# Patient Record
Sex: FEMALE | Race: WHITE | NOT HISPANIC OR LATINO | Employment: UNEMPLOYED | URBAN - METROPOLITAN AREA
[De-identification: names, ages, dates, MRNs, and addresses within clinical notes are randomized per-mention and may not be internally consistent; named-entity substitution may affect disease eponyms.]

---

## 2019-06-12 ENCOUNTER — OFFICE VISIT (OUTPATIENT)
Dept: ORTHOPEDIC SURGERY | Facility: CLINIC | Age: 49
End: 2019-06-12

## 2019-06-12 VITALS — HEIGHT: 65 IN | BODY MASS INDEX: 23.76 KG/M2 | WEIGHT: 142.6 LBS | TEMPERATURE: 99 F

## 2019-06-12 DIAGNOSIS — M70.61 TROCHANTERIC BURSITIS OF RIGHT HIP: ICD-10-CM

## 2019-06-12 DIAGNOSIS — M25.551 ACUTE PAIN OF RIGHT HIP: Primary | ICD-10-CM

## 2019-06-12 PROCEDURE — 99243 OFF/OP CNSLTJ NEW/EST LOW 30: CPT | Performed by: ORTHOPAEDIC SURGERY

## 2019-06-12 PROCEDURE — 73502 X-RAY EXAM HIP UNI 2-3 VIEWS: CPT | Performed by: ORTHOPAEDIC SURGERY

## 2019-06-12 PROCEDURE — 20610 DRAIN/INJ JOINT/BURSA W/O US: CPT | Performed by: ORTHOPAEDIC SURGERY

## 2019-06-12 RX ORDER — PANTOPRAZOLE SODIUM 40 MG/1
40 TABLET, DELAYED RELEASE ORAL DAILY
COMMUNITY
Start: 2018-11-20 | End: 2020-01-22

## 2019-06-12 RX ORDER — TIZANIDINE 4 MG/1
4 TABLET ORAL NIGHTLY PRN
COMMUNITY
End: 2022-11-26

## 2019-06-12 RX ORDER — ASPIRIN 81 MG/1
1 TABLET ORAL DAILY
COMMUNITY

## 2019-06-12 RX ORDER — ATORVASTATIN CALCIUM 80 MG/1
80 TABLET, FILM COATED ORAL DAILY
COMMUNITY
End: 2022-11-26

## 2019-06-12 RX ORDER — AMLODIPINE BESYLATE 5 MG/1
5 TABLET ORAL DAILY
COMMUNITY
End: 2022-11-26

## 2019-06-12 RX ORDER — CARVEDILOL 3.12 MG/1
3.12 TABLET ORAL
COMMUNITY
End: 2022-11-26

## 2019-06-12 RX ORDER — METHYLPREDNISOLONE ACETATE 80 MG/ML
80 INJECTION, SUSPENSION INTRA-ARTICULAR; INTRALESIONAL; INTRAMUSCULAR; SOFT TISSUE
Status: COMPLETED | OUTPATIENT
Start: 2019-06-12 | End: 2019-06-12

## 2019-06-12 RX ADMIN — METHYLPREDNISOLONE ACETATE 80 MG: 80 INJECTION, SUSPENSION INTRA-ARTICULAR; INTRALESIONAL; INTRAMUSCULAR; SOFT TISSUE at 15:37

## 2019-06-12 NOTE — PROGRESS NOTES
Patient Name: Zoya Crook   YOB: 1970  Referring Primary Care Physician: Jojo Coulter APRN  BMI: Body mass index is 23.73 kg/m².    Chief Complaint:    Chief Complaint   Patient presents with   • Right Hip - Establish Care, Pain        HPI:     Zoya Crook is a 48 y.o. female who presents today for evaluation of   Chief Complaint   Patient presents with   • Right Hip - Establish Care, Pain   .  Patient is 48-year-old lady who came over from [facility] when she was 17.  She has had some right hip pain going on for better than a year.  No trauma.  It hurts just superior to the right greater trochanter.  Anti-inflammatories helped a little.  She is currently not working.  She says she tried physical therapy in the past but it did not help and her problem is intermittent.    This problem is new to this examiner.     Subjective   Medications:   Home Medications:  Current Outpatient Medications on File Prior to Visit   Medication Sig   • amLODIPine (NORVASC) 5 MG tablet Take 5 mg by mouth Daily.   • ASPIRIN 81 PO Take 1 tablet by mouth Daily.   • atorvastatin (LIPITOR) 80 MG tablet Take 80 mg by mouth Daily.   • carvedilol (COREG) 3.125 MG tablet Take 3.125 mg by mouth.   • gabapentin (NEURONTIN) 100 MG capsule Take 100 mg by mouth 3 (three) times a day.   • pantoprazole (PROTONIX) 40 MG EC tablet Take 40 mg by mouth Daily.   • sertraline (ZOLOFT) 50 MG tablet Take 50 mg by mouth daily.   • ticagrelor (BRILINTA) 90 MG tablet tablet Take 90 mg by mouth.   • tiZANidine (ZANAFLEX) 4 MG tablet Take 4 mg by mouth At Night As Needed for Muscle Spasms.     No current facility-administered medications on file prior to visit.      Current Medications:  Scheduled Meds:  Continuous Infusions:  No current facility-administered medications for this visit.   PRN Meds:.    I have reviewed the patient's medical history in detail and updated the computerized patient record.  Review and summarization of old records  "includes:    Past Medical History:   Diagnosis Date   • Depression       No past surgical history on file.     Social History     Occupational History   • Not on file   Tobacco Use   • Smoking status: Heavy Tobacco Smoker     Packs/day: 1.00   Substance and Sexual Activity   • Alcohol use: No   • Drug use: No   • Sexual activity: Not on file    Social History     Social History Narrative   • Not on file      History reviewed. No pertinent family history.    ROS: 14 point review of systems was performed and all other systems were reviewed and are negative except for documented findings in HPI and today's encounter.     Allergies:   Allergies   Allergen Reactions   • Naproxen Other (See Comments)     GI UPSET/ HX ULCER     Constitutional:  Denies fever, shaking or chills   Eyes:  Denies change in visual acuity   HENT:  Denies nasal congestion or sore throat   Respiratory:  Denies cough or shortness of breath   Cardiovascular:  Denies chest pain or severe LE edema   GI:  Denies abdominal pain, nausea, vomiting, bloody stools or diarrhea   Musculoskeletal:  Numbness, tingling, pain, or loss of motor function only as noted above in history of present illness.  : Denies painful urination or hematuria  Integument:  Denies rash, lesion or ulceration   Neurologic:  Denies headache or focal weakness  Endocrine:  Denies lymphadenopathy  Psych:  Denies confusion or change in mental status   Hem:  Denies active bleeding    OBJECTIVE:  Physical Exam:   Temp 99 °F (37.2 °C) (Temporal)   Ht 165.1 cm (65\")   Wt 64.7 kg (142 lb 9.6 oz)   BMI 23.73 kg/m²     General Appearance:    Alert, cooperative, in no acute distress                  Eyes: conjunctiva clear  ENT: external ears and nose atraumatic  CV: no peripheral edema  Resp: normal respiratory effort  Skin: no rashes or wounds; normal turgor  Psych: mood and affect appropriate  Lymph: no nodes appreciated  Neuro: gross sensation intact  Vascular:  Palpable peripheral pulse " in noted extremity  Musculoskeletal Extremities: Exam shows tenderness at the tip of the greater trochanter Stinchfield is negative she has good rotation and no pain with axial loading bilaterally she does not really limp abduction causes some tenderness with combined palpation it does not radiate except for locally    Radiology:   AP of the hips lateral right hip taken the office today without comparison views available show moderate arthritic change    Assessment:     ICD-10-CM ICD-9-CM   1. Acute pain of right hip M25.551 719.45   2. Trochanteric bursitis of right hip M70.61 726.5        Large Joint Arthrocentesis: R greater trochanteric bursa  Date/Time: 6/12/2019 3:37 PM  Consent given by: patient  Site marked: site marked  Timeout: Immediately prior to procedure a time out was called to verify the correct patient, procedure, equipment, support staff and site/side marked as required   Supporting Documentation  Indications: pain and joint swelling   Procedure Details  Location: hip - R greater trochanteric bursa  Preparation: Patient was prepped and draped in the usual sterile fashion  Needle size: 22 G (21)  Approach: anterolateral  Medications administered: 4 mL lidocaine (cardiac); 80 mg methylPREDNISolone acetate 80 MG/ML  Patient tolerance: patient tolerated the procedure well with no immediate complications             Plan: Biomechanics of pertinent body area discussed.  Risks, benefits, alternatives, comparisons, and complications of accepted medicines, injections, recommendations, surgical procedures, and therapies explained and education provided in laymen's terms. Natural history and expected course of this patient's diagnosis discussed along with evaluation of therapies. Questions answered. When appropriate I also discussed proper use of cane, walker, trekking poles.   Cortisone Injection for DIAGNOSTIC and THERAPUTIC purposes.injected her with almost immediate numbness and will see if this helps  also gave her some stretches to do.  If she fails to get significantly better insight about a month because of her pain pattern I want her check an MRI and told her 3 time she could call to schedule that.  If it helps and wears off she desires another injection that can be repeated about 3 months.  Thank      6/12/2019    Much of this encounter note is an electronic transcription/translation of spoken language to printed text. The electronic translation of spoken language may permit erroneous, or at times, nonsensical words or phrases to be inadvertently transcribed; Although I have reviewed the note for such errors, some may still exist

## 2020-01-22 ENCOUNTER — OFFICE VISIT (OUTPATIENT)
Dept: GASTROENTEROLOGY | Facility: CLINIC | Age: 50
End: 2020-01-22

## 2020-01-22 VITALS
WEIGHT: 146.6 LBS | BODY MASS INDEX: 24.43 KG/M2 | HEIGHT: 65 IN | TEMPERATURE: 98.1 F | SYSTOLIC BLOOD PRESSURE: 120 MMHG | DIASTOLIC BLOOD PRESSURE: 70 MMHG

## 2020-01-22 DIAGNOSIS — R10.13 EPIGASTRIC PAIN: Primary | ICD-10-CM

## 2020-01-22 DIAGNOSIS — Z12.11 SCREENING FOR COLON CANCER: ICD-10-CM

## 2020-01-22 DIAGNOSIS — R13.19 OTHER DYSPHAGIA: ICD-10-CM

## 2020-01-22 DIAGNOSIS — Z11.59 SCREENING FOR VIRAL DISEASE: ICD-10-CM

## 2020-01-22 PROCEDURE — 99204 OFFICE O/P NEW MOD 45 MIN: CPT | Performed by: NURSE PRACTITIONER

## 2020-01-22 RX ORDER — CYCLOBENZAPRINE HCL 10 MG
TABLET ORAL AS NEEDED
COMMUNITY
Start: 2020-01-20 | End: 2022-11-26

## 2020-01-22 RX ORDER — FAMOTIDINE 40 MG/1
40 TABLET, FILM COATED ORAL 2 TIMES DAILY
Qty: 90 TABLET | Refills: 3 | Status: SHIPPED | OUTPATIENT
Start: 2020-01-22 | End: 2022-11-26

## 2020-01-22 NOTE — PROGRESS NOTES
Chief Complaint   Patient presents with   • Difficulty Swallowing   • Nausea     HPI    Zoya Crook is a  49 y.o. female here to establish care as a new patient with myself and Dr. Cruz.  This patient seen today for complaints of dysphagia and nausea.  She has a history of depression and MI.  Cardiac angioplasty with stent placement in 2018.     Reviewed CT abdomen and pelvis with IV contrast performed at [facility] for abdominal pain in March 2019 with no acute findings.    Patient is seen today accompanied by her daughter with a primary complaint of dysphagia and left upper quadrant abdominal pain.  There is mild associated nausea but no vomiting.  Her appetite is good.  Her weight is stable.  She has had these symptoms for approximately 5 or more years.  Worse over the last several months.  Patient has never seen a gastroenterologist for her issues.  She smokes 1 pack a day.  She denies alcohol use or NSAID use.  Dysphagia occurs about once a week.  Patient will feel food lodged mid chest and drink water for relief.  Denies regurgitation.  She reports being on PPI therapy as recently as 4 months ago but had to stop taking this medication due to the fact that her insurance would not pay for PPI therapy.    She still has her gallbladder.  She has a history of kidney stones.    She follows with cardiology and is currently on Plavix.    No diarrhea, constipation, rectal bleeding.  She has never had a screening colonoscopy.  She denies family history of colon cancer, colon polyps, ulcerative colitis, or Crohn's disease.    She goes on to report that her recent lab work may have shown anemia.  These labs are drawn in December.  I do not have a copy of this lab work.    Her daughter is concerned that she could have hepatitis C virus.  Her daughter has active hepatitis C and is trying to get treated with specialist at [facility].  The patient would like to be screened for hepatitis.    Past Medical History:    Diagnosis Date   • Depression    • Heart attack (CMS/HCC)        Past Surgical History:   Procedure Laterality Date   • CORONARY STENT PLACEMENT         Scheduled Meds:  Outpatient Encounter Medications as of 1/22/2020   Medication Sig Dispense Refill   • amLODIPine (NORVASC) 5 MG tablet Take 5 mg by mouth Daily.     • ASPIRIN 81 PO Take 1 tablet by mouth Daily.     • atorvastatin (LIPITOR) 80 MG tablet Take 80 mg by mouth Daily.     • carvedilol (COREG) 3.125 MG tablet Take 3.125 mg by mouth.     • cyclobenzaprine (FLEXERIL) 10 MG tablet As Needed.     • sertraline (ZOLOFT) 50 MG tablet Take 50 mg by mouth daily.     • [DISCONTINUED] pantoprazole (PROTONIX) 40 MG EC tablet Take 40 mg by mouth Daily.     • famotidine (PEPCID) 40 MG tablet Take 1 tablet by mouth 2 (Two) Times a Day. 90 tablet 3   • gabapentin (NEURONTIN) 100 MG capsule Take 100 mg by mouth 3 (three) times a day.     • ticagrelor (BRILINTA) 90 MG tablet tablet Take 90 mg by mouth.     • tiZANidine (ZANAFLEX) 4 MG tablet Take 4 mg by mouth At Night As Needed for Muscle Spasms.       No facility-administered encounter medications on file as of 1/22/2020.        Continuous Infusions:  No current facility-administered medications for this visit.     PRN Meds:.    Allergies   Allergen Reactions   • Naproxen Other (See Comments)     GI UPSET/ HX ULCER       Social History     Socioeconomic History   • Marital status:      Spouse name: Not on file   • Number of children: Not on file   • Years of education: Not on file   • Highest education level: Not on file   Tobacco Use   • Smoking status: Current Every Day Smoker     Packs/day: 1.00   • Smokeless tobacco: Never Used   Substance and Sexual Activity   • Alcohol use: No   • Drug use: No       History reviewed. No pertinent family history.    Review of Systems   Constitutional: Negative for activity change, appetite change, fatigue, fever and unexpected weight change.   HENT: Positive for trouble  swallowing. Negative for sore throat and voice change.    Eyes: Negative for discharge and itching.   Respiratory: Negative for apnea, cough, choking, chest tightness, shortness of breath and wheezing.    Cardiovascular: Negative for chest pain, palpitations and leg swelling.   Gastrointestinal: Positive for abdominal pain and nausea. Negative for abdominal distention, anal bleeding, blood in stool, constipation, diarrhea, rectal pain and vomiting.   Endocrine: Negative for cold intolerance and heat intolerance.   Genitourinary: Negative for difficulty urinating, dyspareunia and dysuria.   Musculoskeletal: Negative for arthralgias and neck pain.   Skin: Negative for color change and pallor.   Allergic/Immunologic: Negative for environmental allergies and food allergies.   Neurological: Negative for dizziness and headaches.   Hematological: Negative for adenopathy.   Psychiatric/Behavioral: Negative for agitation and confusion.       Vitals:    01/22/20 1435   BP: 120/70   Temp: 98.1 °F (36.7 °C)       Physical Exam   Constitutional: She is oriented to person, place, and time. She appears well-developed and well-nourished.   HENT:   Head: Normocephalic.   Eyes: Pupils are equal, round, and reactive to light.   Neck: Normal range of motion.   Cardiovascular: Normal rate, regular rhythm and normal heart sounds.   Pulmonary/Chest: Effort normal and breath sounds normal. No respiratory distress. She has no wheezes.   Abdominal: Soft. Bowel sounds are normal. She exhibits no distension and no mass. There is tenderness. There is no guarding. No hernia.   Musculoskeletal: Normal range of motion.   Neurological: She is alert and oriented to person, place, and time.   Skin: Skin is warm and dry. Capillary refill takes less than 2 seconds.   Psychiatric: She has a normal mood and affect. Her behavior is normal.     No radiology results for the last 7 days    Zoya was seen today for difficulty swallowing and nausea.    Diagnoses  and all orders for this visit:    Epigastric pain  -     CBC & Differential  -     Comprehensive Metabolic Panel  -     Iron and TIBC  -     Vitamin B12 and Folate  -     Celiac Comprehensive Panel  -     Lipase  -     Case Request; Standing  -     Case Request    Other dysphagia  -     CBC & Differential  -     Comprehensive Metabolic Panel  -     Iron and TIBC  -     Vitamin B12 and Folate  -     Celiac Comprehensive Panel  -     Case Request; Standing  -     Case Request    Screening for colon cancer  -     Case Request; Standing  -     Case Request    Screening for viral disease  -     Hepatitis Panel, Acute    Other orders  -     famotidine (PEPCID) 40 MG tablet; Take 1 tablet by mouth 2 (Two) Times a Day.    Assessment/plan    Pleasant 49-year-old female accompanied by her daughter seen today to establish care with myself and Dr. Cruz with a primary complaint of epigastric to left upper quadrant abdominal pain, nausea, and dysphagia.  Symptoms are longstanding as mentioned above.  She had improvement in symptoms with PPI therapy but insurance has denied coverage.    Moving forward recommend EGD with Dr. Cruz.  The likelihood of her having H. pylori is quite high.  We can also rule out esophagitis, stricture/ring/web, gastritis, celiac disease, peptic ulcer disease, Michelle's esophagus, or mass/malignancy.  In the meanwhile maximize H2 blocker as above.  I instructed the patient to avoid NSAIDs.  I also counseled her on smoking sensation.    Based on her age she is due for colon cancer screening as such we will arrange colonoscopy to be performed in combination with upper endoscopy with Dr. Cruz.    I discussed with the patient that I would prefer that anticoagulation be held for prior to the procedure, as there is otherwise increased risk of bleeding. However, I have indicated that I would defer the final decision to the patient’s prescribing provider (cardiology/internist) as to whether or not it is  safe to hold anticoagulation from a cardiac standpoint.    Labs today with CBC, CMP, check iron stores rule out vitamin deficiencies.  Check celiac panel.  Patient is also concerned that she could have hepatitis C virus.  Obtain hepatitis panel today.  If found to be positive treat accordingly.    Follow-up and further recommendations pending the aforementioned work-up.

## 2020-01-23 DIAGNOSIS — R10.13 EPIGASTRIC PAIN: Primary | ICD-10-CM

## 2020-01-23 DIAGNOSIS — R74.8 ELEVATED ALKALINE PHOSPHATASE LEVEL: ICD-10-CM

## 2020-01-23 LAB
ALBUMIN SERPL-MCNC: 4.3 G/DL (ref 3.5–5.2)
ALBUMIN/GLOB SERPL: 1.7 G/DL
ALP SERPL-CCNC: 177 U/L (ref 39–117)
ALT SERPL-CCNC: 34 U/L (ref 1–33)
AST SERPL-CCNC: 28 U/L (ref 1–32)
BASOPHILS # BLD AUTO: ABNORMAL 10*3/UL
BILIRUB SERPL-MCNC: 0.2 MG/DL (ref 0.2–1.2)
BUN SERPL-MCNC: 12 MG/DL (ref 6–20)
BUN/CREAT SERPL: 21.8 (ref 7–25)
CALCIUM SERPL-MCNC: 9.5 MG/DL (ref 8.6–10.5)
CHLORIDE SERPL-SCNC: 100 MMOL/L (ref 98–107)
CO2 SERPL-SCNC: 25.6 MMOL/L (ref 22–29)
CREAT SERPL-MCNC: 0.55 MG/DL (ref 0.57–1)
DIFFERENTIAL COMMENT: ABNORMAL
ENDOMYSIUM IGA SER QL: NEGATIVE
EOSINOPHIL # BLD AUTO: ABNORMAL 10*3/UL
EOSINOPHIL # BLD MANUAL: 0.29 10*3/MM3 (ref 0–0.4)
EOSINOPHIL NFR BLD AUTO: ABNORMAL %
EOSINOPHIL NFR BLD MANUAL: 3 % (ref 0.3–6.2)
ERYTHROCYTE [DISTWIDTH] IN BLOOD BY AUTOMATED COUNT: 12.6 % (ref 12.3–15.4)
FOLATE SERPL-MCNC: 15.9 NG/ML (ref 4.78–24.2)
GLIADIN PEPTIDE IGA SER-ACNC: 9 UNITS (ref 0–19)
GLIADIN PEPTIDE IGG SER-ACNC: 2 UNITS (ref 0–19)
GLOBULIN SER CALC-MCNC: 2.5 GM/DL
GLUCOSE SERPL-MCNC: 87 MG/DL (ref 65–99)
HAV IGM SERPL QL IA: NEGATIVE
HBV CORE IGM SERPL QL IA: NEGATIVE
HBV SURFACE AG SERPL QL IA: NEGATIVE
HCT VFR BLD AUTO: 45.3 % (ref 34–46.6)
HCV AB S/CO SERPL IA: <0.1 S/CO RATIO (ref 0–0.9)
HGB BLD-MCNC: 15 G/DL (ref 12–15.9)
IGA SERPL-MCNC: 288 MG/DL (ref 87–352)
IRON SATN MFR SERPL: 14 % (ref 20–50)
IRON SERPL-MCNC: 46 MCG/DL (ref 37–145)
LIPASE SERPL-CCNC: 49 U/L (ref 13–60)
LYMPHOCYTES # BLD AUTO: ABNORMAL 10*3/UL
LYMPHOCYTES # BLD MANUAL: 3.3 10*3/MM3 (ref 0.7–3.1)
LYMPHOCYTES NFR BLD AUTO: ABNORMAL %
LYMPHOCYTES NFR BLD MANUAL: 34 % (ref 19.6–45.3)
MCH RBC QN AUTO: 27.8 PG (ref 26.6–33)
MCHC RBC AUTO-ENTMCNC: 33.1 G/DL (ref 31.5–35.7)
MCV RBC AUTO: 83.9 FL (ref 79–97)
MONOCYTES # BLD MANUAL: 0.39 10*3/MM3 (ref 0.1–0.9)
MONOCYTES NFR BLD AUTO: ABNORMAL %
MONOCYTES NFR BLD MANUAL: 4 % (ref 5–12)
NEUTROPHILS # BLD MANUAL: 5.73 10*3/MM3 (ref 1.7–7)
NEUTROPHILS NFR BLD AUTO: ABNORMAL %
NEUTROPHILS NFR BLD MANUAL: 59 % (ref 42.7–76)
PLATELET # BLD AUTO: 124 10*3/MM3 (ref 140–450)
PLATELET BLD QL SMEAR: ABNORMAL
POTASSIUM SERPL-SCNC: 4.3 MMOL/L (ref 3.5–5.2)
PROT SERPL-MCNC: 6.8 G/DL (ref 6–8.5)
RBC # BLD AUTO: 5.4 10*6/MM3 (ref 3.77–5.28)
RBC MORPH BLD: ABNORMAL
SODIUM SERPL-SCNC: 140 MMOL/L (ref 136–145)
TIBC SERPL-MCNC: 337 MCG/DL
TTG IGA SER-ACNC: <2 U/ML (ref 0–3)
TTG IGG SER-ACNC: <2 U/ML (ref 0–5)
UIBC SERPL-MCNC: 291 MCG/DL (ref 112–346)
VIT B12 SERPL-MCNC: 988 PG/ML (ref 211–946)
WBC # BLD AUTO: 9.72 10*3/MM3 (ref 3.4–10.8)

## 2020-01-23 NOTE — PROGRESS NOTES
Please notify the patient that lab work shows no evidence of anemia. She does have slight elevation in her liver function.  Recommend she get a liver ultrasound make sure she does not have any gallstones.  Hepatitis panel is negative.

## 2020-01-29 ENCOUNTER — APPOINTMENT (OUTPATIENT)
Dept: ULTRASOUND IMAGING | Facility: HOSPITAL | Age: 50
End: 2020-01-29

## 2020-01-29 ENCOUNTER — HOSPITAL ENCOUNTER (OUTPATIENT)
Dept: ULTRASOUND IMAGING | Facility: HOSPITAL | Age: 50
Discharge: HOME | End: 2020-01-29
Admitting: NURSE PRACTITIONER

## 2020-01-29 ENCOUNTER — TELEPHONE (OUTPATIENT)
Dept: GASTROENTEROLOGY | Facility: CLINIC | Age: 50
End: 2020-01-29

## 2020-01-29 DIAGNOSIS — R10.13 EPIGASTRIC PAIN: ICD-10-CM

## 2020-01-29 DIAGNOSIS — R74.8 ELEVATED ALKALINE PHOSPHATASE LEVEL: ICD-10-CM

## 2020-01-29 PROCEDURE — 76705 ECHO EXAM OF ABDOMEN: CPT

## 2020-01-29 NOTE — TELEPHONE ENCOUNTER
Called Dr Hughes's office and advised calling for clearance for pt to hold Plavix.  advised they prefer that request be faxed [phone] with attention to Jessica.     Request faxed.

## 2020-01-29 NOTE — TELEPHONE ENCOUNTER
----- Message from ZAYNAB Cramer sent at 1/22/2020  3:12 PM EST -----  Patient will need cardiac clearance to hold Plavix for EGD and colonoscopy with Dr. Cruz.      Patients cardiologist Dr. Lerma (?) [phone]

## 2020-01-31 NOTE — PROGRESS NOTES
Please notify patient that ultrasound shows nonobstructing kidney stones.  No evidence of gallstones.  No gallbladder inflammation.  She does have a cyst on the right upper kidney.  She can follow-up with her primary care provider regarding renal findings.

## 2020-02-14 ENCOUNTER — TELEPHONE (OUTPATIENT)
Dept: GASTROENTEROLOGY | Facility: CLINIC | Age: 50
End: 2020-02-14

## 2020-02-14 NOTE — TELEPHONE ENCOUNTER
----- Message from ZAYNAB Cramer sent at 1/31/2020  1:02 PM EST -----  Please notify patient that ultrasound shows nonobstructing kidney stones.  No evidence of gallstones.  No gallbladder inflammation.  She does have a cyst on the right upper kidney.  She can follow-up with her primary care provider regarding renal fin  dings.

## 2020-02-14 NOTE — TELEPHONE ENCOUNTER
Called and spoke with pt and pt's daughter on speaker phone. Advised of the notes from Naomi. They verb understanding and asked if the plan is still to move forward with an EGD and C/S? Advised yes, it looks like we have reached out to pt's cardiologist for clearance to hold Plavix, but have not heard back yet. Verified pt sees Dr Hughes. Pt's daughter states pt has an appt with Dr Mercado next week on the 19th so she will mention needing clearance at the appt.

## 2020-02-14 NOTE — TELEPHONE ENCOUNTER
----- Message from ZAYNAB Cramer sent at 1/23/2020 10:03 AM EST -----  Please notify the patient that lab work shows no evidence of anemia. She does have slight elevation in her liver function.  Recommend she get a liver ultrasound make sure she does not have any gallstones.  Hepatitis panel is negative.

## 2020-02-19 ENCOUNTER — TELEPHONE (OUTPATIENT)
Dept: GASTROENTEROLOGY | Facility: CLINIC | Age: 50
End: 2020-02-19

## 2020-02-19 NOTE — TELEPHONE ENCOUNTER
----- Message from ZAYNAB Cramer sent at 2/19/2020 12:49 PM EST -----  Regarding: Cardiac clearance  Attached is the patient's cardiac clearance for upcoming procedures with Dr. Cruz.  ----- Message -----  From: Nilda Tim  Sent: 2/17/2020  12:50 PM EST  To: ZAYNAB Cramer

## 2020-02-21 NOTE — TELEPHONE ENCOUNTER
Per Dr. Cruz: Patient has been cleared for the scopic procedures please schedule, and let the patient know what date

## 2020-03-24 PROBLEM — R10.13 EPIGASTRIC PAIN: Status: ACTIVE | Noted: 2020-03-24

## 2020-03-24 PROBLEM — Z12.11 SCREENING FOR COLON CANCER: Status: ACTIVE | Noted: 2020-03-24

## 2020-03-24 PROBLEM — R13.19 OTHER DYSPHAGIA: Status: ACTIVE | Noted: 2020-03-24

## 2020-05-27 ENCOUNTER — TRANSCRIBE ORDERS (OUTPATIENT)
Dept: SLEEP MEDICINE | Facility: HOSPITAL | Age: 50
End: 2020-05-27

## 2020-05-27 DIAGNOSIS — Z01.818 OTHER SPECIFIED PRE-OPERATIVE EXAMINATION: Primary | ICD-10-CM

## 2020-05-29 ENCOUNTER — TRANSCRIBE ORDERS (OUTPATIENT)
Dept: SLEEP MEDICINE | Facility: HOSPITAL | Age: 50
End: 2020-05-29

## 2020-05-29 DIAGNOSIS — Z01.818 OTHER SPECIFIED PRE-OPERATIVE EXAMINATION: Primary | ICD-10-CM

## 2020-07-16 ENCOUNTER — TRANSCRIBE ORDERS (OUTPATIENT)
Dept: SLEEP MEDICINE | Facility: HOSPITAL | Age: 50
End: 2020-07-16

## 2020-07-16 DIAGNOSIS — Z01.818 OTHER SPECIFIED PRE-OPERATIVE EXAMINATION: Primary | ICD-10-CM

## 2020-07-18 ENCOUNTER — LAB (OUTPATIENT)
Dept: LAB | Facility: HOSPITAL | Age: 50
End: 2020-07-18

## 2020-07-18 DIAGNOSIS — Z01.818 OTHER SPECIFIED PRE-OPERATIVE EXAMINATION: ICD-10-CM

## 2020-07-18 PROCEDURE — C9803 HOPD COVID-19 SPEC COLLECT: HCPCS

## 2020-07-18 PROCEDURE — U0004 COV-19 TEST NON-CDC HGH THRU: HCPCS

## 2020-07-20 LAB
REF LAB TEST METHOD: NORMAL
SARS-COV-2 RNA RESP QL NAA+PROBE: NOT DETECTED

## 2022-03-25 ENCOUNTER — HOSPITAL ENCOUNTER (EMERGENCY)
Facility: HOSPITAL | Age: 52
Discharge: HOME | End: 2022-03-25
Attending: EMERGENCY MEDICINE | Admitting: EMERGENCY MEDICINE

## 2022-03-25 VITALS
RESPIRATION RATE: 18 BRPM | HEART RATE: 99 BPM | TEMPERATURE: 98.2 F | BODY MASS INDEX: 24.54 KG/M2 | WEIGHT: 147.27 LBS | DIASTOLIC BLOOD PRESSURE: 78 MMHG | OXYGEN SATURATION: 100 % | HEIGHT: 65 IN | SYSTOLIC BLOOD PRESSURE: 152 MMHG

## 2022-03-25 DIAGNOSIS — J06.9 VIRAL URI WITH COUGH: Primary | ICD-10-CM

## 2022-03-25 PROCEDURE — 99282 EMERGENCY DEPT VISIT SF MDM: CPT

## 2022-03-25 RX ORDER — BROMPHENIRAMINE MALEATE, PSEUDOEPHEDRINE HYDROCHLORIDE, AND DEXTROMETHORPHAN HYDROBROMIDE 2; 30; 10 MG/5ML; MG/5ML; MG/5ML
10 SYRUP ORAL 4 TIMES DAILY PRN
Qty: 240 ML | Refills: 0 | Status: SHIPPED | OUTPATIENT
Start: 2022-03-25 | End: 2022-11-26

## 2022-03-25 RX ORDER — FLUTICASONE PROPIONATE 50 MCG
2 SPRAY, SUSPENSION (ML) NASAL DAILY
Qty: 16 G | Refills: 0 | Status: SHIPPED | OUTPATIENT
Start: 2022-03-25 | End: 2022-11-26

## 2022-03-25 RX ORDER — CETIRIZINE HYDROCHLORIDE 5 MG/1
5 TABLET, CHEWABLE ORAL DAILY
Qty: 30 TABLET | Refills: 0 | Status: SHIPPED | OUTPATIENT
Start: 2022-03-25 | End: 2022-11-26

## 2022-03-25 NOTE — ED PROVIDER NOTES
Subjective   Pt to ED with complaint of cough, runny nose and sore throat for about 3 days. No fever, chills, N/V/D. Feels congested, has taken tylenol with no relief.      History provided by:  Parent   used: No        Review of Systems   Constitutional: Negative.    HENT: Positive for congestion, postnasal drip and rhinorrhea.    Eyes: Negative.    Respiratory: Positive for cough.    Cardiovascular: Negative.    Gastrointestinal: Negative.    Endocrine: Negative.    Genitourinary: Negative.    Musculoskeletal: Negative.    Skin: Negative.    Allergic/Immunologic: Negative.    Neurological: Negative.    Hematological: Negative.    Psychiatric/Behavioral: Negative.        Past Medical History:   Diagnosis Date   • Depression    • Heart attack (HCC)    • Hyperlipidemia    • Hypertension    • Kidney stone        Allergies   Allergen Reactions   • Acetaminophen Unknown - High Severity   • Naproxen Other (See Comments)     GI UPSET/ HX ULCER   • Ciprofloxacin Rash       Past Surgical History:   Procedure Laterality Date   • CORONARY STENT PLACEMENT         History reviewed. No pertinent family history.    Social History     Socioeconomic History   • Marital status:    Tobacco Use   • Smoking status: Current Every Day Smoker     Packs/day: 1.00   • Smokeless tobacco: Never Used   Substance and Sexual Activity   • Alcohol use: No   • Drug use: No           Objective   Physical Exam  Vitals reviewed.   Constitutional:       Appearance: Normal appearance. She is normal weight.   HENT:      Head: Normocephalic and atraumatic.      Right Ear: Tympanic membrane normal.      Left Ear: Tympanic membrane normal.      Nose: Nose normal.      Mouth/Throat:      Mouth: Mucous membranes are moist.      Pharynx: Posterior oropharyngeal erythema present.   Eyes:      Pupils: Pupils are equal, round, and reactive to light.   Cardiovascular:      Rate and Rhythm: Normal rate and regular rhythm.      Pulses:  Normal pulses.   Pulmonary:      Effort: Pulmonary effort is normal.      Breath sounds: Normal breath sounds and air entry.   Abdominal:      General: Abdomen is flat. Bowel sounds are normal.      Palpations: Abdomen is soft.   Musculoskeletal:         General: Normal range of motion.      Cervical back: Normal range of motion.   Skin:     General: Skin is warm and dry.      Capillary Refill: Capillary refill takes less than 2 seconds.   Neurological:      General: No focal deficit present.      Mental Status: She is alert and oriented to person, place, and time. Mental status is at baseline.   Psychiatric:         Mood and Affect: Mood normal.         Procedures           ED Course                                                 MDM  Number of Diagnoses or Management Options  Viral URI with cough: minor  Risk of Complications, Morbidity, and/or Mortality  Presenting problems: low  Management options: low    Patient Progress  Patient progress: stable      Final diagnoses:   Viral URI with cough       ED Disposition  ED Disposition     ED Disposition   Discharge    Condition   Stable    Comment   --             Provider, No Known  [facility] [id number]      if no better         Medication List      New Prescriptions    brompheniramine-pseudoephedrine-DM 30-2-10 MG/5ML syrup  Take 10 mL by mouth 4 (Four) Times a Day As Needed for Congestion or Cough.     cetirizine 5 MG chewable tablet  Commonly known as: ZyrTEC  Chew 1 tablet Daily for 30 days.     fluticasone 50 MCG/ACT nasal spray  Commonly known as: FLONASE  2 sprays into the nostril(s) as directed by provider Daily.           Where to Get Your Medications      [id number] - [place], [place] - [address] AT [facility] - [phone]  - [phone] FX  [address] [place] [address]    Phone: [phone]   · brompheniramine-pseudoephedrine-DM 30-2-10  MG/5ML syrup  · cetirizine 5 MG chewable tablet  · fluticasone 50 MCG/ACT nasal spray          Mariam Tapia, APRN  03/25/22 1510

## 2022-05-19 ENCOUNTER — APPOINTMENT (OUTPATIENT)
Dept: GENERAL RADIOLOGY | Facility: HOSPITAL | Age: 52
End: 2022-05-19

## 2022-05-19 LAB
FLUAV AG NPH QL: NEGATIVE
FLUBV AG NPH QL IA: NEGATIVE
S PYO AG THROAT QL: NEGATIVE

## 2022-05-19 PROCEDURE — 87081 CULTURE SCREEN ONLY: CPT | Performed by: EMERGENCY MEDICINE

## 2022-05-19 PROCEDURE — 87804 INFLUENZA ASSAY W/OPTIC: CPT

## 2022-05-19 PROCEDURE — 87880 STREP A ASSAY W/OPTIC: CPT

## 2022-05-19 PROCEDURE — U0004 COV-19 TEST NON-CDC HGH THRU: HCPCS

## 2022-05-19 PROCEDURE — 71045 X-RAY EXAM CHEST 1 VIEW: CPT

## 2022-05-19 PROCEDURE — 99283 EMERGENCY DEPT VISIT LOW MDM: CPT

## 2022-05-19 PROCEDURE — C9803 HOPD COVID-19 SPEC COLLECT: HCPCS | Performed by: EMERGENCY MEDICINE

## 2022-05-20 ENCOUNTER — HOSPITAL ENCOUNTER (EMERGENCY)
Facility: HOSPITAL | Age: 52
Discharge: HOME | End: 2022-05-20
Attending: EMERGENCY MEDICINE | Admitting: EMERGENCY MEDICINE

## 2022-05-20 VITALS
HEIGHT: 65 IN | OXYGEN SATURATION: 98 % | SYSTOLIC BLOOD PRESSURE: 132 MMHG | RESPIRATION RATE: 17 BRPM | TEMPERATURE: 99 F | BODY MASS INDEX: 23.58 KG/M2 | WEIGHT: 141.54 LBS | HEART RATE: 80 BPM | DIASTOLIC BLOOD PRESSURE: 62 MMHG

## 2022-05-20 DIAGNOSIS — J02.9 PHARYNGITIS, UNSPECIFIED ETIOLOGY: Primary | ICD-10-CM

## 2022-05-20 DIAGNOSIS — R11.2 NAUSEA AND VOMITING, UNSPECIFIED VOMITING TYPE: ICD-10-CM

## 2022-05-20 LAB — SARS-COV-2 RNA PNL SPEC NAA+PROBE: NOT DETECTED

## 2022-05-20 PROCEDURE — 63710000001 ONDANSETRON ODT 4 MG TABLET DISPERSIBLE: Performed by: NURSE PRACTITIONER

## 2022-05-20 PROCEDURE — 63710000001 PREDNISONE PER 5 MG: Performed by: NURSE PRACTITIONER

## 2022-05-20 RX ORDER — PREDNISONE 50 MG/1
50 TABLET ORAL DAILY
Qty: 4 TABLET | Refills: 0 | Status: SHIPPED | OUTPATIENT
Start: 2022-05-20 | End: 2022-11-26

## 2022-05-20 RX ORDER — OMEPRAZOLE 40 MG/1
40 CAPSULE, DELAYED RELEASE ORAL DAILY
COMMUNITY

## 2022-05-20 RX ORDER — ONDANSETRON 4 MG/1
4 TABLET, ORALLY DISINTEGRATING ORAL 4 TIMES DAILY PRN
Qty: 15 TABLET | Refills: 0 | Status: SHIPPED | OUTPATIENT
Start: 2022-05-20 | End: 2022-11-26

## 2022-05-20 RX ORDER — PREDNISONE 50 MG/1
50 TABLET ORAL ONCE
Status: COMPLETED | OUTPATIENT
Start: 2022-05-20 | End: 2022-05-20

## 2022-05-20 RX ORDER — ONDANSETRON 4 MG/1
4 TABLET, ORALLY DISINTEGRATING ORAL ONCE
Status: COMPLETED | OUTPATIENT
Start: 2022-05-20 | End: 2022-05-20

## 2022-05-20 RX ORDER — CLOPIDOGREL BISULFATE 75 MG/1
75 TABLET ORAL DAILY
COMMUNITY

## 2022-05-20 RX ADMIN — PREDNISONE 50 MG: 50 TABLET ORAL at 04:00

## 2022-05-20 RX ADMIN — ONDANSETRON 4 MG: 4 TABLET, ORALLY DISINTEGRATING ORAL at 04:00

## 2022-05-20 NOTE — ED PROVIDER NOTES
Subjective   The patient presents to the emergency department complains of a sore throat that she states she woke up with yesterday.  She states that she has not had any other upper respiratory symptoms such as cough or congestion.  She denies any fevers.  She states she did have 2 episodes of nausea and vomiting yesterday but no diarrhea.  She denies any abdominal pain or tenderness.  She reports no chest pain or shortness of breath.  She states she is able to swallow without difficulties.  On exam her breath sounds are clear.  Her airway is patent.  There is no exudate noted.  Her abdomen is soft and nontender with palpation.          Review of Systems   Constitutional: Negative for chills and fever.   HENT: Positive for sore throat. Negative for congestion, ear pain, facial swelling, trouble swallowing and voice change.    Eyes: Negative for pain.   Respiratory: Negative for cough, chest tightness and shortness of breath.    Cardiovascular: Negative for chest pain.   Gastrointestinal: Positive for nausea and vomiting. Negative for abdominal pain and diarrhea.   Genitourinary: Negative for dysuria, flank pain, hematuria and urgency.   Musculoskeletal: Negative for back pain, joint swelling, neck pain and neck stiffness.   Skin: Negative for pallor and rash.   Neurological: Negative for seizures and headaches.   All other systems reviewed and are negative.      Past Medical History:   Diagnosis Date   • Depression    • Heart attack (HCC)    • Hyperlipidemia    • Hypertension    • Kidney stone        Allergies   Allergen Reactions   • Acetaminophen Unknown - High Severity   • Naproxen Other (See Comments)     GI UPSET/ HX ULCER   • Ciprofloxacin Rash       Past Surgical History:   Procedure Laterality Date   • CORONARY STENT PLACEMENT         History reviewed. No pertinent family history.    Social History     Socioeconomic History   • Marital status:    Tobacco Use   • Smoking status: Current Every Day  Smoker     Packs/day: 1.00   • Smokeless tobacco: Never Used   Substance and Sexual Activity   • Alcohol use: No   • Drug use: No           Objective   Physical Exam  Vitals and nursing note reviewed.   Constitutional:       General: She is not in acute distress.     Appearance: Normal appearance. She is not toxic-appearing.   HENT:      Head: Normocephalic and atraumatic.      Mouth/Throat:      Mouth: Mucous membranes are moist.      Pharynx: Oropharynx is clear. Posterior oropharyngeal erythema present. No oropharyngeal exudate.   Eyes:      General: No scleral icterus.  Cardiovascular:      Rate and Rhythm: Normal rate and regular rhythm.      Pulses: Normal pulses.   Pulmonary:      Effort: Pulmonary effort is normal. No respiratory distress.      Breath sounds: Normal breath sounds.   Abdominal:      General: Abdomen is flat.      Palpations: Abdomen is soft.      Tenderness: There is no abdominal tenderness. There is no guarding or rebound.   Musculoskeletal:         General: Normal range of motion.      Cervical back: Normal range of motion and neck supple. No rigidity or tenderness.   Lymphadenopathy:      Cervical: No cervical adenopathy.   Skin:     General: Skin is warm and dry.      Capillary Refill: Capillary refill takes less than 2 seconds.      Findings: No rash.   Neurological:      General: No focal deficit present.      Mental Status: She is alert and oriented to person, place, and time. Mental status is at baseline.   Psychiatric:         Mood and Affect: Mood normal.         Behavior: Behavior normal.         Procedures           ED Course                                                 MDM  Number of Diagnoses or Management Options  Nausea and vomiting, unspecified vomiting type: minor  Pharyngitis, unspecified etiology: minor     Amount and/or Complexity of Data Reviewed  Clinical lab tests: reviewed  Tests in the radiology section of CPT®: reviewed    Risk of Complications, Morbidity, and/or  Mortality  Presenting problems: minimal  Diagnostic procedures: minimal  Management options: minimal    Patient Progress  Patient progress: stable      Final diagnoses:   Pharyngitis, unspecified etiology   Nausea and vomiting, unspecified vomiting type       ED Disposition  ED Disposition     ED Disposition   Discharge    Condition   Stable    Comment   --             Magaly Stanley, APRN  [address] [facility] [address] [phone]    Call today  FOR FOLLOW UP ON MONDAY OR TUESDAY         Medication List      New Prescriptions    ondansetron ODT 4 MG disintegrating tablet  Commonly known as: ZOFRAN-ODT  Place 1 tablet on the tongue 4 (Four) Times a Day As Needed for Nausea or Vomiting.     predniSONE 50 MG tablet  Commonly known as: DELTASONE  Take 1 tablet by mouth Daily.           Where to Get Your Medications      These medications were sent to [id number] - [place] - [address] AT [facility] - [phone]  - [phone]   [address] [place] [address]    Hours: 24-hours Phone: [phone]   · ondansetron ODT 4 MG disintegrating tablet  · predniSONE 50 MG tablet          Margaux Masterson APRN  05/20/22 0863

## 2022-05-22 LAB — BACTERIA SPEC AEROBE CULT: NORMAL

## 2022-09-06 ENCOUNTER — HOSPITAL ENCOUNTER (EMERGENCY)
Facility: HOSPITAL | Age: 52
Discharge: HOME | End: 2022-09-06
Attending: EMERGENCY MEDICINE | Admitting: EMERGENCY MEDICINE

## 2022-09-06 ENCOUNTER — APPOINTMENT (OUTPATIENT)
Dept: GENERAL RADIOLOGY | Facility: HOSPITAL | Age: 52
End: 2022-09-06

## 2022-09-06 VITALS
BODY MASS INDEX: 22.88 KG/M2 | HEIGHT: 65 IN | WEIGHT: 137.35 LBS | OXYGEN SATURATION: 99 % | SYSTOLIC BLOOD PRESSURE: 146 MMHG | DIASTOLIC BLOOD PRESSURE: 78 MMHG | RESPIRATION RATE: 17 BRPM | HEART RATE: 78 BPM | TEMPERATURE: 97.8 F

## 2022-09-06 DIAGNOSIS — R07.9 CHEST PAIN IN ADULT: Primary | ICD-10-CM

## 2022-09-06 LAB
ALBUMIN SERPL-MCNC: 4.2 G/DL (ref 3.5–5.2)
ALBUMIN/GLOB SERPL: 1.2 G/DL
ALP SERPL-CCNC: 120 U/L (ref 39–117)
ALT SERPL W P-5'-P-CCNC: 18 U/L (ref 1–33)
ANION GAP SERPL CALCULATED.3IONS-SCNC: 11.6 MMOL/L (ref 5–15)
AST SERPL-CCNC: 19 U/L (ref 1–32)
BASOPHILS # BLD AUTO: 0.08 10*3/MM3 (ref 0–0.2)
BASOPHILS NFR BLD AUTO: 0.8 % (ref 0–1.5)
BILIRUB SERPL-MCNC: 0.3 MG/DL (ref 0–1.2)
BUN SERPL-MCNC: 12 MG/DL (ref 6–20)
BUN/CREAT SERPL: 22.6 (ref 7–25)
CALCIUM SPEC-SCNC: 9.1 MG/DL (ref 8.6–10.5)
CHLORIDE SERPL-SCNC: 103 MMOL/L (ref 98–107)
CO2 SERPL-SCNC: 24.4 MMOL/L (ref 22–29)
CREAT SERPL-MCNC: 0.53 MG/DL (ref 0.57–1)
DEPRECATED RDW RBC AUTO: 42 FL (ref 37–54)
EGFRCR SERPLBLD CKD-EPI 2021: 110.1 ML/MIN/1.73
EOSINOPHIL # BLD AUTO: 0.19 10*3/MM3 (ref 0–0.4)
EOSINOPHIL NFR BLD AUTO: 1.8 % (ref 0.3–6.2)
ERYTHROCYTE [DISTWIDTH] IN BLOOD BY AUTOMATED COUNT: 13.8 % (ref 12.3–15.4)
GLOBULIN UR ELPH-MCNC: 3.5 GM/DL
GLUCOSE SERPL-MCNC: 119 MG/DL (ref 65–99)
HCT VFR BLD AUTO: 41.8 % (ref 34–46.6)
HGB BLD-MCNC: 14 G/DL (ref 12–15.9)
HOLD SPECIMEN: 11
HOLD SPECIMEN: 11
HOLD SPECIMEN: NORMAL
HOLD SPECIMEN: NORMAL
IMM GRANULOCYTES # BLD AUTO: 0.02 10*3/MM3 (ref 0–0.05)
IMM GRANULOCYTES NFR BLD AUTO: 0.2 % (ref 0–0.5)
LIPASE SERPL-CCNC: 36 U/L (ref 13–60)
LYMPHOCYTES # BLD AUTO: 2.59 10*3/MM3 (ref 0.7–3.1)
LYMPHOCYTES NFR BLD AUTO: 24.9 % (ref 19.6–45.3)
MAGNESIUM SERPL-MCNC: 1.8 MG/DL (ref 1.6–2.6)
MCH RBC QN AUTO: 28 PG (ref 26.6–33)
MCHC RBC AUTO-ENTMCNC: 33.5 G/DL (ref 31.5–35.7)
MCV RBC AUTO: 83.6 FL (ref 79–97)
MONOCYTES # BLD AUTO: 0.65 10*3/MM3 (ref 0.1–0.9)
MONOCYTES NFR BLD AUTO: 6.3 % (ref 5–12)
NEUTROPHILS NFR BLD AUTO: 6.87 10*3/MM3 (ref 1.7–7)
NEUTROPHILS NFR BLD AUTO: 66 % (ref 42.7–76)
NRBC BLD AUTO-RTO: 0 /100 WBC (ref 0–0.2)
NT-PROBNP SERPL-MCNC: 107.4 PG/ML (ref 0–900)
PLATELET # BLD AUTO: 141 10*3/MM3 (ref 140–450)
PMV BLD AUTO: 13.2 FL (ref 6–12)
POTASSIUM SERPL-SCNC: 3.6 MMOL/L (ref 3.5–5.2)
PROT SERPL-MCNC: 7.7 G/DL (ref 6–8.5)
QT INTERVAL: 366 MS
QT INTERVAL: 407 MS
RBC # BLD AUTO: 5 10*6/MM3 (ref 3.77–5.28)
SODIUM SERPL-SCNC: 139 MMOL/L (ref 136–145)
TROPONIN I SERPL-MCNC: 0 NG/ML (ref 0–0.08)
TROPONIN I SERPL-MCNC: 0 NG/ML (ref 0–0.08)
WBC NRBC COR # BLD: 10.4 10*3/MM3 (ref 3.4–10.8)
WHOLE BLOOD HOLD COAG: 11
WHOLE BLOOD HOLD SPECIMEN: 11

## 2022-09-06 PROCEDURE — 84484 ASSAY OF TROPONIN QUANT: CPT

## 2022-09-06 PROCEDURE — 93005 ELECTROCARDIOGRAM TRACING: CPT | Performed by: EMERGENCY MEDICINE

## 2022-09-06 PROCEDURE — 85025 COMPLETE CBC W/AUTO DIFF WBC: CPT | Performed by: EMERGENCY MEDICINE

## 2022-09-06 PROCEDURE — 36415 COLL VENOUS BLD VENIPUNCTURE: CPT

## 2022-09-06 PROCEDURE — 83735 ASSAY OF MAGNESIUM: CPT | Performed by: EMERGENCY MEDICINE

## 2022-09-06 PROCEDURE — 93005 ELECTROCARDIOGRAM TRACING: CPT

## 2022-09-06 PROCEDURE — 80053 COMPREHEN METABOLIC PANEL: CPT | Performed by: EMERGENCY MEDICINE

## 2022-09-06 PROCEDURE — 83690 ASSAY OF LIPASE: CPT | Performed by: EMERGENCY MEDICINE

## 2022-09-06 PROCEDURE — 99284 EMERGENCY DEPT VISIT MOD MDM: CPT

## 2022-09-06 PROCEDURE — 71045 X-RAY EXAM CHEST 1 VIEW: CPT

## 2022-09-06 PROCEDURE — 83880 ASSAY OF NATRIURETIC PEPTIDE: CPT | Performed by: EMERGENCY MEDICINE

## 2022-09-06 RX ORDER — SODIUM CHLORIDE 0.9 % (FLUSH) 0.9 %
10 SYRINGE (ML) INJECTION AS NEEDED
Status: DISCONTINUED | OUTPATIENT
Start: 2022-09-06 | End: 2022-09-06 | Stop reason: HOSPADM

## 2022-09-06 RX ORDER — ASPIRIN 81 MG/1
324 TABLET, CHEWABLE ORAL ONCE
Status: COMPLETED | OUTPATIENT
Start: 2022-09-06 | End: 2022-09-06

## 2022-09-06 RX ADMIN — ASPIRIN 81 MG CHEWABLE TABLET 324 MG: 81 TABLET CHEWABLE at 15:05

## 2022-09-06 NOTE — ED PROVIDER NOTES
Time: 1:22 PM EDT  Arrived by: EMS  Chief Complaint: chest pain and anxiety  History provided by: patient  History is limited by: N/A    History of Present Illness:  Patient is a 52 y.o. year old female who presents to the emergency department with chest pain and anxiety.    Patient arrives to ED by EMS. Patient has a medical history of myocardial infarction with stent placement in (2018) and anxiety. Patient is a current, daily smoker.     Patient reports increased symptoms of anxiety and chest pain after law-enforcement came for her son today (09/06/2022). Patient also reported crying during and after the incident. Patient reports on initial evaluation, pain has subsided at about 11:30am. Patient denies fever, chills, SOB, diaphoresis.       History provided by:  Patient   used: No        Patient Care Team  Primary Care Provider: Magaly Stanley APRN    Past Medical History:     Allergies   Allergen Reactions   • Acetaminophen Unknown - High Severity   • Naproxen Other (See Comments)     GI UPSET/ HX ULCER   • Ciprofloxacin Rash     Past Medical History:   Diagnosis Date   • Depression    • Heart attack (HCC)    • Hyperlipidemia    • Hypertension    • Kidney stone      Past Surgical History:   Procedure Laterality Date   • CORONARY STENT PLACEMENT       History reviewed. No pertinent family history.    Home Medications:  Prior to Admission medications    Medication Sig Start Date End Date Taking? Authorizing Provider   amLODIPine (NORVASC) 5 MG tablet Take 5 mg by mouth Daily.    ProviderLuis MD   ASPIRIN 81 PO Take 1 tablet by mouth Daily.    Provider, MD Luis   atorvastatin (LIPITOR) 80 MG tablet Take 80 mg by mouth Daily.    Provider, MD Luis   brompheniramine-pseudoephedrine-DM 30-2-10 MG/5ML syrup Take 10 mL by mouth 4 (Four) Times a Day As Needed for Congestion or Cough. 3/25/22   Mariam Tapia APRN   carvedilol (COREG) 3.125 MG tablet Take 3.125 mg by  mouth.    Luis Mahoney MD   cetirizine (ZyrTEC) 5 MG chewable tablet Chew 1 tablet Daily for 30 days. 3/25/22 4/24/22  Mariam Tapia APRN   clopidogrel (PLAVIX) 75 MG tablet Take 75 mg by mouth Daily.    Luis Mahoney MD   cyclobenzaprine (FLEXERIL) 10 MG tablet As Needed. 1/20/20   Luis Mahoney MD   famotidine (PEPCID) 40 MG tablet Take 1 tablet by mouth 2 (Two) Times a Day. 1/22/20   Naomi Henry APRN   fluticasone (FLONASE) 50 MCG/ACT nasal spray 2 sprays into the nostril(s) as directed by provider Daily. 3/25/22   Mariam Tapia APRN   gabapentin (NEURONTIN) 100 MG capsule Take 100 mg by mouth 3 (three) times a day.    Luis Mahoney MD   omeprazole (priLOSEC) 40 MG capsule Take 40 mg by mouth Daily.    Luis Mahoney MD   ondansetron ODT (ZOFRAN-ODT) 4 MG disintegrating tablet Place 1 tablet on the tongue 4 (Four) Times a Day As Needed for Nausea or Vomiting. 5/20/22   Margaux Masterson APRN   predniSONE (DELTASONE) 50 MG tablet Take 1 tablet by mouth Daily. 5/20/22   Margaux Masterson APRN   sertraline (ZOLOFT) 50 MG tablet Take 50 mg by mouth daily.    Luis Mahoney MD   ticagrelor (BRILINTA) 90 MG tablet tablet Take 90 mg by mouth.    Luis Mahoney MD   tiZANidine (ZANAFLEX) 4 MG tablet Take 4 mg by mouth At Night As Needed for Muscle Spasms.    Luis Mahoney MD        Social History:   Social History     Tobacco Use   • Smoking status: Current Every Day Smoker     Packs/day: 1.00   • Smokeless tobacco: Never Used   Substance Use Topics   • Alcohol use: No   • Drug use: No     Recent travel: not applicable    Review of Systems:  Review of Systems   Constitutional: Negative for chills and fever.   HENT: Negative for congestion, ear pain and sore throat.    Eyes: Negative for pain.   Respiratory: Negative for cough, chest tightness and shortness of breath.    Cardiovascular: Positive for chest pain.   Gastrointestinal: Negative for  "abdominal pain, diarrhea, nausea and vomiting.   Genitourinary: Negative for flank pain and hematuria.   Musculoskeletal: Negative for joint swelling.   Skin: Negative for pallor.   Neurological: Negative for seizures and headaches.   Psychiatric/Behavioral: The patient is nervous/anxious.    All other systems reviewed and are negative.       Physical Exam:  /82 (Patient Position: Lying)   Pulse 73   Temp 98.1 °F (36.7 °C) (Oral)   Resp 20   Ht 165.1 cm (65\")   Wt 62.3 kg (137 lb 5.6 oz)   SpO2 99%   BMI 22.86 kg/m²     Physical Exam  Vitals and nursing note reviewed.   Constitutional:       General: She is not in acute distress.     Appearance: Normal appearance. She is not toxic-appearing.   HENT:      Head: Normocephalic and atraumatic.      Mouth/Throat:      Mouth: Mucous membranes are moist.   Eyes:      General: No scleral icterus.  Cardiovascular:      Rate and Rhythm: Normal rate and regular rhythm.      Pulses: Normal pulses.      Heart sounds: Normal heart sounds.   Pulmonary:      Effort: Pulmonary effort is normal. No respiratory distress.      Breath sounds: Normal breath sounds.   Abdominal:      General: Abdomen is flat.      Palpations: Abdomen is soft.      Tenderness: There is no abdominal tenderness.   Musculoskeletal:         General: Normal range of motion.      Cervical back: Normal range of motion and neck supple.   Skin:     General: Skin is warm and dry.   Neurological:      Mental Status: She is alert and oriented to person, place, and time. Mental status is at baseline.                Medications in the Emergency Department:  Medications   sodium chloride 0.9 % flush 10 mL (has no administration in time range)   aspirin chewable tablet 324 mg (324 mg Oral Given by Other 9/6/22 1505)        Labs  Lab Results (last 24 hours)     Procedure Component Value Units Date/Time    CBC & Differential [[id number]]  (Abnormal) Collected: 09/06/22 1323    Specimen: Blood Updated: 09/06/22 " 1418    Narrative:      The following orders were created for panel order CBC & Differential.  Procedure                               Abnormality         Status                     ---------                               -----------         ------                     CBC Auto Differential[[id number]]        Abnormal            Final result               Scan Slide[[id number]]                                                                    Please view results for these tests on the individual orders.    Comprehensive Metabolic Panel [[id number]]  (Abnormal) Collected: 09/06/22 1323    Specimen: Blood Updated: 09/06/22 1404     Glucose 119 mg/dL      BUN 12 mg/dL      Creatinine 0.53 mg/dL      Sodium 139 mmol/L      Potassium 3.6 mmol/L      Chloride 103 mmol/L      CO2 24.4 mmol/L      Calcium 9.1 mg/dL      Total Protein 7.7 g/dL      Albumin 4.20 g/dL      ALT (SGPT) 18 U/L      AST (SGOT) 19 U/L      Alkaline Phosphatase 120 U/L      Total Bilirubin 0.3 mg/dL      Globulin 3.5 gm/dL      A/G Ratio 1.2 g/dL      BUN/Creatinine Ratio 22.6     Anion Gap 11.6 mmol/L      eGFR 110.1 mL/min/1.73      Comment: National Kidney Foundation and American Society of Nephrology (ASN) Task Force recommended calculation based on the Chronic Kidney Disease Epidemiology Collaboration (CKD-EPI) equation refit without adjustment for race.       Narrative:      GFR Normal >60  Chronic Kidney Disease <60  Kidney Failure <15      Lipase [[id number]]  (Normal) Collected: 09/06/22 1323    Specimen: Blood Updated: 09/06/22 1404     Lipase 36 U/L     BNP [[id number]]  (Normal) Collected: 09/06/22 1323    Specimen: Blood Updated: 09/06/22 1401     proBNP 107.4 pg/mL     Narrative:      Among patients with dyspnea, NT-proBNP is highly sensitive for the detection of acute congestive heart failure. In addition NT-proBNP of <300 pg/ml effectively rules out acute congestive heart failure with 99% negative predictive value.    Results may be  falsely decreased if patient taking Biotin.      Magnesium [[id number]]  (Normal) Collected: 09/06/22 1323    Specimen: Blood Updated: 09/06/22 1404     Magnesium 1.8 mg/dL     CBC Auto Differential [[id number]]  (Abnormal) Collected: 09/06/22 1323    Specimen: Blood Updated: 09/06/22 1418     WBC 10.40 10*3/mm3      RBC 5.00 10*6/mm3      Hemoglobin 14.0 g/dL      Hematocrit 41.8 %      MCV 83.6 fL      MCH 28.0 pg      MCHC 33.5 g/dL      RDW 13.8 %      RDW-SD 42.0 fl      MPV 13.2 fL      Platelets 141 10*3/mm3      Neutrophil % 66.0 %      Lymphocyte % 24.9 %      Monocyte % 6.3 %      Eosinophil % 1.8 %      Basophil % 0.8 %      Immature Grans % 0.2 %      Neutrophils, Absolute 6.87 10*3/mm3      Lymphocytes, Absolute 2.59 10*3/mm3      Monocytes, Absolute 0.65 10*3/mm3      Eosinophils, Absolute 0.19 10*3/mm3      Basophils, Absolute 0.08 10*3/mm3      Immature Grans, Absolute 0.02 10*3/mm3      nRBC 0.0 /100 WBC     POC Troponin I [[id number]]  (Normal) Collected: 09/06/22 1323    Specimen: Blood Updated: 09/06/22 1336     Troponin I 0.00 ng/mL      Comment: Serial Number: [id number]:  [id number]       POC Troponin I [[id number]]  (Normal) Collected: 09/06/22 1530    Specimen: Blood Updated: 09/06/22 1543     Troponin I 0.00 ng/mL      Comment: Serial Number: [id number]:  [id number]              Imaging:  XR Chest 1 View    Result Date: 9/6/2022  PROCEDURE: XR CHEST 1 VW  COMPARISON: [facility], CR, XR CHEST 1 VW, 5/19/2022, 21:30.  INDICATIONS: Chest Pain Triage Protocol  FINDINGS: There is subtle increased density in the lower lobes bilaterally suggesting developing infiltrate. No significant pleural effusions are seen. The cardiac silhouette is within normal limits for size. The mediastinal soft tissues are stable. No acute osseous abnormalities are observed.       Evidence for developing bibasilar pneumonia versus pulmonary edema. Recommend correlation for signs or symptoms of acute infection  and followup to resolution.           CLARY VEGA MD       Electronically Signed and Approved By: CLARY VEGA MD on 9/06/2022 at 13:40               Procedures:  Procedures    Progress  ED Course as of 09/06/22 1614   Tue Sep 06, 2022   1331 EKG: Rate 92, left atrial enlargement, normal QRS, nonspecific ST segment changes, normal QT interval, no previous for comparison [RW]      ED Course User Index  [RW] Lavell Healy MD           HEART Score: 2                  Medical Decision Making:  MDM  Number of Diagnoses or Management Options  Chest pain in adult  Diagnosis management comments: Patient presents complaining of chest pain.  Old records reviewed in epic.  Differential diagnostic considerations include but not limited to ACS versus PE.  PE is low making PE unlikely.  Heart score is low making ACS unlikely.  Cardiac markers are also normal making ACS unlikely.  Chest radiograph does not demonstrate infiltrates as a source of the patient's pain.  She did have emotional stressors earlier in the day and agree this may been the source of her pain is this is caused pain in the past reportedly.  She is pain-free on final assessment and appropriate for outpatient follow-up.  She is to return for worsening pain or other concerns.       Amount and/or Complexity of Data Reviewed  Clinical lab tests: reviewed  Tests in the medicine section of CPT®: reviewed  Decide to obtain previous medical records or to obtain history from someone other than the patient: yes    Risk of Complications, Morbidity, and/or Mortality  Presenting problems: high  Management options: low    Patient Progress  Patient progress: stable       Final diagnoses:   Chest pain in adult        Disposition:  ED Disposition     ED Disposition   Discharge    Condition   Stable    Comment   --             This medical record created using voice recognition software and a virtual scribe.    Documentation assistance provided by Ruth Colindres acting as  scribe for Dr. Lavell Healy MD. Information recorded by the scribe was done at my direction and has been verified and validated by me.       Ruth Colindres  09/06/22 1336       Lavell Healy MD  09/06/22 4043

## 2022-11-26 ENCOUNTER — APPOINTMENT (OUTPATIENT)
Dept: GENERAL RADIOLOGY | Facility: HOSPITAL | Age: 52
End: 2022-11-26

## 2022-11-26 ENCOUNTER — APPOINTMENT (OUTPATIENT)
Dept: CARDIOLOGY | Facility: HOSPITAL | Age: 52
End: 2022-11-26

## 2022-11-26 ENCOUNTER — HOSPITAL ENCOUNTER (OUTPATIENT)
Facility: HOSPITAL | Age: 52
Setting detail: OBSERVATION
Discharge: HOME | End: 2022-11-27
Attending: EMERGENCY MEDICINE | Admitting: INTERNAL MEDICINE

## 2022-11-26 ENCOUNTER — APPOINTMENT (OUTPATIENT)
Dept: CT IMAGING | Facility: HOSPITAL | Age: 52
End: 2022-11-26

## 2022-11-26 ENCOUNTER — APPOINTMENT (OUTPATIENT)
Dept: MRI IMAGING | Facility: HOSPITAL | Age: 52
End: 2022-11-26

## 2022-11-26 DIAGNOSIS — Z78.9 DECREASED ACTIVITIES OF DAILY LIVING (ADL): ICD-10-CM

## 2022-11-26 DIAGNOSIS — R26.2 DIFFICULTY WALKING: ICD-10-CM

## 2022-11-26 DIAGNOSIS — G45.9 TIA (TRANSIENT ISCHEMIC ATTACK): Primary | ICD-10-CM

## 2022-11-26 LAB
ALBUMIN SERPL-MCNC: 4.3 G/DL (ref 3.5–5.2)
ALBUMIN SERPL-MCNC: 4.7 G/DL (ref 3.5–5.2)
ALBUMIN/GLOB SERPL: 1.2 G/DL
ALBUMIN/GLOB SERPL: 1.3 G/DL
ALP SERPL-CCNC: 114 U/L (ref 39–117)
ALP SERPL-CCNC: 127 U/L (ref 39–117)
ALT SERPL W P-5'-P-CCNC: 24 U/L (ref 1–33)
ALT SERPL W P-5'-P-CCNC: 29 U/L (ref 1–33)
ANION GAP SERPL CALCULATED.3IONS-SCNC: 9.5 MMOL/L (ref 5–15)
ANION GAP SERPL CALCULATED.3IONS-SCNC: 9.7 MMOL/L (ref 5–15)
APTT PPP: 27 SECONDS (ref 24.2–34.2)
AST SERPL-CCNC: 24 U/L (ref 1–32)
AST SERPL-CCNC: 25 U/L (ref 1–32)
BACTERIA UR QL AUTO: ABNORMAL /HPF
BASOPHILS # BLD AUTO: 0.05 10*3/MM3 (ref 0–0.2)
BASOPHILS NFR BLD AUTO: 0.5 % (ref 0–1.5)
BH CV ECHO MEAS - AO ROOT DIAM: 3.2 CM
BH CV ECHO MEAS - EDV(MOD-SP2): 50 ML
BH CV ECHO MEAS - EDV(MOD-SP4): 50 ML
BH CV ECHO MEAS - EF(MOD-BP): 58.2 %
BH CV ECHO MEAS - ESV(MOD-SP2): 21 ML
BH CV ECHO MEAS - ESV(MOD-SP4): 21 ML
BH CV ECHO MEAS - IVSD: 1.1 CM
BH CV ECHO MEAS - LA DIMENSION: 3 CM
BH CV ECHO MEAS - LAT PEAK E' VEL: 9.9 CM/SEC
BH CV ECHO MEAS - LVIDD: 4.3 CM
BH CV ECHO MEAS - LVIDS: 2.5 CM
BH CV ECHO MEAS - LVOT DIAM: 2 CM
BH CV ECHO MEAS - LVPWD: 1 CM
BH CV ECHO MEAS - MED PEAK E' VEL: 6.64 CM/SEC
BH CV ECHO MEAS - MV A MAX VEL: 80 CM/SEC
BH CV ECHO MEAS - MV DEC TIME: 174 MSEC
BH CV ECHO MEAS - MV E MAX VEL: 78 CM/SEC
BH CV ECHO MEAS - MV E/A: 1
BH CV ECHO MEAS - RVDD: 2.8 CM
BH CV ECHO MEAS - TAPSE (>1.6): 1.91 CM
BH CV ECHO MEASUREMENTS AVERAGE E/E' RATIO: 9.43
BILIRUB SERPL-MCNC: 0.5 MG/DL (ref 0–1.2)
BILIRUB SERPL-MCNC: 0.5 MG/DL (ref 0–1.2)
BILIRUB UR QL STRIP: NEGATIVE
BUN SERPL-MCNC: 11 MG/DL (ref 6–20)
BUN SERPL-MCNC: 8 MG/DL (ref 6–20)
BUN/CREAT SERPL: 13.6 (ref 7–25)
BUN/CREAT SERPL: 18 (ref 7–25)
CALCIUM SPEC-SCNC: 9.5 MG/DL (ref 8.6–10.5)
CALCIUM SPEC-SCNC: 9.8 MG/DL (ref 8.6–10.5)
CHLORIDE SERPL-SCNC: 102 MMOL/L (ref 98–107)
CHLORIDE SERPL-SCNC: 103 MMOL/L (ref 98–107)
CLARITY UR: CLEAR
CO2 SERPL-SCNC: 26.3 MMOL/L (ref 22–29)
CO2 SERPL-SCNC: 29.5 MMOL/L (ref 22–29)
COLOR UR: YELLOW
CREAT BLDA-MCNC: 0.6 MG/DL
CREAT SERPL-MCNC: 0.59 MG/DL (ref 0.57–1)
CREAT SERPL-MCNC: 0.61 MG/DL (ref 0.57–1)
DEPRECATED RDW RBC AUTO: 45.6 FL (ref 37–54)
DEPRECATED RDW RBC AUTO: 45.9 FL (ref 37–54)
EGFRCR SERPLBLD CKD-EPI 2021: 106.4 ML/MIN/1.73
EGFRCR SERPLBLD CKD-EPI 2021: 106.8 ML/MIN/1.73
EGFRCR SERPLBLD CKD-EPI 2021: 107.3 ML/MIN/1.73
EOSINOPHIL # BLD AUTO: 0.27 10*3/MM3 (ref 0–0.4)
EOSINOPHIL NFR BLD AUTO: 2.8 % (ref 0.3–6.2)
ERYTHROCYTE [DISTWIDTH] IN BLOOD BY AUTOMATED COUNT: 14.3 % (ref 12.3–15.4)
ERYTHROCYTE [DISTWIDTH] IN BLOOD BY AUTOMATED COUNT: 14.6 % (ref 12.3–15.4)
GLOBULIN UR ELPH-MCNC: 3.5 GM/DL
GLOBULIN UR ELPH-MCNC: 3.6 GM/DL
GLUCOSE BLDC GLUCOMTR-MCNC: 93 MG/DL (ref 70–99)
GLUCOSE SERPL-MCNC: 109 MG/DL (ref 65–99)
GLUCOSE SERPL-MCNC: 109 MG/DL (ref 65–99)
GLUCOSE UR STRIP-MCNC: NEGATIVE MG/DL
HCT VFR BLD AUTO: 45.2 % (ref 34–46.6)
HCT VFR BLD AUTO: 46.1 % (ref 34–46.6)
HGB BLD-MCNC: 14.8 G/DL (ref 12–15.9)
HGB BLD-MCNC: 15.2 G/DL (ref 12–15.9)
HGB UR QL STRIP.AUTO: NEGATIVE
HOLD SPECIMEN: NORMAL
HOLD SPECIMEN: NORMAL
HYALINE CASTS UR QL AUTO: ABNORMAL /LPF
IMM GRANULOCYTES # BLD AUTO: 0.03 10*3/MM3 (ref 0–0.05)
IMM GRANULOCYTES NFR BLD AUTO: 0.3 % (ref 0–0.5)
INR PPP: 0.99 (ref 0.86–1.15)
IVRT: 74 MSEC
KETONES UR QL STRIP: NEGATIVE
LEFT ATRIUM VOLUME INDEX: 15.6 ML/M2
LEUKOCYTE ESTERASE UR QL STRIP.AUTO: ABNORMAL
LYMPHOCYTES # BLD AUTO: 2.57 10*3/MM3 (ref 0.7–3.1)
LYMPHOCYTES NFR BLD AUTO: 27.1 % (ref 19.6–45.3)
MAXIMAL PREDICTED HEART RATE: 166 BPM
MCH RBC QN AUTO: 28.4 PG (ref 26.6–33)
MCH RBC QN AUTO: 28.5 PG (ref 26.6–33)
MCHC RBC AUTO-ENTMCNC: 32.7 G/DL (ref 31.5–35.7)
MCHC RBC AUTO-ENTMCNC: 33 G/DL (ref 31.5–35.7)
MCV RBC AUTO: 86.2 FL (ref 79–97)
MCV RBC AUTO: 86.9 FL (ref 79–97)
MONOCYTES # BLD AUTO: 0.76 10*3/MM3 (ref 0.1–0.9)
MONOCYTES NFR BLD AUTO: 8 % (ref 5–12)
NEUTROPHILS NFR BLD AUTO: 5.82 10*3/MM3 (ref 1.7–7)
NEUTROPHILS NFR BLD AUTO: 61.3 % (ref 42.7–76)
NITRITE UR QL STRIP: NEGATIVE
NRBC BLD AUTO-RTO: 0 /100 WBC (ref 0–0.2)
PH UR STRIP.AUTO: 7 [PH] (ref 5–8)
PLATELET # BLD AUTO: 167 10*3/MM3 (ref 140–450)
PLATELET # BLD AUTO: 187 10*3/MM3 (ref 140–450)
PMV BLD AUTO: 12.3 FL (ref 6–12)
PMV BLD AUTO: 12.3 FL (ref 6–12)
POTASSIUM SERPL-SCNC: 4 MMOL/L (ref 3.5–5.2)
POTASSIUM SERPL-SCNC: 4 MMOL/L (ref 3.5–5.2)
PROT SERPL-MCNC: 7.9 G/DL (ref 6–8.5)
PROT SERPL-MCNC: 8.2 G/DL (ref 6–8.5)
PROT UR QL STRIP: NEGATIVE
PROTHROMBIN TIME: 13.2 SECONDS (ref 11.8–14.9)
RBC # BLD AUTO: 5.2 10*6/MM3 (ref 3.77–5.28)
RBC # BLD AUTO: 5.35 10*6/MM3 (ref 3.77–5.28)
RBC # UR STRIP: ABNORMAL /HPF
REF LAB TEST METHOD: ABNORMAL
SODIUM SERPL-SCNC: 139 MMOL/L (ref 136–145)
SODIUM SERPL-SCNC: 141 MMOL/L (ref 136–145)
SP GR UR STRIP: >1.03 (ref 1–1.03)
SQUAMOUS #/AREA URNS HPF: ABNORMAL /HPF
STRESS TARGET HR: 141 BPM
TROPONIN T SERPL-MCNC: <0.01 NG/ML (ref 0–0.03)
UROBILINOGEN UR QL STRIP: ABNORMAL
WBC # UR STRIP: ABNORMAL /HPF
WBC NRBC COR # BLD: 11.04 10*3/MM3 (ref 3.4–10.8)
WBC NRBC COR # BLD: 9.5 10*3/MM3 (ref 3.4–10.8)
WHOLE BLOOD HOLD COAG: NORMAL
WHOLE BLOOD HOLD SPECIMEN: NORMAL

## 2022-11-26 PROCEDURE — 0042T HC CT CEREBRAL PERFUSION W/WO CONTRAST: CPT

## 2022-11-26 PROCEDURE — 70496 CT ANGIOGRAPHY HEAD: CPT

## 2022-11-26 PROCEDURE — 85730 THROMBOPLASTIN TIME PARTIAL: CPT | Performed by: EMERGENCY MEDICINE

## 2022-11-26 PROCEDURE — 93306 TTE W/DOPPLER COMPLETE: CPT | Performed by: INTERNAL MEDICINE

## 2022-11-26 PROCEDURE — 84484 ASSAY OF TROPONIN QUANT: CPT | Performed by: EMERGENCY MEDICINE

## 2022-11-26 PROCEDURE — 70450 CT HEAD/BRAIN W/O DYE: CPT

## 2022-11-26 PROCEDURE — G0378 HOSPITAL OBSERVATION PER HR: HCPCS

## 2022-11-26 PROCEDURE — 81001 URINALYSIS AUTO W/SCOPE: CPT | Performed by: EMERGENCY MEDICINE

## 2022-11-26 PROCEDURE — 99285 EMERGENCY DEPT VISIT HI MDM: CPT

## 2022-11-26 PROCEDURE — 96372 THER/PROPH/DIAG INJ SC/IM: CPT

## 2022-11-26 PROCEDURE — 85027 COMPLETE CBC AUTOMATED: CPT | Performed by: INTERNAL MEDICINE

## 2022-11-26 PROCEDURE — 85610 PROTHROMBIN TIME: CPT | Performed by: EMERGENCY MEDICINE

## 2022-11-26 PROCEDURE — 82962 GLUCOSE BLOOD TEST: CPT

## 2022-11-26 PROCEDURE — 36415 COLL VENOUS BLD VENIPUNCTURE: CPT | Performed by: INTERNAL MEDICINE

## 2022-11-26 PROCEDURE — 93010 ELECTROCARDIOGRAM REPORT: CPT | Performed by: INTERNAL MEDICINE

## 2022-11-26 PROCEDURE — 85025 COMPLETE CBC W/AUTO DIFF WBC: CPT | Performed by: EMERGENCY MEDICINE

## 2022-11-26 PROCEDURE — 70551 MRI BRAIN STEM W/O DYE: CPT

## 2022-11-26 PROCEDURE — 80053 COMPREHEN METABOLIC PANEL: CPT | Performed by: EMERGENCY MEDICINE

## 2022-11-26 PROCEDURE — 80053 COMPREHEN METABOLIC PANEL: CPT | Performed by: INTERNAL MEDICINE

## 2022-11-26 PROCEDURE — 99406 BEHAV CHNG SMOKING 3-10 MIN: CPT | Performed by: INTERNAL MEDICINE

## 2022-11-26 PROCEDURE — 25010000002 ENOXAPARIN PER 10 MG: Performed by: INTERNAL MEDICINE

## 2022-11-26 PROCEDURE — 93005 ELECTROCARDIOGRAM TRACING: CPT | Performed by: EMERGENCY MEDICINE

## 2022-11-26 PROCEDURE — 99220 PR INITIAL OBSERVATION CARE/DAY 70 MINUTES: CPT | Performed by: INTERNAL MEDICINE

## 2022-11-26 PROCEDURE — 72170 X-RAY EXAM OF PELVIS: CPT

## 2022-11-26 PROCEDURE — 93306 TTE W/DOPPLER COMPLETE: CPT

## 2022-11-26 PROCEDURE — 82565 ASSAY OF CREATININE: CPT

## 2022-11-26 PROCEDURE — 71045 X-RAY EXAM CHEST 1 VIEW: CPT

## 2022-11-26 PROCEDURE — 0 IOPAMIDOL PER 1 ML: Performed by: EMERGENCY MEDICINE

## 2022-11-26 PROCEDURE — 70498 CT ANGIOGRAPHY NECK: CPT

## 2022-11-26 RX ORDER — SODIUM CHLORIDE 0.9 % (FLUSH) 0.9 %
10 SYRINGE (ML) INJECTION AS NEEDED
Status: DISCONTINUED | OUTPATIENT
Start: 2022-11-26 | End: 2022-11-27 | Stop reason: HOSPADM

## 2022-11-26 RX ORDER — ONDANSETRON 2 MG/ML
4 INJECTION INTRAMUSCULAR; INTRAVENOUS EVERY 6 HOURS PRN
Status: DISCONTINUED | OUTPATIENT
Start: 2022-11-26 | End: 2022-11-27 | Stop reason: HOSPADM

## 2022-11-26 RX ORDER — SODIUM CHLORIDE 9 MG/ML
40 INJECTION, SOLUTION INTRAVENOUS AS NEEDED
Status: DISCONTINUED | OUTPATIENT
Start: 2022-11-26 | End: 2022-11-27 | Stop reason: HOSPADM

## 2022-11-26 RX ORDER — ASPIRIN 81 MG/1
81 TABLET, CHEWABLE ORAL DAILY
Status: DISCONTINUED | OUTPATIENT
Start: 2022-11-26 | End: 2022-11-27 | Stop reason: HOSPADM

## 2022-11-26 RX ORDER — CLOPIDOGREL BISULFATE 75 MG/1
75 TABLET ORAL DAILY
Status: DISCONTINUED | OUTPATIENT
Start: 2022-11-26 | End: 2022-11-27 | Stop reason: HOSPADM

## 2022-11-26 RX ORDER — ASPIRIN 300 MG/1
300 SUPPOSITORY RECTAL DAILY
Status: DISCONTINUED | OUTPATIENT
Start: 2022-11-26 | End: 2022-11-27 | Stop reason: HOSPADM

## 2022-11-26 RX ORDER — TRAMADOL HYDROCHLORIDE 50 MG/1
50 TABLET ORAL EVERY 6 HOURS PRN
Status: DISCONTINUED | OUTPATIENT
Start: 2022-11-26 | End: 2022-11-27 | Stop reason: HOSPADM

## 2022-11-26 RX ORDER — AMLODIPINE BESYLATE 10 MG/1
10 TABLET ORAL DAILY
COMMUNITY
Start: 2022-03-29 | End: 2023-03-29

## 2022-11-26 RX ORDER — HYDROXYZINE PAMOATE 25 MG/1
25 CAPSULE ORAL DAILY
COMMUNITY

## 2022-11-26 RX ORDER — ATORVASTATIN CALCIUM 40 MG/1
80 TABLET, FILM COATED ORAL NIGHTLY
Status: DISCONTINUED | OUTPATIENT
Start: 2022-11-26 | End: 2022-11-27 | Stop reason: HOSPADM

## 2022-11-26 RX ORDER — ENOXAPARIN SODIUM 100 MG/ML
40 INJECTION SUBCUTANEOUS DAILY
Status: DISCONTINUED | OUTPATIENT
Start: 2022-11-26 | End: 2022-11-27 | Stop reason: HOSPADM

## 2022-11-26 RX ORDER — NICOTINE 21 MG/24HR
1 PATCH, TRANSDERMAL 24 HOURS TRANSDERMAL
Status: DISCONTINUED | OUTPATIENT
Start: 2022-11-26 | End: 2022-11-27 | Stop reason: HOSPADM

## 2022-11-26 RX ORDER — SODIUM CHLORIDE 0.9 % (FLUSH) 0.9 %
10 SYRINGE (ML) INJECTION EVERY 12 HOURS SCHEDULED
Status: DISCONTINUED | OUTPATIENT
Start: 2022-11-26 | End: 2022-11-27 | Stop reason: HOSPADM

## 2022-11-26 RX ORDER — PROPRANOLOL HYDROCHLORIDE 80 MG/1
80 CAPSULE, EXTENDED RELEASE ORAL DAILY
COMMUNITY

## 2022-11-26 RX ADMIN — Medication 10 ML: at 21:28

## 2022-11-26 RX ADMIN — IOPAMIDOL 150 ML: 755 INJECTION, SOLUTION INTRAVENOUS at 07:38

## 2022-11-26 RX ADMIN — TRAMADOL HYDROCHLORIDE 50 MG: 50 TABLET, COATED ORAL at 13:33

## 2022-11-26 RX ADMIN — ENOXAPARIN SODIUM 40 MG: 100 INJECTION SUBCUTANEOUS at 13:24

## 2022-11-26 RX ADMIN — ATORVASTATIN CALCIUM 80 MG: 40 TABLET, FILM COATED ORAL at 21:27

## 2022-11-26 RX ADMIN — CLOPIDOGREL BISULFATE 75 MG: 75 TABLET ORAL at 13:24

## 2022-11-26 RX ADMIN — Medication 10 ML: at 13:25

## 2022-11-26 RX ADMIN — NICOTINE 1 PATCH: 21 PATCH, EXTENDED RELEASE TRANSDERMAL at 13:24

## 2022-11-27 ENCOUNTER — READMISSION MANAGEMENT (OUTPATIENT)
Dept: CALL CENTER | Facility: HOSPITAL | Age: 52
End: 2022-11-27

## 2022-11-27 VITALS
WEIGHT: 144.4 LBS | OXYGEN SATURATION: 97 % | DIASTOLIC BLOOD PRESSURE: 74 MMHG | RESPIRATION RATE: 18 BRPM | BODY MASS INDEX: 23.21 KG/M2 | TEMPERATURE: 97.9 F | SYSTOLIC BLOOD PRESSURE: 154 MMHG | HEART RATE: 76 BPM | HEIGHT: 66 IN

## 2022-11-27 LAB
ALBUMIN SERPL-MCNC: 4.2 G/DL (ref 3.5–5.2)
ALBUMIN/GLOB SERPL: 1.3 G/DL
ALP SERPL-CCNC: 109 U/L (ref 39–117)
ALT SERPL W P-5'-P-CCNC: 24 U/L (ref 1–33)
ANION GAP SERPL CALCULATED.3IONS-SCNC: 9.8 MMOL/L (ref 5–15)
AST SERPL-CCNC: 20 U/L (ref 1–32)
BILIRUB SERPL-MCNC: 0.5 MG/DL (ref 0–1.2)
BUN SERPL-MCNC: 11 MG/DL (ref 6–20)
BUN/CREAT SERPL: 16.2 (ref 7–25)
CALCIUM SPEC-SCNC: 9.9 MG/DL (ref 8.6–10.5)
CHLORIDE SERPL-SCNC: 102 MMOL/L (ref 98–107)
CHOLEST SERPL-MCNC: 186 MG/DL (ref 0–200)
CO2 SERPL-SCNC: 27.2 MMOL/L (ref 22–29)
CREAT SERPL-MCNC: 0.68 MG/DL (ref 0.57–1)
DEPRECATED RDW RBC AUTO: 45 FL (ref 37–54)
EGFRCR SERPLBLD CKD-EPI 2021: 103.6 ML/MIN/1.73
ERYTHROCYTE [DISTWIDTH] IN BLOOD BY AUTOMATED COUNT: 14.1 % (ref 12.3–15.4)
GLOBULIN UR ELPH-MCNC: 3.3 GM/DL
GLUCOSE SERPL-MCNC: 128 MG/DL (ref 65–99)
HBA1C MFR BLD: 5.6 % (ref 4.8–5.6)
HCT VFR BLD AUTO: 43.6 % (ref 34–46.6)
HDLC SERPL-MCNC: 50 MG/DL (ref 40–60)
HGB BLD-MCNC: 14.5 G/DL (ref 12–15.9)
LDLC SERPL CALC-MCNC: 119 MG/DL (ref 0–100)
LDLC/HDLC SERPL: 2.36 {RATIO}
MCH RBC QN AUTO: 28.5 PG (ref 26.6–33)
MCHC RBC AUTO-ENTMCNC: 33.3 G/DL (ref 31.5–35.7)
MCV RBC AUTO: 85.8 FL (ref 79–97)
PLATELET # BLD AUTO: 152 10*3/MM3 (ref 140–450)
PMV BLD AUTO: 12.3 FL (ref 6–12)
POTASSIUM SERPL-SCNC: 4.5 MMOL/L (ref 3.5–5.2)
PROT SERPL-MCNC: 7.5 G/DL (ref 6–8.5)
QT INTERVAL: 412 MS
RBC # BLD AUTO: 5.08 10*6/MM3 (ref 3.77–5.28)
SODIUM SERPL-SCNC: 139 MMOL/L (ref 136–145)
TRIGL SERPL-MCNC: 91 MG/DL (ref 0–150)
VLDLC SERPL-MCNC: 17 MG/DL (ref 5–40)
WBC NRBC COR # BLD: 7.61 10*3/MM3 (ref 3.4–10.8)

## 2022-11-27 PROCEDURE — 99217 PR OBSERVATION CARE DISCHARGE MANAGEMENT: CPT | Performed by: INTERNAL MEDICINE

## 2022-11-27 PROCEDURE — 96372 THER/PROPH/DIAG INJ SC/IM: CPT

## 2022-11-27 PROCEDURE — 80053 COMPREHEN METABOLIC PANEL: CPT | Performed by: INTERNAL MEDICINE

## 2022-11-27 PROCEDURE — 85027 COMPLETE CBC AUTOMATED: CPT | Performed by: INTERNAL MEDICINE

## 2022-11-27 PROCEDURE — 97165 OT EVAL LOW COMPLEX 30 MIN: CPT

## 2022-11-27 PROCEDURE — G0378 HOSPITAL OBSERVATION PER HR: HCPCS

## 2022-11-27 PROCEDURE — 25010000002 ENOXAPARIN PER 10 MG: Performed by: INTERNAL MEDICINE

## 2022-11-27 PROCEDURE — 97161 PT EVAL LOW COMPLEX 20 MIN: CPT

## 2022-11-27 PROCEDURE — 80061 LIPID PANEL: CPT | Performed by: INTERNAL MEDICINE

## 2022-11-27 PROCEDURE — 83036 HEMOGLOBIN GLYCOSYLATED A1C: CPT | Performed by: INTERNAL MEDICINE

## 2022-11-27 RX ORDER — ATORVASTATIN CALCIUM 80 MG/1
80 TABLET, FILM COATED ORAL NIGHTLY
Qty: 30 TABLET | Refills: 0 | Status: SHIPPED | OUTPATIENT
Start: 2022-11-27

## 2022-11-27 RX ORDER — NICOTINE 21 MG/24HR
1 PATCH, TRANSDERMAL 24 HOURS TRANSDERMAL
Qty: 14 EACH | Refills: 0 | Status: SHIPPED | OUTPATIENT
Start: 2022-11-28 | End: 2023-01-16

## 2022-11-27 RX ADMIN — ENOXAPARIN SODIUM 40 MG: 100 INJECTION SUBCUTANEOUS at 08:12

## 2022-11-27 RX ADMIN — NICOTINE 1 PATCH: 21 PATCH, EXTENDED RELEASE TRANSDERMAL at 08:13

## 2022-11-27 RX ADMIN — ASPIRIN 81 MG CHEWABLE TABLET 81 MG: 81 TABLET CHEWABLE at 08:12

## 2022-11-27 RX ADMIN — Medication 10 ML: at 08:13

## 2022-11-27 RX ADMIN — CLOPIDOGREL BISULFATE 75 MG: 75 TABLET ORAL at 08:12

## 2022-11-28 ENCOUNTER — TELEPHONE (OUTPATIENT)
Dept: NEUROLOGY | Facility: CLINIC | Age: 52
End: 2022-11-28

## 2022-11-28 NOTE — TELEPHONE ENCOUNTER
Caller: CAROL TAYLOR    Relationship to patient: SISTER Chaka call back number: [phone]     New or established patient?  [x] New  [] Established    Date of discharge: 11/27/22    Facility discharged from:     Diagnosis/Symptoms: TIA    Length of stay (If applicable): 1.5 DAYS    Specialty Only: Did you see a [facility] health provider?    [] Yes  [x] No  If so, who? N/A    CAROL TAYLOR TELEPHONED REQUESTING HOSP F/U APPT FOR HER SISTER FABIANO KRISHNA FOR TIA W/ DR LEHMAN IN 3 WKS PER DISCHARGE SUMMARY. PATIENT WAS SEEN & TREATED @ . NO PREV NEURO SEEN PER PATIENT.     OK TO SCHEDULE? IF SO WITH WHICH PROVIDER    PLEASE ADVISE     THANK YOU

## 2022-11-28 NOTE — OUTREACH NOTE
Prep Survey    Flowsheet Row Responses   [facility] facility patient discharged from? [facility]   Is LACE score < 7 ? Yes   Emergency Room discharge w/ pulse ox? No   Eligibility Readm Mgmt   Discharge diagnosis TIA   Does the patient have one of the following disease processes/diagnoses(primary or secondary)? Stroke   Does the patient have Home health ordered? No   Is there a DME ordered? Yes   What DME was ordered? [facility]   Prep survey completed? Yes          STAN MARTIN - Registered Nurse

## 2022-12-02 ENCOUNTER — READMISSION MANAGEMENT (OUTPATIENT)
Dept: CALL CENTER | Facility: HOSPITAL | Age: 52
End: 2022-12-02

## 2022-12-02 NOTE — OUTREACH NOTE
Stroke Week 1 Survey    Flowsheet Row Responses   [facility] patient discharged from? [facility]   Does the patient have one of the following disease processes/diagnoses(primary or secondary)? Stroke   Week 1 attempt successful? Yes   Call start time 1620   Call end time 1625   Discharge diagnosis TIA   Is patient permission given to speak with other caregiver? Yes   Meds reviewed with patient/caregiver? Yes   Is the patient having any side effects they believe may be caused by any medication additions or changes? No   Does the patient have all medications ordered at discharge? Yes   Is the patient taking all medications as directed (includes completed medication regime)? Yes   Does the patient have a primary care provider?  Yes   Does the patient have an appointment with their PCP within 7 days of discharge? Yes   Has the patient kept scheduled appointments due by today? Yes   Has home health visited the patient within 72 hours of discharge? N/A   What DME was ordered? [facility]   Psychosocial issues? No   Does the patient require any assistance with activities of daily living such as eating, bathing, dressing, walking, etc.? No   Does the patient have any residual symptoms from stroke/TIA? No   Residual symptoms comments Weakness   Does the patient understand the diet ordered at discharge? Yes   Did the patient receive a copy of their discharge instructions? Yes   Nursing interventions Reviewed instructions with patient   What is the patient's perception of their health status since discharge? Improving   Nursing interventions Nurse provided patient education   Is the patient/caregiver able to teach back the risk factors for a stroke? High blood pressure-goal below 120/80, Smoking   Is the patient/caregiver able to teach back signs and symptoms related to disease process for when to call PCP? Yes   Is the patient/caregiver able to teach back signs and symptoms related to disease process for when to call 911? Yes   If  the patient is a current smoker, are they able to teach back resources for cessation? [id number]   Is the patient/caregiver able to teach back the hierarchy of who to call/visit for symptoms/problems? PCP, Specialist, Home health nurse, Urgent Care, ED, 911 Yes   Additional teach back comments She says she is improving a little, says her hip is still troublesome.   Is the patient able to teach back FAST for Stroke? B alance: Watch for sudden loss of balance, E yes: Check for vision loss, F ace: Look for an uneven smile, A rm: Check if one arm is weak, S peech: Listen for slurred speech, T karen: Call 9-1-1 right away   Week 1 call completed? Yes   Is the patient interested in additional calls from an ambulatory ?  NOTE:  applies to high risk patients requiring additional follow-up. No   Wrap up additional comments Improving.          FAITH MARTIN - Registered Nurse

## 2022-12-19 ENCOUNTER — OFFICE VISIT (OUTPATIENT)
Dept: ORTHOPEDIC SURGERY | Facility: CLINIC | Age: 52
End: 2022-12-19

## 2022-12-19 VITALS — WEIGHT: 153 LBS | HEIGHT: 66 IN | HEART RATE: 72 BPM | BODY MASS INDEX: 24.59 KG/M2 | OXYGEN SATURATION: 98 %

## 2022-12-19 DIAGNOSIS — M16.11 PRIMARY OSTEOARTHRITIS OF RIGHT HIP: Primary | ICD-10-CM

## 2022-12-19 DIAGNOSIS — S73.191A TEAR OF RIGHT ACETABULAR LABRUM, INITIAL ENCOUNTER: ICD-10-CM

## 2022-12-19 PROCEDURE — 99203 OFFICE O/P NEW LOW 30 MIN: CPT | Performed by: STUDENT IN AN ORGANIZED HEALTH CARE EDUCATION/TRAINING PROGRAM

## 2022-12-19 RX ORDER — METHYLPREDNISOLONE 4 MG/1
1 TABLET ORAL DAILY
Qty: 21 TABLET | Refills: 0 | Status: SHIPPED | OUTPATIENT
Start: 2022-12-19 | End: 2023-01-16

## 2022-12-19 NOTE — PROGRESS NOTES
"Chief Complaint  Pain and Initial Evaluation of the Right Hip    Subjective          Zoya Crook presents to [facility] for   History of Present Illness    The patient presents here today for evaluation of the right hip. She reports hip pain for a couple years. She has had previous bursitis injections prior to COVID. She has groin pain, lateral hip pain and low back pain. She recently had a TIA. She has a catching feeling in the hip with sharp pains. She denies injury.   Allergies   Allergen Reactions   • Acetaminophen Unknown - High Severity   • Naproxen Other (See Comments)     GI UPSET/ HX ULCER   • Ciprofloxacin Rash        Social History     Socioeconomic History   • Marital status:    Tobacco Use   • Smoking status: Every Day     Packs/day: 1.00     Years: 30.00     Pack years: 30.00     Types: Cigarettes   • Smokeless tobacco: Never   Substance and Sexual Activity   • Alcohol use: No   • Drug use: No   • Sexual activity: Yes     Partners: Male     Birth control/protection: Natural family planning/Rhythm        I reviewed the patient's chief complaint, history of present illness, review of systems, past medical history, surgical history, family history, social history, medications, and allergy list.     REVIEW OF SYSTEMS    Constitutional: Denies fevers, chills, weight loss  Cardiovascular: Denies chest pain, shortness of breath  Skin: Denies rashes, acute skin changes  Neurologic: Denies headache, loss of consciousness  MSK: Right hip pain      Objective   Vital Signs:   Pulse 72   Ht 167.6 cm (66\")   Wt 69.4 kg (153 lb)   SpO2 98%   BMI 24.69 kg/m²     Body mass index is 24.69 kg/m².    Physical Exam    General: Alert. No acute distress.   Right hip- antalgic gait. Non-tender to the bursa. Flexion 100 with pain. Positive impingement. Internal rotation 20. External Rotation 45. Positive EHL, FHL, GS and TA. Sensation intact to all 5 nerves of the foot. Positive " pulses. Neurovascularly intact. Full knee extension flexion 120. No knee effusion or joint line pain. 4/5 hip flexion with pain.     Procedures    Imaging Results (Most Recent)     None                   Assessment and Plan        Adult Transthoracic Echo Complete W/ Cont if Necessary Per Protocol    Result Date: 11/26/2022  Narrative: •  Calculated left ventricular EF = 58.2% •  Left ventricular wall thickness is consistent with mild septal asymmetric hypertrophy. •  Left ventricular diastolic function is consistent with (grade I) impaired relaxation. •  No hemodynamically significant valvular pathology.     CT Angiogram Neck    Result Date: 11/26/2022  Narrative: PROCEDURE: CT ANGIOGRAM NECK  COMPARISON: None  INDICATIONS: Neuro deficit, acute, stroke suspected  PROTOCOL:   Standard imaging protocol performed    RADIATION:   DLP: 227.2 mGy*cm   Automated exposure control was utilized to minimize radiation dose. CONTRAST: 75 cc Isovue 370 I.V. LABS:   eGFR: >60 ml/min/1.73m2  TECHNIQUE: After obtaining the patient's consent, CT images of the neck were obtained without and with non-ionic intravenous contrast material. Multi-planar reformatted/3-D images were created to optimize visualization of vascular anatomy. Unless otherwise stated in this report, all vascular stenoses involving the internal carotid arteries reported for this examination are derived by dividing the lesion diameter by the diameter of the normal internal carotid artery more distally.  FINDINGS:  There is mild-to-moderate atherosclerotic calcification of the aortic arch and origins of the great vessels.  No hemodynamically significant stenosis is seen.  On the right, atherosclerotic calcification of the carotid bulb produces approximately 30% stenosis.  The external carotid artery appears unremarkable.  Atherosclerotic stenosis involving the origin of the internal carotid artery produces approximately 39% stenosis.  The remainder of the cervical  ICA appears unremarkable.  On the left, atherosclerotic calcification of the carotid bulb produces less than 20% stenosis.  Mild atherosclerotic calcification of the origin of the external carotid artery produces less than 20% stenosis.  No appreciable stenosis of the internal carotid artery is identified.  The vertebral arteries are codominant.  No appreciable stenosis of either vessel is identified.  The thyroid appears somewhat heterogeneous.  In the superior right thyroid lobe is a 0.6 cm hypodense nodule.  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  Several mildly prominent cervical lymph nodes are noted in the neck bilaterally.  The largest are level 2 lymph nodes bilaterally measuring up to 1.0 cm in short axis.  Moderate emphysematous changes are noted in the upper lung fields bilaterally.  No focal osseous lesion is seen.      Impression:   1. No hemodynamically significant stenosis of the major cervical arteries, as above 2. Heterogeneous appearance of the thyroid may be secondary to a history of thyroiditis and or small nodules. 3. Multiple cervical lymph nodes bilaterally at the upper limit of normal for size.     Manolo Vigil M.D.       Electronically Signed and Approved By: Manolo Vigil M.D. on 11/26/2022 at 8:50             MRI Brain Without Contrast    Result Date: 11/26/2022  Narrative: PROCEDURE: MRI BRAIN WO CONTRAST  COMPARISON: [facility], CT, CT HEAD WO CONTRAST STROKE PROTOCOL, 11/26/2022, 7:31.  INDICATIONS: right upper/lower extremity paresthesia      TECHNIQUE: A variety of imaging planes and parameters were utilized for visualization of suspected pathology.  Images were performed without contrast.  FINDINGS:  Diffusion-weighted images are negative for acute infarction.  There is no evidence of intracranial hemorrhage on susceptibility weighted imaging.  There is mild T2 high signal abnormality  scattered in the cerebral white matter bilaterally.  The ventricles are normal in size and configuration.  Intravenous contrast was not given to assess for abnormal enhancement.  Mucosal inflammatory changes are noted in the bilateral ethmoid sinuses and right maxillary sinus.  No focal calvarial abnormality is seen.      Impression:   1. No acute infarction or intracranial hemorrhage 2. Mild signal abnormality in the cerebral white matter , nonspecific in appearance but most likely representing small vessel ischemic disease in a patient of this age. 3. Prominent mucosal inflammatory changes in the bilateral ethmoid and right maxillary sinuses.      Manolo Vigil M.D.       Electronically Signed and Approved By: Manolo Vigil M.D. on 11/26/2022 at 14:59             XR Chest 1 View    Result Date: 11/26/2022  Narrative: PROCEDURE: XR CHEST 1 VW  COMPARISON: [facility], , XR CHEST 1 VW, 9/06/2022, 13:31.  INDICATIONS: Acute Stroke Protocol (Onset < 12 hrs)  FINDINGS:  No acute infiltrate or effusion is seen.  The cardiac and mediastinal silhouettes appear normal.      Impression:   1. No acute cardiopulmonary disease       Manolo Vigil M.D.       Electronically Signed and Approved By: Manolo Vigil M.D. on 11/26/2022 at 8:32             XR Pelvis 1 or 2 View    Result Date: 11/26/2022  Narrative: PROCEDURE: XR PELVIS 1 OR 2 VW  COMPARISON: None  INDICATIONS: RIGHT HIP PAIN/GROIN PAIN.  FINDINGS:  Contrast fills the urinary bladder.  This obscures portions of the osseous pelvis.  No fracture or malalignment is identified.  Mild bilateral hip osteoarthritic spurring is noted.      Impression:   1. No acute finding 2. Mild bilateral hip osteoarthritis      Manolo Vigil M.D.       Electronically Signed and Approved By: Manolo Vigil M.D. on 11/26/2022 at 13:31             CT Head Without Contrast Stroke Protocol    Result Date: 11/26/2022  Narrative: PROCEDURE: CT HEAD WO CONTRAST STROKE PROTOCOL   COMPARISON:  None INDICATIONS: Neuro deficit, acute, stroke suspected  PROTOCOL:   Standard imaging protocol performed    RADIATION:   DLP: 960.4 mGy*cm   MA and/or KV was adjusted to minimize radiation dose.     TECHNIQUE: After obtaining the patient's consent, CT images were obtained without non-ionic intravenous contrast material.  FINDINGS:  Mild low density is noted in the periventricular white matter consistent with small vessel ischemic disease.  The ventricles are normal in size and configuration.  There is no specific CT evidence of acute infarction or intracranial hemorrhage.  Marked mucosal inflammatory thickening is noted in the right maxillary sinus.      Impression:   1. Mild small vessel ischemic changes in the white matter     Manolo Vigil M.D.       Electronically Signed and Approved By: Manolo Vigil M.D. on 11/26/2022 at 7:49             CT Angiogram Head w AI Analysis of LVO    Result Date: 11/26/2022  Narrative: PROCEDURE: CT ANGIOGRAM HEAD W AI ANALYSIS OF LVO  COMPARISON: [facility], CT, CT HEAD WO CONTRAST STROKE PROTOCOL, 11/26/2022, 7:31.  INDICATIONS: Neuro deficit, acute, stroke suspected  PROTOCOL:   Standard imaging protocol performed    RADIATION:   DLP: 227.2 mGy*cm   Automated exposure control was utilized to minimize radiation dose. CONTRAST: 75 cc Isovue 370 I.V. LABS:   eGFR: >60 ml/min/1.73m2  TECHNIQUE: After obtaining the patient's consent, CT images of the head were obtained without and with non-ionic contrast, and multi-planar/3-D imaging were created and interpreted to optimize visualization of vascular anatomy.   FINDINGS:  There is mild atherosclerotic calcification involving the intracranial segments of the right and left internal carotid arteries.  Stenoses range up to approximately 22%.  The anterior, middle and posterior cerebral arteries appear within normal limits bilaterally with no appreciable stenosis.  The basilar artery appears unremarkable.  The  intracranial segments of both vertebral arteries appear unremarkable.  No intracranial aneurysm is identified.  No vascular irregularity is seen to suggest vasculitis.      Impression:   1. Relatively mild atherosclerotic calcification involving the intracranial segments of both internal carotid arteries. 2. No hemodynamically significant stenosis of the intracranial vasculature.     Manolo Vigil M.D.       Electronically Signed and Approved By: Manolo Vigil M.D. on 11/26/2022 at 9:18             CT CEREBRAL PERFUSION WITH & WITHOUT CONTRAST    Result Date: 11/26/2022  Narrative: PROCEDURE: CT CEREBRAL PERFUSION W WO CONTRAST  COMPARISON: None  INDICATIONS: Neuro deficit, acute, stroke suspected  PROTOCOL:   Standard imaging protocol performed    RADIATION:   DLP: 1293.6 mGy*cm   Automated exposure control was utilized to minimize radiation dose. CONTRAST: 80 cc Isovue 370 I.V. LABS:   eGFR: >60 ml/min/1.73m2   FINDINGS:  The examination appears technically adequate.  There is no brain volume demonstrating a T-max greater than 6 seconds to suggest ischemic brain at risk.  There is no brain volume which demonstrates cerebral blood flow less than 30% predicted to suggest core infarction.      Impression:   1. Study within normal limits.      Manolo Vigil M.D.       Electronically Signed and Approved By: Manolo Vigil M.D. on 11/26/2022 at 7:57                Diagnoses and all orders for this visit:    1. Primary osteoarthritis of right hip (Primary)  -     [place] Contrast Injection CT / MRI; Future  -     MRI hip right arthrogram; Future  -     methylPREDNISolone (MEDROL) 4 MG dose pack; Take 1 tablet by mouth Daily. Use as directed by package instructions  Dispense: 21 tablet; Refill: 0    2. Tear of right acetabular labrum, initial encounter  -     [place] Contrast Injection CT / MRI; Future  -     MRI hip right arthrogram; Future  -     methylPREDNISolone (MEDROL) 4 MG dose pack; Take 1 tablet by mouth Daily. Use as  directed by package instructions  Dispense: 21 tablet; Refill: 0        Discussed the treatment plan with the patient.  I reviewed the x-rays that were obtained previously with the patient. Plan for MRI Arthrogram of the right hip to evaluate the labrum. The patient expressed understanding and wished to proceed. Prescription for a medrol dose pack given today.       Educated on risk of smoking. Discussed options for smoking cessation. and Call or return if worsening symptoms.    Scribed for Pj Pryor MD by Ruth Marshall  12/19/2022   10:07 EST         Follow Up       MRI Arthrogram results.     Patient was given instructions and counseling regarding her condition or for health maintenance advice. Please see specific information pulled into the AVS if appropriate.       I have personally performed the services described in this document as scribed by the above individual and it is both accurate and complete.     Pj Pryor MD  12/20/22  13:09 EST

## 2022-12-22 ENCOUNTER — OFFICE VISIT (OUTPATIENT)
Dept: NEUROLOGY | Facility: CLINIC | Age: 52
End: 2022-12-22

## 2022-12-22 VITALS
WEIGHT: 153 LBS | SYSTOLIC BLOOD PRESSURE: 144 MMHG | BODY MASS INDEX: 24.59 KG/M2 | HEART RATE: 87 BPM | DIASTOLIC BLOOD PRESSURE: 78 MMHG | HEIGHT: 66 IN

## 2022-12-22 DIAGNOSIS — F32.A ANXIETY AND DEPRESSION: ICD-10-CM

## 2022-12-22 DIAGNOSIS — F41.9 ANXIETY AND DEPRESSION: ICD-10-CM

## 2022-12-22 DIAGNOSIS — G45.9 TIA (TRANSIENT ISCHEMIC ATTACK): Primary | ICD-10-CM

## 2022-12-22 PROCEDURE — 99203 OFFICE O/P NEW LOW 30 MIN: CPT | Performed by: PSYCHIATRY & NEUROLOGY

## 2022-12-22 NOTE — PROGRESS NOTES
"Chief Complaint  Stroke    Subjective          Zoya Crook is a 52 y.o. female who presents to [facility]  History of Present Illness  52-year-old woman evaluated for an event that occurred last month.  She states that she was sleeping around 4:00 to 4:30 in the morning she got up she almost fell down because her right leg gave out on her.  She states that she also had numbness in her right arm, face and she had a hard time reaching her face with the right arm since her right arm was weak.  She states that she was taken to the emergency room and symptoms completely resolved after 3 to 4 hours.  She had an MRI of the brain which is negative as well as a CT angio of the head and neck as well as CT perfusion was unremarkable.  Patient has been taking Plavix and aspirin for coronary artery disease which was prescribed to her by her cardiologist.  She continues to smoke although she is not smoking as much after she was discharged on 11/27/2022.    Her  states that she has been nervous since she got home.  She has racing thoughts.  She has problems sleeping at night.  She has not seen psychiatry.  She saw her primary care provider and she states that she wants to be referred to psychiatry since nothing was done regarding her anxiety.    Laboratory work-up including cholesterol 186 and LDL of 119.  She is presently on atorvastatin.  Hemoglobin A1c is normal.  Blood pressure according to her and fluctuates.  It usually is not over 150/90.    Objective   Vital Signs:   /78   Pulse 87   Ht 167.6 cm (65.98\")   Wt 69.4 kg (153 lb)   BMI 24.71 kg/m²     Physical Exam   Alert, fluent, phasic, follows commands well.  Visual fields full confrontation, EMs follow directions gaze, facial strength is full, soft palate elevation and tongue normal.  There is no weakness of the upper or lower extremities and with muscle testing.  There is no drift.  Reflexes are symmetrical in " biceps, triceps, patellar's and decrease in ankles.  Cerebellar testing is intact.  Station gait she is able to tiptoe, heel walk, vanita without difficulty.  Sensation symmetrical to pinprick.  There is no sensory loss or asymmetry.  Heart is regular rhythm normal in rate.        Assessment and Plan  Diagnoses and all orders for this visit:    1. TIA (transient ischemic attack) (Primary)  Assessment & Plan:  I discussed with them that she is to continue taking Plavix and aspirin as was given to her by her cardiologist.  Discussed with them that from a neurologic point of view she is limited to 3 weeks of dual antiplatelet therapy for 3 weeks however since she is taking this for cardiac reasons she can continue taking dual antiplatelet therapy.  She is to follow-up with her cardiologist.    I will repeat another MRI of the brain and she is to find out the results.    Orders:  -     MRI Brain Without Contrast; Future    2. Anxiety and depression  Assessment & Plan:  I will refer her to psychiatry at her request.    Orders:  -     Ambulatory Referral to Psychiatry       Total time spent with the patient and coordinating patient care was 35 minutes.    Follow Up  No follow-ups on file.  Patient was given instructions and counseling regarding her condition or for health maintenance advice. Please see specific information pulled into the AVS if appropriate.

## 2022-12-22 NOTE — ASSESSMENT & PLAN NOTE
I discussed with them that she is to continue taking Plavix and aspirin as was given to her by her cardiologist.  Discussed with them that from a neurologic point of view she is limited to 3 weeks of dual antiplatelet therapy for 3 weeks however since she is taking this for cardiac reasons she can continue taking dual antiplatelet therapy.  She is to follow-up with her cardiologist.    I will repeat another MRI of the brain and she is to find out the results.

## 2022-12-30 ENCOUNTER — PREP FOR SURGERY (OUTPATIENT)
Dept: OTHER | Facility: HOSPITAL | Age: 52
End: 2022-12-30
Payer: COMMERCIAL

## 2022-12-30 ENCOUNTER — CLINICAL SUPPORT (OUTPATIENT)
Dept: GASTROENTEROLOGY | Facility: CLINIC | Age: 52
End: 2022-12-30

## 2022-12-30 ENCOUNTER — TELEPHONE (OUTPATIENT)
Dept: GASTROENTEROLOGY | Facility: CLINIC | Age: 52
End: 2022-12-30

## 2022-12-30 DIAGNOSIS — K21.9 GASTROESOPHAGEAL REFLUX DISEASE WITHOUT ESOPHAGITIS: Primary | ICD-10-CM
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
## 2023-01-04 PROBLEM — K21.9 GASTROESOPHAGEAL REFLUX DISEASE WITHOUT ESOPHAGITIS: Status: ACTIVE | Noted: 2023-01-04

## 2023-01-06 ENCOUNTER — TELEPHONE (OUTPATIENT)
Dept: NEUROLOGY | Facility: CLINIC | Age: 53
End: 2023-01-06
Payer: COMMERCIAL

## 2023-01-06 NOTE — TELEPHONE ENCOUNTER
[facility] CALLED STATING PATIENT IS SCHEDULED FOR MRI Monday THEY NEED TO KNOW IF MRI IS NOT APPROVED BEFORE 5:30 APPOINTMENT IS IT OKAY TO RESCHEDULE PLEASE CALL [phone]

## 2023-01-06 NOTE — TELEPHONE ENCOUNTER
Spoke with Michelle, she stated they are keeping him on the schedule hoping to have auth by Monday, his appt is at 5:30 so if it is not back by early morning they will call and make him aware

## 2023-01-09 ENCOUNTER — HOSPITAL ENCOUNTER (OUTPATIENT)
Dept: MRI IMAGING | Facility: HOSPITAL | Age: 53
Discharge: HOME | End: 2023-01-09
Admitting: PSYCHIATRY & NEUROLOGY
Payer: COMMERCIAL

## 2023-01-09 DIAGNOSIS — G45.9 TIA (TRANSIENT ISCHEMIC ATTACK): ICD-10-CM

## 2023-01-09 PROCEDURE — 70551 MRI BRAIN STEM W/O DYE: CPT

## 2023-01-13 ENCOUNTER — HOSPITAL ENCOUNTER (OUTPATIENT)
Dept: INTERVENTIONAL RADIOLOGY/VASCULAR | Facility: HOSPITAL | Age: 53
Discharge: HOME | End: 2023-01-13
Payer: COMMERCIAL

## 2023-01-13 ENCOUNTER — HOSPITAL ENCOUNTER (OUTPATIENT)
Dept: MRI IMAGING | Facility: HOSPITAL | Age: 53
Discharge: HOME | End: 2023-01-13
Payer: COMMERCIAL

## 2023-01-13 DIAGNOSIS — M16.11 PRIMARY OSTEOARTHRITIS OF RIGHT HIP: ICD-10-CM

## 2023-01-13 DIAGNOSIS — S73.191A TEAR OF RIGHT ACETABULAR LABRUM, INITIAL ENCOUNTER: ICD-10-CM

## 2023-01-13 PROCEDURE — 0 GADOBENATE DIMEGLUMINE 529 MG/ML SOLUTION: Performed by: STUDENT IN AN ORGANIZED HEALTH CARE EDUCATION/TRAINING PROGRAM

## 2023-01-13 PROCEDURE — 77002 NEEDLE LOCALIZATION BY XRAY: CPT

## 2023-01-13 PROCEDURE — 73722 MRI JOINT OF LWR EXTR W/DYE: CPT

## 2023-01-13 PROCEDURE — A9577 INJ MULTIHANCE: HCPCS | Performed by: STUDENT IN AN ORGANIZED HEALTH CARE EDUCATION/TRAINING PROGRAM

## 2023-01-13 PROCEDURE — 25010000002 IOPAMIDOL 61 % SOLUTION: Performed by: STUDENT IN AN ORGANIZED HEALTH CARE EDUCATION/TRAINING PROGRAM

## 2023-01-13 RX ORDER — SODIUM CHLORIDE 9 MG/ML
10 INJECTION INTRAVENOUS AS NEEDED
Status: DISCONTINUED | OUTPATIENT
Start: 2023-01-13 | End: 2023-01-14 | Stop reason: HOSPADM

## 2023-01-13 RX ORDER — LIDOCAINE HYDROCHLORIDE 20 MG/ML
20 INJECTION, SOLUTION INFILTRATION; PERINEURAL ONCE
Status: COMPLETED | OUTPATIENT
Start: 2023-01-13 | End: 2023-01-13

## 2023-01-13 RX ADMIN — IOPAMIDOL 5 ML: 612 INJECTION, SOLUTION INTRAVENOUS at 10:41

## 2023-01-13 RX ADMIN — SODIUM BICARBONATE 1 ML: 84 INJECTION, SOLUTION INTRAVENOUS at 10:40

## 2023-01-13 RX ADMIN — LIDOCAINE HYDROCHLORIDE 9 ML: 20 INJECTION, SOLUTION INFILTRATION; PERINEURAL at 10:40

## 2023-01-13 RX ADMIN — GADOBENATE DIMEGLUMINE 0.1 ML: 529 INJECTION, SOLUTION INTRAVENOUS at 10:41

## 2023-01-16 ENCOUNTER — OFFICE VISIT (OUTPATIENT)
Dept: PSYCHIATRY | Facility: CLINIC | Age: 53
End: 2023-01-16
Payer: COMMERCIAL

## 2023-01-16 VITALS
HEIGHT: 66 IN | SYSTOLIC BLOOD PRESSURE: 131 MMHG | DIASTOLIC BLOOD PRESSURE: 70 MMHG | WEIGHT: 155.6 LBS | HEART RATE: 75 BPM | BODY MASS INDEX: 25.01 KG/M2

## 2023-01-16 DIAGNOSIS — F51.05 INSOMNIA DUE TO MENTAL DISORDER: ICD-10-CM

## 2023-01-16 DIAGNOSIS — F33.1 MAJOR DEPRESSIVE DISORDER, RECURRENT EPISODE, MODERATE: Primary | ICD-10-CM

## 2023-01-16 DIAGNOSIS — F41.1 GENERALIZED ANXIETY DISORDER: ICD-10-CM

## 2023-01-16 PROBLEM — I10 HYPERTENSION: Status: ACTIVE | Noted: 2018-02-27

## 2023-01-16 PROBLEM — I25.10 ARTERIOSCLEROSIS OF CORONARY ARTERY: Status: ACTIVE | Noted: 2018-02-27

## 2023-01-16 PROBLEM — R07.9 CHEST PAIN: Status: ACTIVE | Noted: 2018-11-19

## 2023-01-16 PROBLEM — Z95.5 STENTED CORONARY ARTERY: Status: ACTIVE | Noted: 2021-02-23

## 2023-01-16 PROBLEM — E78.00 HYPERCHOLESTEROLEMIA: Status: ACTIVE | Noted: 2018-02-27

## 2023-01-16 PROCEDURE — 90792 PSYCH DIAG EVAL W/MED SRVCS: CPT | Performed by: NURSE PRACTITIONER

## 2023-01-16 RX ORDER — DEXTROMETHORPHAN HYDROBROMIDE, BUPROPION HYDROCHLORIDE 105; 45 MG/1; MG/1
1 TABLET, MULTILAYER, EXTENDED RELEASE ORAL EVERY MORNING
Qty: 30 TABLET | Refills: 0 | Status: SHIPPED | OUTPATIENT
Start: 2023-01-16 | End: 2023-01-30

## 2023-01-16 RX ORDER — ACETAMINOPHEN 650 MG/1
TABLET, FILM COATED, EXTENDED RELEASE ORAL
COMMUNITY
Start: 2023-01-04

## 2023-01-16 RX ORDER — DEXTROMETHORPHAN HYDROBROMIDE, BUPROPION HYDROCHLORIDE 105; 45 MG/1; MG/1
1 TABLET, MULTILAYER, EXTENDED RELEASE ORAL EVERY MORNING
Qty: 30 TABLET | Refills: 0 | COMMUNITY
Start: 2023-01-16 | End: 2023-01-30

## 2023-01-16 RX ORDER — MULTIVIT-MIN/IRON/FOLIC ACID/K 18-600-40
CAPSULE ORAL
COMMUNITY
Start: 2023-01-04

## 2023-01-16 RX ORDER — CYCLOBENZAPRINE HCL 10 MG
TABLET ORAL
COMMUNITY
Start: 2023-01-04

## 2023-01-16 NOTE — PROGRESS NOTES
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 2. No hemodynamically significant stenosis of the intracranial vasculature.     Manolo Vigil M.D.       Electronically Signed and Approved By: Manolo Vigil M.D. on 11/26/2022 at 9:18             CT CEREBRAL PERFUSION WITH & WITHOUT CONTRAST    Result Date: 11/26/2022    1. Study within normal limits.      Manolo Vigil M.D.       Electronically Signed and Approved By: Manolo Vigil M.D. on 11/26/2022 at 7:57             [place] Contrast Injection CT / MRI    Result Date: 1/13/2023   Right hip Arthrogram was performed without complication, patient tolerated procedure.  The patient was instructed to obtain follow up care from the referring physician.   The above procedure was directly supervised by Dr. Vigil.   LINDSEY HUDSON       Electronically Signed and Approved By: Manolo Vigil M.D. on 1/13/2023 at 12:08                 Assessment & Plan   Diagnoses and all orders for this visit:    1. Major depressive disorder, recurrent episode, moderate (HCC) (Primary)  -     Dextromethorphan-buPROPion ER (Auvelity)  MG tablet controlled-release; Take 1 tablet by mouth Every Morning for 30 days.  Dispense: 30 tablet; Refill: 0  -     Ambulatory Referral to Psychotherapy  -     Dextromethorphan-buPROPion ER (Auvelity)  MG tablet controlled-release; Take 1 tablet by mouth Every Morning  for 30 days.  Dispense: 30 tablet; Refill: 0    2. Generalized anxiety disorder    3. Insomnia due to mental disorder      Patient presents with history of depression and anxiety. Will start auvelity to target depression and anxiety. Continue hydroxyzine as needed for anxiety. 20 minutes of supportive psychotherapy with goal to strengthen defenses, promote problems solving, restore adaptive functioning and provide symptom relief. The therapeutic alliance was strengthened to encourage the patient to express their thoughts and feelings. Esteem building was enhanced through praise, reassurance, normalizing and encouragement. Coping skills were enhanced to build distress tolerance skills and emotional regulation. Allowed patient to freely discuss issues without interruption or judgement with unconditional positive regard, active listening skills, and empathy. Provided a safe, confidential environment to facilitate the development of a positive therapeutic relationship and encourage open, honest communication. Assisted patient in identifying risk factors which would indicate the need for higher level of care including thoughts to harm self or others and/or self-harming behavior and encouraged patient to contact this office, call 911, or present to the nearest emergency room should any of these events occur. Assisted patient in processing session content; acknowledged and normalized patient's thoughts, feelings, and concerns by utilizing a person-centered approach in efforts to build appropriate rapport and a positive therapeutic relationship with open and honest communication. Patient given education on medication side effects, diagnosis/illness and relapse symptoms. Plan to continue supportive psychotherapy in next appointment to provide symptom relief. 4 weeks    Diagnoses: as above  Symptoms: as above  Functional status: good  Mental Status Exam: as above     Treatment plan: medication management and supportive  psychotherapy  Prognosis: good  Progress: initial visit    Visit Diagnoses:    ICD-10-CM ICD-9-CM   1. Major depressive disorder, recurrent episode, moderate (HCC)  F33.1 296.32   2. Generalized anxiety disorder  F41.1 300.02   3. Insomnia due to mental disorder  F51.05 300.9     327.02       PLAN:  1. Safety: No acute safety concerns.   2. Therapy: Trudi [place]  3. Risk Assessment: Risk of self-harm acutely is moderate.  Risk factors include anxiety disorder, mood disorder, and recent psychosocial stressors (pandemic). Protective factors include no family history, denies access to guns/weapons, no present SI, no history of suicide attempts or self-harm in the past, minimal AODA, healthcare seeking, future orientation, willingness to engage in care.  Risk of self-harm chronically is also moderate, but could be further elevated in the event of treatment noncompliance and/or AODA.  4. Medications: Start auvelity 45-105mg po qday to target depression and anxiety. Risks, benefits, alternatives discussed with patient including nausea, GI upset, increased energy, exacerbation of irritability, insomnia, lowering of seizure threshold.  After discussion of these risks and benefits, the patient voiced understanding and agreed to proceed. Continue hydroxyzine 25mg po qday prn anxiety. Risks, benefits, alternatives discussed with patient including sedation, dizziness, fall risk, GI upset, and risk of increased CNS depression and elevated heart rate if taken with other antihistamines.  After discussion of these risks and benefits, the patient voiced understanding and agreed to proceed.  5. Labs/studies: No labs/studies ordered at this time  6. Follow-up: 4 weeks    Patient screened positive for depression based on a PHQ-9 score of  on . Follow-up recommendations include: Suicide Risk Assessment performed.         TREATMENT PLAN/GOALS: Continue supportive psychotherapy efforts and medications as indicated. Treatment and  medication options discussed during today's visit. Patient ackowledged and verbally consented to continue with current treatment plan and was educated on the importance of compliance with treatment and follow-up appointments.      MEDICATION ISSUES:  OSIEL reviewed as expected.  Discussed medication options and treatment plan of prescribed medication as well as the risks, benefits, and side effects including potential falls, possible impaired driving and metabolic adversities among others. Patient is agreeable to call the office with any worsening of symptoms or onset of side effects. Patient is agreeable to call 911 or go to the nearest ER should he/she begin having SI/HI. No medication side effects or related complaints today.     MEDS ORDERED DURING VISIT:  New Medications Ordered This Visit   Medications   • Dextromethorphan-buPROPion ER (Auvelity)  MG tablet controlled-release     Sig: Take 1 tablet by mouth Every Morning for 30 days.     Dispense:  30 tablet     Refill:  0     Lot chwwd exp oct 2023   • Dextromethorphan-buPROPion ER (Auvelity)  MG tablet controlled-release     Sig: Take 1 tablet by mouth Every Morning for 30 days.     Dispense:  30 tablet     Refill:  0       Return in about 4 weeks (around 2/13/2023) for Next scheduled follow up.         This document has been electronically signed by ZAYNAB Sinclair  January 16, 2023 13:56 EST      Part of this note may be an electronic transcription/translation of spoken language to printed text using the Dragon Dictation System.

## 2023-01-16 NOTE — TREATMENT PLAN
Multi-Disciplinary Problems (from Behavioral Health Treatment Plan)    Active Problems     Problem: Anxiety  Start Date: 01/16/23    Problem Details: The patient self-scales this problem as a 5 with 10 being the worst.        Goal Priority Start Date Expected End Date End Date    Patient will develop and implement behavioral and cognitive strategies to reduce anxiety and irrational fears. -- 01/16/23 -- --    Goal Details: Progress toward goal:  Not appropriate to rate progress toward goal since this is the initial treatment plan.        Goal Intervention Frequency Start Date End Date    Help patient explore past emotional issues in relation to present anxiety. Weekly 01/16/23 --    Intervention Details: Duration of treatment until until remission of symptoms.        Goal Intervention Frequency Start Date End Date    Help patient develop an awareness of their cognitive and physical responses to anxiety. Weekly 01/16/23 --    Intervention Details: Duration of treatment until until remission of symptoms.              Problem: Depression  Start Date: 01/16/23    Problem Details: The patient self-scales this problem as a 5 with 10 being the worst.        Goal Priority Start Date Expected End Date End Date    Patient will demonstrate the ability to initiate new constructive life skills outside of sessions on a consistent basis. -- 01/16/23 -- --    Goal Details: Progress toward goal:  Not appropriate to rate progress toward goal since this is the initial treatment plan.        Goal Intervention Frequency Start Date End Date    Assist patient in setting attainable activities of daily living goals. PRN 01/16/23 --    Goal Intervention Frequency Start Date End Date    Provide education about depression Weekly 01/16/23 --    Intervention Details: Duration of treatment until until remission of symptoms.        Goal Intervention Frequency Start Date End Date    Assist patient in developing healthy coping strategies. Weekly  01/16/23 --    Intervention Details: Duration of treatment until until remission of symptoms.                    Reviewed By     Jalen Erickson APRN 01/16/23 [id number]                 I have discussed and reviewed this treatment plan with the patient.

## 2023-01-18 ENCOUNTER — OFFICE VISIT (OUTPATIENT)
Dept: ORTHOPEDIC SURGERY | Facility: CLINIC | Age: 53
End: 2023-01-18
Payer: COMMERCIAL

## 2023-01-18 VITALS — OXYGEN SATURATION: 96 % | WEIGHT: 159 LBS | HEIGHT: 66 IN | BODY MASS INDEX: 25.55 KG/M2

## 2023-01-18 DIAGNOSIS — M54.50 ACUTE LOW BACK PAIN, UNSPECIFIED BACK PAIN LATERALITY, UNSPECIFIED WHETHER SCIATICA PRESENT: ICD-10-CM

## 2023-01-18 DIAGNOSIS — M16.11 PRIMARY OSTEOARTHRITIS OF RIGHT HIP: Primary | ICD-10-CM

## 2023-01-18 PROCEDURE — 99213 OFFICE O/P EST LOW 20 MIN: CPT | Performed by: STUDENT IN AN ORGANIZED HEALTH CARE EDUCATION/TRAINING PROGRAM

## 2023-01-18 NOTE — PROGRESS NOTES
"Chief Complaint  Pain and Follow-up of the Right Hip    Subjective          Zoya Crook presents to [facility] for   History of Present Illness    The patient presents here today for follow up evaluation of the right hip. The patient has been treating her right hip osteoarthritis conservatively. She recently had an MRI Arthrogram and is here to discuss those results. To review, She reports hip pain for a couple years. She has had previous bursitis injections prior to COVID. She has groin pain, lateral hip pain and low back pain. She recently had a TIA. She has a catching feeling in the hip with sharp pains. She denies injury. She admits to tingling to the bottom of her foot.     Allergies   Allergen Reactions   • Acetaminophen Unknown - High Severity   • Naproxen Other (See Comments)     GI UPSET/ HX ULCER   • Ciprofloxacin Rash        Social History     Socioeconomic History   • Marital status:    Tobacco Use   • Smoking status: Every Day     Packs/day: 1.00     Years: 30.00     Pack years: 30.00     Types: Cigarettes   • Smokeless tobacco: Never   Vaping Use   • Vaping Use: Never used   Substance and Sexual Activity   • Alcohol use: Not Currently   • Drug use: Never   • Sexual activity: Yes     Partners: Male     Birth control/protection: Natural family planning/Rhythm        I reviewed the patient's chief complaint, history of present illness, review of systems, past medical history, surgical history, family history, social history, medications, and allergy list.     REVIEW OF SYSTEMS    Constitutional: Denies fevers, chills, weight loss  Cardiovascular: Denies chest pain, shortness of breath  Skin: Denies rashes, acute skin changes  Neurologic: Denies headache, loss of consciousness  MSK: Right hip pain      Objective   Vital Signs:   Ht 167.6 cm (66\")   Wt 72.1 kg (159 lb)   SpO2 96%   BMI 25.66 kg/m²     Body mass index is 25.66 kg/m².    Physical Exam    General: " Alert. No acute distress.   Right hip- antalgic gait. Non-tender to the bursa. Flexion 100 with pain. Positive impingement. Internal rotation 20. External Rotation 45. Positive EHL, FHL, GS and TA. Sensation intact to all 5 nerves of the foot. Positive pulses. Neurovascularly intact. Full knee extension flexion 120. No knee effusion or joint line pain. 4/5 hip flexion with pain. Tender to the lumbar spine    Procedures    Imaging Results (Most Recent)     None                   Assessment and Plan        MRI Brain Without Contrast    Result Date: 1/10/2023  Narrative: PROCEDURE: MRI BRAIN WO CONTRAST  COMPARISON: [facility], MR, MRI BRAIN WO CONTRAST, 11/26/2022, 14:19.  INDICATIONS: TIA      TECHNIQUE: A variety of imaging planes and parameters were utilized for visualization of suspected pathology.  Images were performed without contrast.  FINDINGS:  There are stable small foci of increased T2 and FLAIR signal in the bilateral subcortical white matter and periventricular white matter regions.  These findings are most consistent with chronic microvascular ischemia.  There is no mass or mass effect.  There are no abnormal extra-axial fluid collections.  There are no areas of acute hemorrhage.  The major intracranial flow voids are preserved.  The diffusion-weighted sequences are normal.      Impression:  Scattered foci of increased T2 and FLAIR signal consistent with chronic microvascular ischemia.  No significant change.  No acute intracranial abnormality.      EMILI WILSON MD       Electronically Signed and Approved By: EMILI WILSON MD on 1/10/2023 at 1:06             MRI Hip Right Arthrogram    Result Date: 1/13/2023  Narrative: PROCEDURE: MRI HIP RIGHT ARTHROGRAM  COMPARISON:  [facility], CR, XR PELVIS 1 OR 2 VW, 11/26/2022, 12:37. INDICATIONS: Chronic right hip pain,  impingement suspected, xray done.      TECHNIQUE: A comprehensive examination was performed utilizing a variety of  "imaging planes and imaging parameters to optimize visualization of suspected pathology. Images were obtained both before and after intravenous gadolinium infusion.   FINDINGS:  No fracture or malalignment is demonstrated.  Marrow signal appears normal.   Minimal bilateral hip osteoarthritic spurring is noted.  Dedicated images of the right hip were obtained following an arthrogram earlier today.  No labral tear is seen.  Cartilage in the joint is intact.  No loose body is seen.  No left hip joint effusion is seen.  The visualized tendons and musculature around the pelvis appear unremarkable.  Visualized pelvic viscera appear normal.      Impression:   1. Mild bilateral hip osteoarthritis 2. No internal derangement of the right hip joint     Manolo Vigil M.D.       Electronically Signed and Approved By: Manolo Vigil M.D. on 1/13/2023 at 14:21             FL Contrast Injection CT / MRI    Result Date: 1/13/2023  Narrative: PROCEDURE: FL CONTRAST INJECTION CT/MRI  COMPARISON: None  INDICATIONS: Right hip impingement. 1 IMAGE. FLUORO TIME 0.3 MINUTES. 2.5 mGy. ISOVUE M 300 - 4ML, SODIUM CHLORIDE 0.9% - 10ML, MULTIHANCE - 0.1 ML.  BRIEF HISTORY:  History of right hip pain described as stabbing.  CONSENT: Risks and benefits were discussed with the patient including but not limited to risk of bleeding, infection and/or nerve injury. Alternatives were discussed with the patient. The patient verbalized understanding and elected to proceed with the procedure.  FINDINGS:  Patient was placed in supine positioning on the fluoro table and the right hip was localized under fluoroscopy, site was marked with an \"X\".  The overlying skin was prepped and draped in sterile fashion.  The skin was infiltrated with local anesthesia over the insertion site with 6 mL of 2% lidocaine to anesthetize the site. A 22 gauge needle was then advanced to gain access into joint space.  Initially, A small amount of contrast dye injected to verify " correct needle placement.  Next, a 14 cc mixture containing dilute iodinated contrast was injected into the joint space (Isovue-M 300 4 mL, sodium chloride 0.9% 10 mL and MultiHance 0.1 mL).  Afterwards, the needle was then removed and a bandage was applied.  Limited spot fluoro images were obtained.  Please see post arthrogram MRI report same day for additional findings in detail.      Impression:  Right hip Arthrogram was performed without complication, patient tolerated procedure.  The patient was instructed to obtain follow up care from the referring physician.   The above procedure was directly supervised by Dr. Vigil.   LINDSEY HUDSON       Electronically Signed and Approved By: Manolo Vigil M.D. on 1/13/2023 at 12:08                Diagnoses and all orders for this visit:    1. Primary osteoarthritis of right hip (Primary)    2. Acute low back pain, unspecified back pain laterality, unspecified whether sciatica present        Discussed the treatment plan with the patient.  I reviewed the previous MRI with the patient. The patient has right hip osteoarthritis but she also has some tingling to the lower leg. Plan for MRI of the lumbar spine. The patinet expressed understanding and wished to proceed.        Educated on risk of smoking. Discussed options for smoking cessation. and Call or return if worsening symptoms.    Scribed for Pj Pryor MD by Ruth Marshall  01/18/2023   13:29 EST         Follow Up       MRI results    Patient was given instructions and counseling regarding her condition or for health maintenance advice. Please see specific information pulled into the AVS if appropriate.       I have personally performed the services described in this document as scribed by the above individual and it is both accurate and complete.     Pj Pryor MD  01/18/23  13:40 EST

## 2023-01-19 ENCOUNTER — OFFICE VISIT (OUTPATIENT)
Dept: PSYCHIATRY | Facility: CLINIC | Age: 53
End: 2023-01-19
Payer: COMMERCIAL

## 2023-01-19 ENCOUNTER — TELEPHONE (OUTPATIENT)
Dept: PSYCHIATRY | Facility: CLINIC | Age: 53
End: 2023-01-19
Payer: COMMERCIAL

## 2023-01-19 DIAGNOSIS — F32.A ANXIETY AND DEPRESSION: ICD-10-CM

## 2023-01-19 DIAGNOSIS — F41.9 ANXIETY AND DEPRESSION: ICD-10-CM

## 2023-01-19 PROCEDURE — 90837 PSYTX W PT 60 MINUTES: CPT | Performed by: SOCIAL WORKER
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
## 2023-01-20 NOTE — TELEPHONE ENCOUNTER
Prior authorization for Auvelity ER 45-105mg approved from 01/19/2023 to 01/18/2024.     BEHAVIORAL HEALTH - SCAN - AUVELITY PA APPROVAL, MEDIACT, 01/19/2023 (01/20/2023)

## 2023-01-30 ENCOUNTER — OFFICE VISIT (OUTPATIENT)
Dept: PSYCHIATRY | Facility: CLINIC | Age: 53
End: 2023-01-30
Payer: COMMERCIAL

## 2023-01-30 VITALS
HEART RATE: 92 BPM | WEIGHT: 156 LBS | SYSTOLIC BLOOD PRESSURE: 147 MMHG | HEIGHT: 65 IN | DIASTOLIC BLOOD PRESSURE: 77 MMHG | BODY MASS INDEX: 25.99 KG/M2

## 2023-01-30 DIAGNOSIS — F33.1 MAJOR DEPRESSIVE DISORDER, RECURRENT EPISODE, MODERATE: Primary | ICD-10-CM

## 2023-01-30 DIAGNOSIS — F41.1 GENERALIZED ANXIETY DISORDER: ICD-10-CM

## 2023-01-30 DIAGNOSIS — F51.05 INSOMNIA DUE TO MENTAL DISORDER: ICD-10-CM

## 2023-01-30 PROCEDURE — 99214 OFFICE O/P EST MOD 30 MIN: CPT | Performed by: NURSE PRACTITIONER

## 2023-01-30 PROCEDURE — 90833 PSYTX W PT W E/M 30 MIN: CPT | Performed by: NURSE PRACTITIONER

## 2023-01-30 RX ORDER — MONTELUKAST SODIUM 10 MG/1
1 TABLET ORAL DAILY
COMMUNITY
Start: 2022-11-02 | End: 2023-01-30

## 2023-01-30 RX ORDER — LEVOCETIRIZINE DIHYDROCHLORIDE 5 MG/1
1 TABLET, FILM COATED ORAL DAILY
COMMUNITY
Start: 2022-11-02 | End: 2023-01-30

## 2023-01-30 RX ORDER — MIRTAZAPINE 7.5 MG/1
7.5 TABLET, FILM COATED ORAL NIGHTLY
Qty: 30 TABLET | Refills: 0 | Status: SHIPPED | OUTPATIENT
Start: 2023-01-30 | End: 2023-02-27

## 2023-01-30 NOTE — PROGRESS NOTES
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 care from the referring physician.   The above procedure was directly supervised by Dr. Vigil.   LINDSEY HUDSON       Electronically Signed and Approved By: Manolo Vigil M.D. on 1/13/2023 at 12:08                 Assessment & Plan   Diagnoses and all orders for this visit:    1. Major depressive disorder, recurrent episode, moderate (HCC) (Primary)  -     mirtazapine (REMERON) 7.5 MG tablet; Take 1 tablet by mouth Every Night for 30 days.  Dispense: 30 tablet; Refill: 0    2. Generalized anxiety disorder    3. Insomnia due to mental disorder      Patient never started auvelity due to insurance issues. Start mirtazapine to target depression and anxiety. Continue hydroxyzine as needed for anxiety. 18 minutes of supportive psychotherapy with goal to strengthen defenses, promote problems solving, restore adaptive functioning and provide symptom relief. The therapeutic alliance was strengthened to encourage the patient to express their thoughts and feelings. Esteem building was enhanced through praise, reassurance, normalizing and encouragement. Coping skills were enhanced to build distress tolerance skills and emotional regulation. Allowed patient to freely discuss issues without interruption or judgement with unconditional positive regard, active listening skills, and empathy. Provided  a safe, confidential environment to facilitate the development of a positive therapeutic relationship and encourage open, honest communication. Assisted patient in identifying risk factors which would indicate the need for higher level of care including thoughts to harm self or others and/or self-harming behavior and encouraged patient to contact this office, call 911, or present to the nearest emergency room should any of these events occur. Assisted patient in processing session content; acknowledged and normalized patient's thoughts, feelings, and concerns by utilizing a person-centered approach in efforts to build appropriate rapport and a positive therapeutic relationship with open and honest communication. Patient given education on medication side effects, diagnosis/illness and relapse symptoms. Plan to continue supportive psychotherapy in next appointment to provide symptom relief. 4 weeks    Diagnoses: as above  Symptoms: as above  Functional status: good  Mental Status Exam: as above     Treatment plan: medication management and supportive psychotherapy  Prognosis: good  Progress: initial visit    Visit Diagnoses:    ICD-10-CM ICD-9-CM   1. Major depressive disorder, recurrent episode, moderate (HCC)  F33.1 296.32   2. Generalized anxiety disorder  F41.1 300.02   3. Insomnia due to mental disorder  F51.05 300.9     327.02       PLAN:  1. Safety: No acute safety concerns.   2. Therapy: Trudi Almeida  3. Risk Assessment: Risk of self-harm acutely is moderate.  Risk factors include anxiety disorder, mood disorder, and recent psychosocial stressors (pandemic). Protective factors include no family history, denies access to guns/weapons, no present SI, no history of suicide attempts or self-harm in the past, minimal AODA, healthcare seeking, future orientation, willingness to engage in care.  Risk of self-harm chronically is also moderate, but could be further elevated in the event of treatment noncompliance  and/or AODA.  4. Medications: Patient never took auvelity. Start mirtazapine 7.5mg po qhs to target depression and anxiety. Risks, benefits, alternatives discussed with patient including GI upset, sedation, dizziness with falls risk, increased appetite.  After discussion of these risks and benefits, the patient voiced understanding and agreed to proceed.Continue hydroxyzine 25mg po qday prn anxiety. Risks, benefits, alternatives discussed with patient including sedation, dizziness, fall risk, GI upset, and risk of increased CNS depression and elevated heart rate if taken with other antihistamines.  After discussion of these risks and benefits, the patient voiced understanding and agreed to proceed.  5. Labs/studies: No labs/studies ordered at this time  6. Follow-up: 4 weeks    Patient screened positive for depression based on a PHQ-9 score of  on . Follow-up recommendations include: Suicide Risk Assessment performed.         TREATMENT PLAN/GOALS: Continue supportive psychotherapy efforts and medications as indicated. Treatment and medication options discussed during today's visit. Patient ackowledged and verbally consented to continue with current treatment plan and was educated on the importance of compliance with treatment and follow-up appointments.      MEDICATION ISSUES:  OSIEL reviewed as expected.  Discussed medication options and treatment plan of prescribed medication as well as the risks, benefits, and side effects including potential falls, possible impaired driving and metabolic adversities among others. Patient is agreeable to call the office with any worsening of symptoms or onset of side effects. Patient is agreeable to call 911 or go to the nearest ER should he/she begin having SI/HI. No medication side effects or related complaints today.     MEDS ORDERED DURING VISIT:  New Medications Ordered This Visit   Medications   • mirtazapine (REMERON) 7.5 MG tablet     Sig: Take 1 tablet by mouth Every  Night for 30 days.     Dispense:  30 tablet     Refill:  0       Return in about 4 weeks (around 2/27/2023) for Next scheduled follow up.         This document has been electronically signed by ZAYNAB Sinclair  January 30, 2023 13:13 EST      Part of this note may be an electronic transcription/translation of spoken language to printed text using the Dragon Dictation System.

## 2023-02-09 ENCOUNTER — TELEPHONE (OUTPATIENT)
Dept: ORTHOPEDIC SURGERY | Facility: CLINIC | Age: 53
End: 2023-02-09
Payer: COMMERCIAL

## 2023-02-09 NOTE — TELEPHONE ENCOUNTER
HEIDI WONG FROM [facility] AUTHORIZATION DEPT CALLED TO INFORM US THAT THEY NEEDED TO RE-NICO PATIENTS MRI THAT WAS NICO ON 02-10-23 DUE TO HER INSURANCE NOT APPROVING IT AT THIS TIME. JUDITH STATED THEY WILL CALL THE PATIENT TO LET HER KNOW WHAT'S GOING ON AND WILL KEEP IN CONTACT WITH HER INSURANCE TO GET HER NICO ASAP

## 2023-02-22 ENCOUNTER — TELEPHONE (OUTPATIENT)
Dept: GASTROENTEROLOGY | Facility: CLINIC | Age: 53
End: 2023-02-22
Payer: COMMERCIAL

## 2023-02-22 NOTE — PROGRESS NOTES
Progress Note    Date: 02/23/2023  Time In: 3:00pm    Time Out: 3:53pm     Patient Name: Zoya Crook  Patient Age: 52 y.o.    CHIEF COMPLAINT: Depression and Anxiety *    Subjective   History of Present Illness           Zoya Crook is a 52 y.o. female who presents today as a follow-up for continued psychotherapy. Our last session was on 01/19/23 and goals at that time  Focused on staying in the present moment and recognizing negative thoughts.       Assessment    Mental Status Exam     Appearance: good hygiene and dressed appropriately for the weather  Behavior: calm  Cooperation:  engaged, cooperative, attentive, and friendly  Eye Contact:  good  Affect:  congruent  Mood: expressive  Speech: responsive  Thought Process:  organized and goal-oriented  Thought Content: appropriate  Suicidal: denies  Homicidal:  denies  Hallucinations:  denies  Memory:  intact  Orientation:  person, place, time, and situation  Reliability:  reliable  Insight:  good  Judgement:  good     Clinical Intervention       ICD-10-CM ICD-9-CM   1. Anxiety and depression  F41.9 300.00    F32.A 311        Individual psychotherapy was provided utilizing solution focused  techniques to promote problem-solving, encourage expression of thoughts and feelings, support self-esteem, increase acceptance, discuss values and strengths, manage stress, identify triggers, recognize patterns of behavior, acknowledge sources of feelings and behaviors, provide support and establish therapeutic alliance. **Zoya is unaccompanied today. She discussed family stressors. Her son is in detention and she has sat firm limits on her contact with him. Daughter lives in [facility] on their property and will be delievering a baby any day. She is very concerned about her, she has had substance abuse problems, has legal issues still to resolve. She is caring for her 8 yo dght, has custody of her for many years. Zoya is very protective of the grandaughter and will not allow her daughter  to negatively impact her with her living there. She reports that her depression and anxiety are better. She recognizes that her medication is effective, improvement in sleep has helped greatly. She is able to seek support from her brother, sometimes will leave the house and go there at times when she needs to talk. *     Plan   Plan & Goals     Continue to set firm limits in family relationships. Practice self care, keeping appointments and following up with providers. *    1. Patient acknowledged and verbally consented to continue working toward resolving current treatment plan goals and was educated on the importance of participation in the therapeutic process.  2. Patient will remain compliant with medication regimen as prescribed. Discuss any medication side effects, questions or concerns with prescribing provider.  3. Call 911 or present to the nearest emergency room in an emergency situation.  4. National Suicide Prevention Lifeline: Call 988. The Lifeline provides 24/7, free and confidential support for people in distress, prevention and crisis resources.    Return in about 3 weeks (around 3/16/2023).    ____________________  This document has been electronically signed by Trudi Almeida LCSW  February 23, 2023 15:58 EST    Part of this note may be an electronic transcription/translation of spoken language to printed text using the Dragon Dictation System.

## 2023-02-23 ENCOUNTER — OFFICE VISIT (OUTPATIENT)
Dept: PSYCHIATRY | Facility: CLINIC | Age: 53
End: 2023-02-23
Payer: COMMERCIAL

## 2023-02-23 DIAGNOSIS — F32.A ANXIETY AND DEPRESSION: ICD-10-CM

## 2023-02-23 DIAGNOSIS — F41.9 ANXIETY AND DEPRESSION: ICD-10-CM

## 2023-02-23 PROCEDURE — 90837 PSYTX W PT 60 MINUTES: CPT | Performed by: SOCIAL WORKER

## 2023-02-23 NOTE — PSYCHOTHERAPY NOTE
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

## 2023-02-26 DIAGNOSIS — F33.1 MAJOR DEPRESSIVE DISORDER, RECURRENT EPISODE, MODERATE: ICD-10-CM

## 2023-02-27 RX ORDER — MIRTAZAPINE 7.5 MG/1
TABLET, FILM COATED ORAL
Qty: 30 TABLET | Refills: 0 | Status: SHIPPED | OUTPATIENT
Start: 2023-02-27 | End: 2023-04-03

## 2023-02-27 NOTE — TELEPHONE ENCOUNTER
Atypical Antidepressants Protocol Passed 02/26/2023 03:44 AM    No active pregnancy on record    No positive pregnancy test in past 12 months    PHQ-2 or PHQ9 on record within past 12 months    Visit with authorizing provider in past 12 months or upcoming 30 days        NEXT APPT WITH PROVIDER  Appointment with Jalen Erickson APRN (02/28/2023)    LAST MED REFILL  mirtazapine (REMERON) 7.5 MG tablet (01/30/2023)    PROVIDER PLEASE ADVISE

## 2023-02-28 ENCOUNTER — OFFICE VISIT (OUTPATIENT)
Dept: PSYCHIATRY | Facility: CLINIC | Age: 53
End: 2023-02-28
Payer: COMMERCIAL

## 2023-02-28 VITALS
SYSTOLIC BLOOD PRESSURE: 124 MMHG | BODY MASS INDEX: 26.82 KG/M2 | WEIGHT: 161 LBS | DIASTOLIC BLOOD PRESSURE: 65 MMHG | HEIGHT: 65 IN

## 2023-02-28 DIAGNOSIS — F41.1 GENERALIZED ANXIETY DISORDER: ICD-10-CM

## 2023-02-28 DIAGNOSIS — F51.05 INSOMNIA DUE TO MENTAL DISORDER: ICD-10-CM

## 2023-02-28 DIAGNOSIS — F33.1 MAJOR DEPRESSIVE DISORDER, RECURRENT EPISODE, MODERATE: Primary | ICD-10-CM

## 2023-02-28 PROCEDURE — 99214 OFFICE O/P EST MOD 30 MIN: CPT | Performed by: NURSE PRACTITIONER

## 2023-02-28 PROCEDURE — 90833 PSYTX W PT W E/M 30 MIN: CPT | Performed by: NURSE PRACTITIONER

## 2023-02-28 NOTE — PROGRESS NOTES
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 Generalized anxiety disorder    3. Insomnia due to mental disorder      Continue mirtazapine to target depression and anxiety. Continue hydroxyzine as needed for anxiety. 17 minutes of supportive psychotherapy with goal to strengthen defenses, promote problems solving, restore adaptive functioning and provide symptom relief. The therapeutic alliance was strengthened to encourage the patient to express their thoughts and feelings. Esteem building was enhanced through praise, reassurance, normalizing and encouragement. Coping skills were enhanced to build distress tolerance skills and emotional regulation. Allowed patient to freely discuss issues without interruption or judgement with unconditional positive regard, active listening skills, and empathy. Provided a safe, confidential environment to facilitate the development of a positive therapeutic relationship and encourage open, honest communication. Assisted patient in identifying risk factors which would indicate the need for higher level of care including thoughts to harm self or others and/or self-harming behavior and encouraged patient to contact this office, call 911, or present to the nearest emergency room should any of these events occur. Assisted patient in processing session content; acknowledged and normalized patient's  thoughts, feelings, and concerns by utilizing a person-centered approach in efforts to build appropriate rapport and a positive therapeutic relationship with open and honest communication. Patient given education on medication side effects, diagnosis/illness and relapse symptoms. Plan to continue supportive psychotherapy in next appointment to provide symptom relief. 4 weeks    Diagnoses: as above  Symptoms: as above  Functional status: good  Mental Status Exam: as above     Treatment plan: medication management and supportive psychotherapy  Prognosis: good  Progress: continued improvement    Visit Diagnoses:    ICD-10-CM ICD-9-CM   1. Major depressive disorder, recurrent episode, moderate (HCC)  F33.1 296.32   2. Generalized anxiety disorder  F41.1 300.02   3. Insomnia due to mental disorder  F51.05 300.9     327.02       PLAN:  1. Safety: No acute safety concerns.   2. Therapy: Trudi Almeida  3. Risk Assessment: Risk of self-harm acutely is moderate.  Risk factors include anxiety disorder, mood disorder, and recent psychosocial stressors (pandemic). Protective factors include no family history, denies access to guns/weapons, no present SI, no history of suicide attempts or self-harm in the past, minimal AODA, healthcare seeking, future orientation, willingness to engage in care.  Risk of self-harm chronically is also moderate, but could be further elevated in the event of treatment noncompliance and/or AODA.  4. Medications: Continue mirtazapine 7.5mg po qhs to target depression and anxiety. Risks, benefits, alternatives discussed with patient including GI upset, sedation, dizziness with falls risk, increased appetite.  After discussion of these risks and benefits, the patient voiced understanding and agreed to proceed.Continue hydroxyzine 25mg po qday prn anxiety. Risks, benefits, alternatives discussed with patient including sedation, dizziness, fall risk, GI upset, and risk of increased CNS depression and  elevated heart rate if taken with other antihistamines.  After discussion of these risks and benefits, the patient voiced understanding and agreed to proceed.  5. Labs/studies: No labs/studies ordered at this time  6. Follow-up: 4 weeks    Patient screened positive for depression based on a PHQ-9 score of  on . Follow-up recommendations include: Suicide Risk Assessment performed.         TREATMENT PLAN/GOALS: Continue supportive psychotherapy efforts and medications as indicated. Treatment and medication options discussed during today's visit. Patient ackowledged and verbally consented to continue with current treatment plan and was educated on the importance of compliance with treatment and follow-up appointments.      MEDICATION ISSUES:  OSIEL reviewed as expected.  Discussed medication options and treatment plan of prescribed medication as well as the risks, benefits, and side effects including potential falls, possible impaired driving and metabolic adversities among others. Patient is agreeable to call the office with any worsening of symptoms or onset of side effects. Patient is agreeable to call 911 or go to the nearest ER should he/she begin having SI/HI. No medication side effects or related complaints today.     MEDS ORDERED DURING VISIT:  No orders of the defined types were placed in this encounter.      Return in about 4 weeks (around 3/28/2023) for Next scheduled follow up.         This document has been electronically signed by ZAYNAB Sinclair  February 28, 2023 13:02 EST      Part of this note may be an electronic transcription/translation of spoken language to printed text using the Dragon Dictation System.

## 2023-03-06 ENCOUNTER — TELEPHONE (OUTPATIENT)
Dept: GASTROENTEROLOGY | Facility: CLINIC | Age: 53
End: 2023-03-06
Payer: COMMERCIAL

## 2023-03-06 ENCOUNTER — HOSPITAL ENCOUNTER (OUTPATIENT)
Dept: MRI IMAGING | Facility: HOSPITAL | Age: 53
Discharge: HOME | End: 2023-03-06
Admitting: STUDENT IN AN ORGANIZED HEALTH CARE EDUCATION/TRAINING PROGRAM
Payer: COMMERCIAL

## 2023-03-06 DIAGNOSIS — M16.11 PRIMARY OSTEOARTHRITIS OF RIGHT HIP: ICD-10-CM

## 2023-03-06 PROCEDURE — 72148 MRI LUMBAR SPINE W/O DYE: CPT

## 2023-03-06 NOTE — TELEPHONE ENCOUNTER
Dr. Albarran advised that he will need to be out of the office on 03/24/2023.     Patient was on the schedule for an ENDO procedure this date. I spoke with patient and have rescheduled this appointment.       Procedure: EGD    New Appointment Date: 04/10/2023    Needs blood thinner-plavix clearance, per telephone encounter 2/22/2023 letter faxed to Dr Hughes

## 2023-03-20 ENCOUNTER — OFFICE VISIT (OUTPATIENT)
Dept: ORTHOPEDIC SURGERY | Facility: CLINIC | Age: 53
End: 2023-03-20
Payer: COMMERCIAL

## 2023-03-20 VITALS — BODY MASS INDEX: 27.32 KG/M2 | WEIGHT: 164 LBS | OXYGEN SATURATION: 98 % | HEART RATE: 76 BPM | HEIGHT: 65 IN

## 2023-03-20 DIAGNOSIS — M51.16 LUMBAR DISC DISEASE WITH RADICULOPATHY: ICD-10-CM

## 2023-03-20 DIAGNOSIS — M54.50 ACUTE LOW BACK PAIN, UNSPECIFIED BACK PAIN LATERALITY, UNSPECIFIED WHETHER SCIATICA PRESENT: Primary | ICD-10-CM

## 2023-03-20 PROCEDURE — 1160F RVW MEDS BY RX/DR IN RCRD: CPT | Performed by: STUDENT IN AN ORGANIZED HEALTH CARE EDUCATION/TRAINING PROGRAM

## 2023-03-20 PROCEDURE — 99213 OFFICE O/P EST LOW 20 MIN: CPT | Performed by: STUDENT IN AN ORGANIZED HEALTH CARE EDUCATION/TRAINING PROGRAM

## 2023-03-20 PROCEDURE — 1159F MED LIST DOCD IN RCRD: CPT | Performed by: STUDENT IN AN ORGANIZED HEALTH CARE EDUCATION/TRAINING PROGRAM

## 2023-03-20 RX ORDER — AMOXICILLIN 500 MG/1
1 CAPSULE ORAL 3 TIMES DAILY
COMMUNITY
Start: 2023-03-05

## 2023-03-20 RX ORDER — ASPIRIN 81 MG
TABLET,CHEWABLE ORAL
COMMUNITY
Start: 2023-03-06

## 2023-03-20 RX ORDER — PREDNISONE 20 MG/1
1 TABLET ORAL DAILY
COMMUNITY
Start: 2023-03-05

## 2023-03-20 RX ORDER — YOHIMBE BARK 500 MG
1 CAPSULE ORAL EVERY 12 HOURS SCHEDULED
COMMUNITY
Start: 2023-03-05

## 2023-03-20 NOTE — PROGRESS NOTES
"Chief Complaint  Pain and Follow-up of the Lumbar Spine    Subjective          Zoya Crook presents to [facility] for   History of Present Illness    The patient presents here today for follow up evaluation of the lumbar spine. The patient recently had an MRI and is here today for those results. To review, The patient has right hip osteoarthritis but she also has some tingling to the lower leg. She admits to tingling to the bottom of her foot.     Allergies   Allergen Reactions   • Acetaminophen Unknown - High Severity   • Naproxen Other (See Comments)     GI UPSET/ HX ULCER   • Ciprofloxacin Rash        Social History     Socioeconomic History   • Marital status:    Tobacco Use   • Smoking status: Every Day     Packs/day: 1.00     Years: 30.00     Pack years: 30.00     Types: Cigarettes   • Smokeless tobacco: Never   Vaping Use   • Vaping Use: Never used   Substance and Sexual Activity   • Alcohol use: Not Currently   • Drug use: Never   • Sexual activity: Yes     Partners: Male     Birth control/protection: Natural family planning/Rhythm        I reviewed the patient's chief complaint, history of present illness, review of systems, past medical history, surgical history, family history, social history, medications, and allergy list.     REVIEW OF SYSTEMS    Constitutional: Denies fevers, chills, weight loss  Cardiovascular: Denies chest pain, shortness of breath  Skin: Denies rashes, acute skin changes  Neurologic: Denies headache, loss of consciousness  MSK: low back pain      Objective   Vital Signs:   Pulse 76   Ht 165.1 cm (65\")   Wt 74.4 kg (164 lb)   SpO2 98%   BMI 27.29 kg/m²     Body mass index is 27.29 kg/m².    Physical Exam    General: Alert. No acute distress.   Right hip- antalgic gait. Non-tender to the bursa. Flexion 100 with pain. Positive impingement. Internal rotation 20. External Rotation 45. Positive EHL, FHL, GS and TA. Sensation intact to all 5 nerves " of the foot. Positive pulses. Neurovascularly intact. Full knee extension flexion 120. No knee effusion or joint line pain. 4/5 hip flexion with pain. Tender to the lumbar spine    Procedures    Imaging Results (Most Recent)     None                   Assessment and Plan        MRI Lumbar Spine Without Contrast    Result Date: 3/6/2023  Narrative: PROCEDURE: MRI LUMBAR SPINE WO CONTRAST  COMPARISON: None  INDICATIONS: CHRONIC LOWER BACK PAIN RADIATING INTO RIGHT LEG. NO KNOWN INJURY.  TECHNIQUE: A variety of imaging planes and parameters were utilized for visualization of suspected pathology.  FINDINGS:  PARASPINAL AREA: Normal with no visible mass.  BONES: No fracture, pars defect, or osseous lesion.  CORD/CAUDA EQUINA: Normal caliber, contour, and signal intensity. OTHER: There is a cyst of the right kidney.   LUMBAR DISC LEVELS: L1-L2: No significant disc/facet abnormality, spinal stenosis, or foraminal stenosis.  L2-L3: No significant disc/facet abnormality, spinal stenosis, or foraminal stenosis.  L3-L4: No significant disc/facet abnormality, spinal stenosis, or foraminal stenosis.  L4-L5: There is diffuse symmetric disc bulging with effacement of the ventral thecal sac.  Disc bulging combines with bony facet overgrowth and hypertrophy of the ligamentum flavum resulting in severe right and moderate to severe left neural foraminal narrowing. L5-S1: There is diffuse symmetric disc bulging with effacement of the ventral thecal sac.  Disc bulging combines with bony facet overgrowth and ligamentum flavum hypertrophy resulting in severe bilateral neural foraminal narrowing and moderate spinal canal stenosis.      Impression:  Degenerative disc disease at L4-5 and L5-S1.  At the L5-S1 level, disc bulging results in severe spinal canal stenosis and neural foraminal narrowing as detailed.   EMILI WILSON MD       Electronically Signed and Approved By: EMILI WILSON MD on 3/06/2023 at 15:42                Diagnoses and all  orders for this visit:    1. Acute low back pain, unspecified back pain laterality, unspecified whether sciatica present (Primary)    2. Lumbar disc disease with radiculopathy        Discussed the treatment plan with the patient.  I reviewed the MRI with the patient. Plan for a referral to Dr. Knox. The patient expressed understanding and wished to proceed.        Educated on risk of smoking. Discussed options for smoking cessation. and Call or return if worsening symptoms.    Scribed for Pj Pryor MD by Ruth Marshall  03/20/2023   09:44 EDT         Follow Up       PRN    Patient was given instructions and counseling regarding her condition or for health maintenance advice. Please see specific information pulled into the AVS if appropriate.       I have personally performed the services described in this document as scribed by the above individual and it is both accurate and complete.     Pj Pryor MD  03/20/23  09:56 EDT

## 2023-04-02 DIAGNOSIS — F33.1 MAJOR DEPRESSIVE DISORDER, RECURRENT EPISODE, MODERATE: ICD-10-CM

## 2023-04-03 ENCOUNTER — OFFICE VISIT (OUTPATIENT)
Dept: PSYCHIATRY | Facility: CLINIC | Age: 53
End: 2023-04-03
Payer: COMMERCIAL

## 2023-04-03 DIAGNOSIS — F41.9 ANXIETY AND DEPRESSION: ICD-10-CM

## 2023-04-03 DIAGNOSIS — F32.A ANXIETY AND DEPRESSION: ICD-10-CM

## 2023-04-03 PROCEDURE — 1160F RVW MEDS BY RX/DR IN RCRD: CPT | Performed by: SOCIAL WORKER

## 2023-04-03 PROCEDURE — 1159F MED LIST DOCD IN RCRD: CPT | Performed by: SOCIAL WORKER

## 2023-04-03 PROCEDURE — 90837 PSYTX W PT 60 MINUTES: CPT | Performed by: SOCIAL WORKER

## 2023-04-03 RX ORDER — MIRTAZAPINE 7.5 MG/1
TABLET, FILM COATED ORAL
Qty: 30 TABLET | Refills: 0 | Status: SHIPPED | OUTPATIENT
Start: 2023-04-03 | End: 2023-04-20 | Stop reason: SDUPTHER

## 2023-04-03 NOTE — TELEPHONE ENCOUNTER
Atypical Antidepressants Protocol Passed 04/02/2023 03:46 AM    No active pregnancy on record    No positive pregnancy test in past 12 months    PHQ-2 or PHQ9 on record within past 12 months    Visit with authorizing provider in past 12 months or upcoming 30 days     NEXT APPT WITH PROVIDER  Appointment with Jalen Erickson APRN (04/20/2023)    LAST MED REFILL  mirtazapine (REMERON) 7.5 MG tablet (02/27/2023)    PROVIDER PLEASE ADVISE

## 2023-04-06 NOTE — PRE-PROCEDURE INSTRUCTIONS
Arrival-time of 1300.    Must have a licensed  for transportation home post-procedure.    Nothing to eat or drink after midnight.    Confirmed she is holding Plavix, states last dose was on Sunday, April 2.     Hold all medications the morning of the procedure, instead bring all prescribed medications to the hospital.    Patient verbalized understanding of instructions.    Magaly Cummings RN

## 2023-04-07 ENCOUNTER — ANESTHESIA EVENT (OUTPATIENT)
Dept: GASTROENTEROLOGY | Facility: HOSPITAL | Age: 53
End: 2023-04-07
Payer: COMMERCIAL

## 2023-04-10 ENCOUNTER — OFFICE VISIT (OUTPATIENT)
Dept: NEUROSURGERY | Facility: CLINIC | Age: 53
End: 2023-04-10
Payer: COMMERCIAL

## 2023-04-10 ENCOUNTER — HOSPITAL ENCOUNTER (OUTPATIENT)
Facility: HOSPITAL | Age: 53
Setting detail: HOSPITAL OUTPATIENT SURGERY
Discharge: HOME | End: 2023-04-10
Attending: INTERNAL MEDICINE | Admitting: INTERNAL MEDICINE
Payer: COMMERCIAL

## 2023-04-10 ENCOUNTER — ANESTHESIA (OUTPATIENT)
Dept: GASTROENTEROLOGY | Facility: HOSPITAL | Age: 53
End: 2023-04-10
Payer: COMMERCIAL

## 2023-04-10 VITALS
BODY MASS INDEX: 26.94 KG/M2 | DIASTOLIC BLOOD PRESSURE: 76 MMHG | WEIGHT: 161.7 LBS | HEART RATE: 72 BPM | HEIGHT: 65 IN | SYSTOLIC BLOOD PRESSURE: 140 MMHG

## 2023-04-10 VITALS
OXYGEN SATURATION: 98 % | DIASTOLIC BLOOD PRESSURE: 71 MMHG | SYSTOLIC BLOOD PRESSURE: 118 MMHG | HEART RATE: 79 BPM | TEMPERATURE: 98.7 F | BODY MASS INDEX: 26.78 KG/M2 | RESPIRATION RATE: 16 BRPM | WEIGHT: 160.94 LBS

## 2023-04-10 DIAGNOSIS — M47.817 SPONDYLOSIS OF LUMBOSACRAL REGION WITHOUT MYELOPATHY OR RADICULOPATHY: Primary | ICD-10-CM

## 2023-04-10 DIAGNOSIS — K21.9 GASTROESOPHAGEAL REFLUX DISEASE WITHOUT ESOPHAGITIS: ICD-10-CM

## 2023-04-10 DIAGNOSIS — M46.1 INFLAMMATION OF RIGHT SACROILIAC JOINT: ICD-10-CM

## 2023-04-10 DIAGNOSIS — M70.61 TROCHANTERIC BURSITIS OF RIGHT HIP: ICD-10-CM

## 2023-04-10 PROCEDURE — 25010000002 PROPOFOL 10 MG/ML EMULSION: Performed by: NURSE ANESTHETIST, CERTIFIED REGISTERED

## 2023-04-10 PROCEDURE — 3078F DIAST BP <80 MM HG: CPT | Performed by: NURSE PRACTITIONER

## 2023-04-10 PROCEDURE — 3077F SYST BP >= 140 MM HG: CPT | Performed by: NURSE PRACTITIONER

## 2023-04-10 PROCEDURE — 43239 EGD BIOPSY SINGLE/MULTIPLE: CPT | Performed by: INTERNAL MEDICINE

## 2023-04-10 PROCEDURE — 99215 OFFICE O/P EST HI 40 MIN: CPT | Performed by: NURSE PRACTITIONER

## 2023-04-10 PROCEDURE — 88305 TISSUE EXAM BY PATHOLOGIST: CPT | Performed by: INTERNAL MEDICINE

## 2023-04-10 RX ORDER — DOCUSATE SODIUM 100 MG
CAPSULE ORAL
COMMUNITY
Start: 2023-03-31

## 2023-04-10 RX ORDER — PROPOFOL 10 MG/ML
VIAL (ML) INTRAVENOUS AS NEEDED
Status: DISCONTINUED | OUTPATIENT
Start: 2023-04-10 | End: 2023-04-10 | Stop reason: SURG

## 2023-04-10 RX ORDER — LIDOCAINE HYDROCHLORIDE 20 MG/ML
INJECTION, SOLUTION EPIDURAL; INFILTRATION; INTRACAUDAL; PERINEURAL AS NEEDED
Status: DISCONTINUED | OUTPATIENT
Start: 2023-04-10 | End: 2023-04-10 | Stop reason: SURG

## 2023-04-10 RX ORDER — SODIUM CHLORIDE, SODIUM LACTATE, POTASSIUM CHLORIDE, CALCIUM CHLORIDE 600; 310; 30; 20 MG/100ML; MG/100ML; MG/100ML; MG/100ML
30 INJECTION, SOLUTION INTRAVENOUS CONTINUOUS
Status: DISCONTINUED | OUTPATIENT
Start: 2023-04-10 | End: 2023-04-10 | Stop reason: HOSPADM

## 2023-04-10 RX ADMIN — PROPOFOL 150 MCG/KG/MIN: 10 INJECTION, EMULSION INTRAVENOUS at 15:30

## 2023-04-10 RX ADMIN — SODIUM CHLORIDE, POTASSIUM CHLORIDE, SODIUM LACTATE AND CALCIUM CHLORIDE 30 ML/HR: 600; 310; 30; 20 INJECTION, SOLUTION INTRAVENOUS at 13:33

## 2023-04-10 RX ADMIN — PROPOFOL 140 MG: 10 INJECTION, EMULSION INTRAVENOUS at 15:30

## 2023-04-10 RX ADMIN — LIDOCAINE HYDROCHLORIDE 100 MG: 20 INJECTION, SOLUTION EPIDURAL; INFILTRATION; INTRACAUDAL; PERINEURAL at 15:30

## 2023-04-10 NOTE — PROGRESS NOTES
"Chief Complaint  Back Pain (Low back pain)    Subjective          Zoya Crook who is a 52 y.o. year old female who presents to [facility] for evaluation of lumbar spine.      The patient complains of pain located in the lumbar spine.  Patients states the pain has been present for several years.  The pain came on gradually.  Pain is primarily on the right side into the hip. The pain scale level is 9.  The pain does not radiate into the leg.  The pain is constant and waxing/waning and described as sharp, aching and throbbing.  The pain is worse at no particular time of day. Patient states prolonged standing, prolonged walking, bending, twisting and lifting makes the pain worse.  Raising the legs to walk up stairs increases her pain. Patient states rest makes the pain better.    Associated Symptoms Include: Denies numbness and tingling  Conservative Interventions Include: Physical Therapy that was not very effective. She was first evaluated for her hip. She had an injection the hip which provided a short duration of relief. She has never had injections in the back. She is taking tylenol which does not help. Flexeril does not help.     Was this the result of an injury or accident?: No    History of Previous Spinal Surgery?: No    Nicotine use: smoker  (1 ppd)    BMI: Body mass index is 26.91 kg/m².      Review of Systems   Musculoskeletal: Positive for arthralgias, back pain, gait problem, myalgias and bursitis.   All other systems reviewed and are negative.       Objective   Vital Signs:   /76 (BP Location: Right arm, Patient Position: Sitting, Cuff Size: Adult)   Pulse 72   Ht 165.1 cm (65\")   Wt 73.3 kg (161 lb 11.2 oz)   BMI 26.91 kg/m²       Physical Exam  Vitals reviewed.   Constitutional:       Appearance: Normal appearance.   Musculoskeletal:      Lumbar back: Tenderness present. Negative right straight leg raise test and negative left straight leg raise " test.      Right hip: Tenderness present. Normal range of motion.      Left hip: No tenderness. Normal range of motion.   Neurological:      Mental Status: She is alert and oriented to person, place, and time.      Motor: Motor strength is normal.      Gait: Gait is intact.      Deep Tendon Reflexes:      Reflex Scores:       Patellar reflexes are 2+ on the right side and 2+ on the left side.       Achilles reflexes are 2+ on the right side and 2+ on the left side.       Neurologic Exam     Mental Status   Oriented to person, place, and time.   Level of consciousness: alert    Motor Exam   Muscle bulk: normal  Overall muscle tone: normal    Strength   Strength 5/5 throughout.     Sensory Exam   Light touch normal.     Gait, Coordination, and Reflexes     Gait  Gait: normal    Reflexes   Right patellar: 2+  Left patellar: 2+  Right achilles: 2+  Left achilles: 2+  Right ankle clonus: absent  Left ankle clonus: absent       Result Review :       Data reviewed: Radiologic studies MRI Lumbar Spine on 3/6/23 at [facility] personally reviewed. Degenerative changes in the lower lumbar spine, most significant at L4/5 and L5/S1. At L5/S1 there is moderate spinal canal and severe foraminal stenosis.           Assessment and Plan    Diagnoses and all orders for this visit:    1. Spondylosis of lumbosacral region without myelopathy or radiculopathy (Primary)  -     Ambulatory Referral to Pain Management    2. Inflammation of right sacroiliac joint  -     Ambulatory Referral to Pain Management    3. Trochanteric bursitis of right hip  -     Ambulatory Referral to Pain Management    Pt presenting for evaluation of low back and right hip pain. We reviewed her MRI Lumbar Spine. Her back pain is primarily axial in nature. She has pain at the SI joint and trochanteric bursa. Will refer to pain management for evaluation for right SI joint injection and lumbar MBB and RFA.     She is aware to call should she develop pain in to the legs.      Follow up as needed.    I spent 40 minutes caring for Zoya on this date of service. This time includes time spent by me in the following activities:preparing for the visit, reviewing tests, obtaining and/or reviewing a separately obtained history, performing a medically appropriate examination and/or evaluation , counseling and educating the patient/family/caregiver, referring and communicating with other health care professionals , documenting information in the medical record and independently interpreting results and communicating that information with the patient/family/caregiver.    Follow Up   Return if symptoms worsen or fail to improve.  Patient was given instructions and counseling regarding her condition or for health maintenance advice.     -Referral to pain management  -Follow up as needed
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
## 2023-04-10 NOTE — ANESTHESIA POSTPROCEDURE EVALUATION
Patient: Zoya Crook    Procedure Summary     Date: 04/10/23 Room / Location: [facility] ENDOSCOPY 2 / [facility] END[facility]    Anesthesia Start: 1528 Anesthesia Stop: 1547    Procedure: ESOPHAGOGASTRODUODENOSCOPY WITH BIOPSIES Diagnosis:       Gastroesophageal reflux disease without esophagitis      (Gastroesophageal reflux disease without esophagitis [K21.9])    Surgeons: Kris Albarran MD Provider: Naren Weber MD    Anesthesia Type: general ASA Status: 3          Anesthesia Type: general    Vitals  Vitals Value Taken Time   /71 04/10/23 1600   Temp 37.1 °C (98.7 °F) 04/10/23 1600   Pulse 74 04/10/23 1601   Resp 16 04/10/23 1600   SpO2 93 % 04/10/23 1601   Vitals shown include unvalidated device data.        Post Anesthesia Care and Evaluation    Patient location during evaluation: bedside  Patient participation: complete - patient participated  Level of consciousness: awake  Pain management: adequate    Airway patency: patent  PONV Status: none  Cardiovascular status: acceptable  Respiratory status: acceptable  Hydration status: acceptable    Comments: An Anesthesiologist personally participated in the most demanding procedures (including induction and emergence if applicable) in the anesthesia plan, monitored the course of anesthesia administration at frequent intervals and remained physically present and available for immediate diagnosis and treatment of emergencies.

## 2023-04-12 ENCOUNTER — PATIENT ROUNDING (BHMG ONLY) (OUTPATIENT)
Dept: NEUROSURGERY | Facility: CLINIC | Age: 53
End: 2023-04-12
Payer: COMMERCIAL

## 2023-04-12 ENCOUNTER — TELEPHONE (OUTPATIENT)
Dept: GASTROENTEROLOGY | Facility: CLINIC | Age: 53
End: 2023-04-12
Payer: COMMERCIAL

## 2023-04-12 LAB
CYTO UR: NORMAL
LAB AP CASE REPORT: NORMAL
LAB AP CLINICAL INFORMATION: NORMAL
PATH REPORT.FINAL DX SPEC: NORMAL
PATH REPORT.GROSS SPEC: NORMAL

## 2023-04-12 NOTE — TELEPHONE ENCOUNTER
----- Message from ZAYNAB Chopra sent at 4/12/2023  9:13 AM EDT -----  I have reviewed the patients upper endoscopy and pathology. Esophageal biopsies are normal. Stomach biopsies consistent with mild chronic inactive gastritis. Continue present medication Omeprazole 40mg daily. Biopsies are negative for H. Pylori, dysplasia, metaplasia, and malignancy.

## 2023-04-12 NOTE — TELEPHONE ENCOUNTER
Called pt. No Answer. Left message for pt to call back.    RE: EGD Results --- RESULT NOTES/In Basket

## 2023-04-20 ENCOUNTER — OFFICE VISIT (OUTPATIENT)
Dept: PSYCHIATRY | Facility: CLINIC | Age: 53
End: 2023-04-20
Payer: COMMERCIAL

## 2023-04-20 VITALS — HEIGHT: 65 IN | BODY MASS INDEX: 26.82 KG/M2 | WEIGHT: 161 LBS

## 2023-04-20 DIAGNOSIS — F51.05 INSOMNIA DUE TO MENTAL DISORDER: ICD-10-CM

## 2023-04-20 DIAGNOSIS — F33.1 MAJOR DEPRESSIVE DISORDER, RECURRENT EPISODE, MODERATE: ICD-10-CM

## 2023-04-20 DIAGNOSIS — F41.1 GENERALIZED ANXIETY DISORDER: Primary | ICD-10-CM

## 2023-04-20 PROBLEM — R21 RASH: Status: ACTIVE | Noted: 2023-04-20

## 2023-04-20 PROBLEM — R53.83 MALAISE AND FATIGUE: Status: ACTIVE | Noted: 2023-04-20

## 2023-04-20 PROBLEM — J01.90 ACUTE SINUSITIS: Status: ACTIVE | Noted: 2023-04-20

## 2023-04-20 PROBLEM — M54.2 NECK PAIN: Status: ACTIVE | Noted: 2023-04-20

## 2023-04-20 PROBLEM — R94.6 ABNORMAL FINDING ON THYROID FUNCTION TEST: Status: ACTIVE | Noted: 2023-04-20

## 2023-04-20 PROBLEM — M25.559 HIP PAIN: Status: ACTIVE | Noted: 2023-04-20

## 2023-04-20 PROBLEM — G47.00 INSOMNIA: Status: ACTIVE | Noted: 2023-04-20

## 2023-04-20 PROBLEM — L72.0 EPIDERMOID CYST OF SKIN: Status: ACTIVE | Noted: 2023-04-20

## 2023-04-20 PROBLEM — M25.50 MULTIPLE JOINT PAIN: Status: ACTIVE | Noted: 2023-04-20

## 2023-04-20 PROBLEM — J40 BRONCHITIS: Status: ACTIVE | Noted: 2023-04-20

## 2023-04-20 PROBLEM — M54.50 LOW BACK PAIN: Status: ACTIVE | Noted: 2023-04-20

## 2023-04-20 PROBLEM — R05.9 COUGH: Status: ACTIVE | Noted: 2023-04-20

## 2023-04-20 PROBLEM — H61.20 IMPACTED CERUMEN: Status: ACTIVE | Noted: 2023-04-20

## 2023-04-20 PROBLEM — E05.90 THYROTOXICOSIS: Status: ACTIVE | Noted: 2023-04-20

## 2023-04-20 PROBLEM — H66.90 OTITIS MEDIA: Status: ACTIVE | Noted: 2023-04-20

## 2023-04-20 PROBLEM — R31.29 MICROSCOPIC HEMATURIA: Status: ACTIVE | Noted: 2023-04-20

## 2023-04-20 PROBLEM — S33.5XXA LUMBAR SPRAIN: Status: ACTIVE | Noted: 2023-04-20

## 2023-04-20 PROBLEM — I83.90 VARICOSE VEINS OF LOWER EXTREMITY: Status: ACTIVE | Noted: 2023-04-20

## 2023-04-20 PROBLEM — M25.539 WRIST JOINT PAIN: Status: ACTIVE | Noted: 2023-04-20

## 2023-04-20 PROBLEM — G89.29 CHRONIC PAIN: Status: ACTIVE | Noted: 2023-04-20

## 2023-04-20 PROBLEM — R60.9 EDEMA: Status: ACTIVE | Noted: 2023-04-20

## 2023-04-20 PROBLEM — H60.399 INFECTIVE OTITIS EXTERNA: Status: ACTIVE | Noted: 2023-04-20

## 2023-04-20 PROBLEM — M21.619 BUNION: Status: ACTIVE | Noted: 2023-04-20

## 2023-04-20 PROBLEM — E66.3 OVERWEIGHT: Status: ACTIVE | Noted: 2023-04-20

## 2023-04-20 PROBLEM — F17.200 TOBACCO DEPENDENCE SYNDROME: Status: ACTIVE | Noted: 2023-04-20

## 2023-04-20 PROBLEM — M25.519 SHOULDER JOINT PAIN: Status: ACTIVE | Noted: 2023-04-20

## 2023-04-20 PROBLEM — J32.9 CHRONIC SINUSITIS: Status: ACTIVE | Noted: 2023-04-20

## 2023-04-20 PROBLEM — E53.9 VITAMIN B DEFICIENCY: Status: ACTIVE | Noted: 2023-04-20

## 2023-04-20 PROBLEM — R53.81 MALAISE AND FATIGUE: Status: ACTIVE | Noted: 2023-04-20

## 2023-04-20 PROBLEM — N39.0 URINARY TRACT INFECTIOUS DISEASE: Status: ACTIVE | Noted: 2023-04-20

## 2023-04-20 PROBLEM — N32.9 DISORDER OF BLADDER: Status: ACTIVE | Noted: 2023-04-20

## 2023-04-20 PROBLEM — J06.9 ACUTE UPPER RESPIRATORY INFECTION: Status: ACTIVE | Noted: 2023-04-20

## 2023-04-20 PROBLEM — M54.9 BACKACHE: Status: ACTIVE | Noted: 2023-04-20

## 2023-04-20 PROBLEM — R31.9 BLOOD IN URINE: Status: ACTIVE | Noted: 2023-04-20

## 2023-04-20 PROBLEM — R73.09 ABNORMAL GLUCOSE LEVEL: Status: ACTIVE | Noted: 2023-04-20

## 2023-04-20 PROBLEM — I10 BENIGN ESSENTIAL HYPERTENSION: Status: ACTIVE | Noted: 2023-04-20

## 2023-04-20 PROBLEM — I25.2 OLD MYOCARDIAL INFARCTION: Status: ACTIVE | Noted: 2023-04-20

## 2023-04-20 PROBLEM — K21.00 GASTRO-ESOPHAGEAL REFLUX DISEASE WITH ESOPHAGITIS: Status: ACTIVE | Noted: 2023-04-20

## 2023-04-20 RX ORDER — FLUTICASONE PROPIONATE 50 MCG
SPRAY, SUSPENSION (ML) NASAL
COMMUNITY

## 2023-04-20 RX ORDER — ONDANSETRON 4 MG/1
TABLET, ORALLY DISINTEGRATING ORAL
COMMUNITY

## 2023-04-20 RX ORDER — MIRTAZAPINE 7.5 MG/1
7.5 TABLET, FILM COATED ORAL NIGHTLY
Qty: 30 TABLET | Refills: 2 | Status: SHIPPED | OUTPATIENT
Start: 2023-04-30

## 2023-04-20 RX ORDER — AMLODIPINE BESYLATE 10 MG/1
TABLET ORAL EVERY 24 HOURS
COMMUNITY

## 2023-04-20 NOTE — PROGRESS NOTES
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 ischemia.  No significant change.  No acute intracranial abnormality.      EMILI WILSON MD       Electronically Signed and Approved By: EMILI WILSON MD on 1/10/2023 at 1:06             MRI Brain Without Contrast    Result Date: 11/26/2022    1. No acute infarction or intracranial hemorrhage 2. Mild signal abnormality in the cerebral white matter , nonspecific in appearance but most likely representing small vessel ischemic disease in a patient of this age. 3. Prominent mucosal inflammatory changes in the bilateral ethmoid and right maxillary sinuses.      Manolo Vigil M.D.       Electronically Signed and Approved By: Manolo Vigil M.D. on 11/26/2022 at 14:59             XR Chest 1 View    Result Date: 11/26/2022    1. No acute cardiopulmonary disease       Manolo Vigil M.D.       Electronically Signed and Approved By: Manolo Vigil M.D. on 11/26/2022 at 8:32             XR Pelvis 1 or 2 View    Result Date: 11/26/2022    1. No acute finding 2. Mild bilateral hip osteoarthritis      Manolo Vigil M.D.       Electronically Signed and Approved By: Manolo Vigil M.D. on 11/26/2022 at 13:31             MRI Hip Right Arthrogram    Result Date: 1/13/2023    1. Mild bilateral hip osteoarthritis 2. No internal derangement of the right hip joint     Manolo Vigil M.D.       Electronically Signed and Approved By:  Manolo Vigil M.D. on 1/13/2023 at 14:21             CT Head Without Contrast Stroke Protocol    Result Date: 11/26/2022    1. Mild small vessel ischemic changes in the white matter     Manolo Vigil M.D.       Electronically Signed and Approved By: Manolo Vigil M.D. on 11/26/2022 at 7:49             CT Angiogram Head w AI Analysis of LVO    Result Date: 11/26/2022    1. Relatively mild atherosclerotic calcification involving the intracranial segments of both internal carotid arteries. 2. No hemodynamically significant stenosis of the intracranial vasculature.     Manolo Vigil M.D.       Electronically Signed and Approved By: Manolo Vigil M.D. on 11/26/2022 at 9:18             CT CEREBRAL PERFUSION WITH & WITHOUT CONTRAST    Result Date: 11/26/2022    1. Study within normal limits.      Manolo Vigil M.D.       Electronically Signed and Approved By: Manolo Vigil M.D. on 11/26/2022 at 7:57             FL Contrast Injection CT / MRI    Result Date: 1/13/2023   Right hip Arthrogram was performed without complication, patient tolerated procedure.  The patient was instructed to obtain follow up care from the referring physician.   The above procedure was directly supervised by Dr. Vigil.   LINDSEY HUDSON       Electronically Signed and Approved By: Manolo Vigil M.D. on 1/13/2023 at 12:08                 Assessment & Plan   Diagnoses and all orders for this visit:    1. Generalized anxiety disorder (Primary)    2. Major depressive disorder, recurrent episode, moderate  -     mirtazapine (REMERON) 7.5 MG tablet; Take 1 tablet by mouth Every Night.  Dispense: 30 tablet; Refill: 2    3. Insomnia due to mental disorder      Continue mirtazapine to target depression and anxiety. Continue hydroxyzine as needed for anxiety. 16 minutes of supportive psychotherapy with goal to strengthen defenses, promote problems solving, restore adaptive functioning and provide symptom relief. The therapeutic alliance was strengthened to  encourage the patient to express their thoughts and feelings. Esteem building was enhanced through praise, reassurance, normalizing and encouragement. Coping skills were enhanced to build distress tolerance skills and emotional regulation. Allowed patient to freely discuss issues without interruption or judgement with unconditional positive regard, active listening skills, and empathy. Provided a safe, confidential environment to facilitate the development of a positive therapeutic relationship and encourage open, honest communication. Assisted patient in identifying risk factors which would indicate the need for higher level of care including thoughts to harm self or others and/or self-harming behavior and encouraged patient to contact this office, call 911, or present to the nearest emergency room should any of these events occur. Assisted patient in processing session content; acknowledged and normalized patient's thoughts, feelings, and concerns by utilizing a person-centered approach in efforts to build appropriate rapport and a positive therapeutic relationship with open and honest communication. Patient given education on medication side effects, diagnosis/illness and relapse symptoms. Plan to continue supportive psychotherapy in next appointment to provide symptom relief. 4 weeks    Diagnoses: as above  Symptoms: as above  Functional status: good  Mental Status Exam: as above     Treatment plan: medication management and supportive psychotherapy  Prognosis: good  Progress: continued improvement    Visit Diagnoses:    ICD-10-CM ICD-9-CM   1. Generalized anxiety disorder  F41.1 300.02   2. Major depressive disorder, recurrent episode, moderate  F33.1 296.32   3. Insomnia due to mental disorder  F51.05 300.9     327.02       PLAN:  11. Safety: No acute safety concerns.   12. Therapy: Trudi Almeida  13. Risk Assessment: Risk of self-harm acutely is moderate.  Risk factors include anxiety disorder, mood  disorder, and recent psychosocial stressors (pandemic). Protective factors include no family history, denies access to guns/weapons, no present SI, no history of suicide attempts or self-harm in the past, minimal AODA, healthcare seeking, future orientation, willingness to engage in care.  Risk of self-harm chronically is also moderate, but could be further elevated in the event of treatment noncompliance and/or AODA.  14. Medications: Continue mirtazapine 7.5mg po qhs to target depression and anxiety. Risks, benefits, alternatives discussed with patient including GI upset, sedation, dizziness with falls risk, increased appetite.  After discussion of these risks and benefits, the patient voiced understanding and agreed to proceed.Continue hydroxyzine 25mg po qday prn anxiety. Risks, benefits, alternatives discussed with patient including sedation, dizziness, fall risk, GI upset, and risk of increased CNS depression and elevated heart rate if taken with other antihistamines.  After discussion of these risks and benefits, the patient voiced understanding and agreed to proceed.  15. Labs/studies: No labs/studies ordered at this time  16. Follow-up: 4 weeks    Patient screened positive for depression based on a PHQ-9 score of  on . Follow-up recommendations include: Suicide Risk Assessment performed.         TREATMENT PLAN/GOALS: Continue supportive psychotherapy efforts and medications as indicated. Treatment and medication options discussed during today's visit. Patient ackowledged and verbally consented to continue with current treatment plan and was educated on the importance of compliance with treatment and follow-up appointments.      MEDICATION ISSUES:  OSIEL reviewed as expected.  Discussed medication options and treatment plan of prescribed medication as well as the risks, benefits, and side effects including potential falls, possible impaired driving and metabolic adversities among others. Patient is  agreeable to call the office with any worsening of symptoms or onset of side effects. Patient is agreeable to call 911 or go to the nearest ER should he/she begin having SI/HI. No medication side effects or related complaints today.     MEDS ORDERED DURING VISIT:  New Medications Ordered This Visit   Medications   • mirtazapine (REMERON) 7.5 MG tablet     Sig: Take 1 tablet by mouth Every Night.     Dispense:  30 tablet     Refill:  2       Return in about 4 weeks (around 5/18/2023) for Next scheduled follow up.         This document has been electronically signed by ZAYNAB Sinclair  April 20, 2023 09:59 EDT      Part of this note may be an electronic transcription/translation of spoken language to printed text using the Dragon Dictation System.

## 2023-04-28 ENCOUNTER — TRANSCRIBE ORDERS (OUTPATIENT)
Dept: LAB | Facility: HOSPITAL | Age: 53
End: 2023-04-28
Payer: COMMERCIAL

## 2023-04-28 DIAGNOSIS — I10 HYPERTENSION, ESSENTIAL: ICD-10-CM

## 2023-04-28 DIAGNOSIS — E03.8 OTHER SPECIFIED HYPOTHYROIDISM: Primary | ICD-10-CM

## 2023-05-01 ENCOUNTER — TRANSCRIBE ORDERS (OUTPATIENT)
Dept: ADMINISTRATIVE | Facility: HOSPITAL | Age: 53
End: 2023-05-01
Payer: COMMERCIAL

## 2023-05-01 ENCOUNTER — LAB (OUTPATIENT)
Dept: LAB | Facility: HOSPITAL | Age: 53
End: 2023-05-01
Payer: COMMERCIAL

## 2023-05-01 DIAGNOSIS — E03.8 OTHER SPECIFIED HYPOTHYROIDISM: ICD-10-CM

## 2023-05-01 DIAGNOSIS — E78.5 HYPERLIPIDEMIA, UNSPECIFIED HYPERLIPIDEMIA TYPE: ICD-10-CM

## 2023-05-01 DIAGNOSIS — I10 ESSENTIAL HYPERTENSION, MALIGNANT: ICD-10-CM

## 2023-05-01 DIAGNOSIS — I10 HYPERTENSION, ESSENTIAL: ICD-10-CM

## 2023-05-01 DIAGNOSIS — E03.8 SECONDARY HYPOTHYROIDISM: ICD-10-CM

## 2023-05-01 DIAGNOSIS — I10 ESSENTIAL HYPERTENSION, MALIGNANT: Primary | ICD-10-CM

## 2023-05-01 LAB
ALBUMIN SERPL-MCNC: 4.3 G/DL (ref 3.5–5.2)
ALBUMIN/GLOB SERPL: 1.2 G/DL
ALP SERPL-CCNC: 94 U/L (ref 39–117)
ALT SERPL W P-5'-P-CCNC: 20 U/L (ref 1–33)
ANION GAP SERPL CALCULATED.3IONS-SCNC: 6 MMOL/L (ref 5–15)
AST SERPL-CCNC: 20 U/L (ref 1–32)
BASOPHILS # BLD AUTO: 0.1 10*3/MM3 (ref 0–0.2)
BASOPHILS NFR BLD AUTO: 0.9 % (ref 0–1.5)
BILIRUB SERPL-MCNC: 0.2 MG/DL (ref 0–1.2)
BUN SERPL-MCNC: 9 MG/DL (ref 6–20)
BUN/CREAT SERPL: 12.2 (ref 7–25)
CALCIUM SPEC-SCNC: 9.3 MG/DL (ref 8.6–10.5)
CHLORIDE SERPL-SCNC: 107 MMOL/L (ref 98–107)
CHOLEST SERPL-MCNC: 175 MG/DL (ref 0–200)
CO2 SERPL-SCNC: 28 MMOL/L (ref 22–29)
CREAT SERPL-MCNC: 0.74 MG/DL (ref 0.57–1)
DEPRECATED RDW RBC AUTO: 38.7 FL (ref 37–54)
EGFRCR SERPLBLD CKD-EPI 2021: 96.3 ML/MIN/1.73
EOSINOPHIL # BLD AUTO: 0.37 10*3/MM3 (ref 0–0.4)
EOSINOPHIL NFR BLD AUTO: 3.2 % (ref 0.3–6.2)
ERYTHROCYTE [DISTWIDTH] IN BLOOD BY AUTOMATED COUNT: 12.9 % (ref 12.3–15.4)
GLOBULIN UR ELPH-MCNC: 3.5 GM/DL
GLUCOSE SERPL-MCNC: 109 MG/DL (ref 65–99)
HCT VFR BLD AUTO: 41.3 % (ref 34–46.6)
HDLC SERPL-MCNC: 42 MG/DL (ref 40–60)
HGB BLD-MCNC: 13.9 G/DL (ref 12–15.9)
LDLC SERPL CALC-MCNC: 110 MG/DL (ref 0–100)
LDLC/HDLC SERPL: 2.57 {RATIO}
LYMPHOCYTES # BLD AUTO: 3.08 10*3/MM3 (ref 0.7–3.1)
LYMPHOCYTES NFR BLD AUTO: 26.9 % (ref 19.6–45.3)
MCH RBC QN AUTO: 27.9 PG (ref 26.6–33)
MCHC RBC AUTO-ENTMCNC: 33.7 G/DL (ref 31.5–35.7)
MCV RBC AUTO: 82.9 FL (ref 79–97)
MONOCYTES # BLD AUTO: 0.74 10*3/MM3 (ref 0.1–0.9)
MONOCYTES NFR BLD AUTO: 6.5 % (ref 5–12)
NEUTROPHILS NFR BLD AUTO: 62.2 % (ref 42.7–76)
NEUTROPHILS NFR BLD AUTO: 7.12 10*3/MM3 (ref 1.7–7)
PLATELET # BLD AUTO: 189 10*3/MM3 (ref 140–450)
PMV BLD AUTO: 13.2 FL (ref 6–12)
POTASSIUM SERPL-SCNC: 5.2 MMOL/L (ref 3.5–5.2)
PROT SERPL-MCNC: 7.8 G/DL (ref 6–8.5)
RBC # BLD AUTO: 4.98 10*6/MM3 (ref 3.77–5.28)
SODIUM SERPL-SCNC: 141 MMOL/L (ref 136–145)
T3FREE SERPL-MCNC: 2.49 PG/ML (ref 2–4.4)
T4 FREE SERPL-MCNC: 0.79 NG/DL (ref 0.93–1.7)
TRIGL SERPL-MCNC: 126 MG/DL (ref 0–150)
TSH SERPL DL<=0.05 MIU/L-ACNC: 1.07 UIU/ML (ref 0.27–4.2)
VLDLC SERPL-MCNC: 23 MG/DL (ref 5–40)
WBC NRBC COR # BLD: 11.44 10*3/MM3 (ref 3.4–10.8)

## 2023-05-01 PROCEDURE — 80053 COMPREHEN METABOLIC PANEL: CPT

## 2023-05-01 PROCEDURE — 36415 COLL VENOUS BLD VENIPUNCTURE: CPT

## 2023-05-01 PROCEDURE — 84443 ASSAY THYROID STIM HORMONE: CPT

## 2023-05-01 PROCEDURE — 85025 COMPLETE CBC W/AUTO DIFF WBC: CPT

## 2023-05-01 PROCEDURE — 84481 FREE ASSAY (FT-3): CPT

## 2023-05-01 PROCEDURE — 84439 ASSAY OF FREE THYROXINE: CPT

## 2023-05-01 PROCEDURE — 80061 LIPID PANEL: CPT

## 2023-05-06 ENCOUNTER — HOSPITAL ENCOUNTER (EMERGENCY)
Facility: HOSPITAL | Age: 53
Discharge: HOME | End: 2023-05-06
Attending: EMERGENCY MEDICINE
Payer: COMMERCIAL

## 2023-05-06 VITALS
SYSTOLIC BLOOD PRESSURE: 139 MMHG | DIASTOLIC BLOOD PRESSURE: 80 MMHG | RESPIRATION RATE: 16 BRPM | HEART RATE: 72 BPM | WEIGHT: 160.5 LBS | BODY MASS INDEX: 26.74 KG/M2 | HEIGHT: 65 IN | TEMPERATURE: 98 F | OXYGEN SATURATION: 94 %

## 2023-05-06 DIAGNOSIS — R04.0 EPISTAXIS: Primary | ICD-10-CM

## 2023-05-06 LAB
ALBUMIN SERPL-MCNC: 4.2 G/DL (ref 3.5–5.2)
ALBUMIN/GLOB SERPL: 1.2 G/DL
ALP SERPL-CCNC: 102 U/L (ref 39–117)
ALT SERPL W P-5'-P-CCNC: 18 U/L (ref 1–33)
ANION GAP SERPL CALCULATED.3IONS-SCNC: 10 MMOL/L (ref 5–15)
AST SERPL-CCNC: 21 U/L (ref 1–32)
BASOPHILS # BLD AUTO: 0.11 10*3/MM3 (ref 0–0.2)
BASOPHILS NFR BLD AUTO: 0.8 % (ref 0–1.5)
BILIRUB SERPL-MCNC: 0.2 MG/DL (ref 0–1.2)
BUN SERPL-MCNC: 9 MG/DL (ref 6–20)
BUN/CREAT SERPL: 13.6 (ref 7–25)
CALCIUM SPEC-SCNC: 9.7 MG/DL (ref 8.6–10.5)
CHLORIDE SERPL-SCNC: 102 MMOL/L (ref 98–107)
CO2 SERPL-SCNC: 25 MMOL/L (ref 22–29)
CREAT SERPL-MCNC: 0.66 MG/DL (ref 0.57–1)
DEPRECATED RDW RBC AUTO: 43.4 FL (ref 37–54)
EGFRCR SERPLBLD CKD-EPI 2021: 104.4 ML/MIN/1.73
EOSINOPHIL # BLD AUTO: 0.45 10*3/MM3 (ref 0–0.4)
EOSINOPHIL NFR BLD AUTO: 3.4 % (ref 0.3–6.2)
ERYTHROCYTE [DISTWIDTH] IN BLOOD BY AUTOMATED COUNT: 14.1 % (ref 12.3–15.4)
GLOBULIN UR ELPH-MCNC: 3.6 GM/DL
GLUCOSE SERPL-MCNC: 96 MG/DL (ref 65–99)
HCT VFR BLD AUTO: 43.3 % (ref 34–46.6)
HGB BLD-MCNC: 14.3 G/DL (ref 12–15.9)
IMM GRANULOCYTES # BLD AUTO: 0.02 10*3/MM3 (ref 0–0.05)
IMM GRANULOCYTES NFR BLD AUTO: 0.2 % (ref 0–0.5)
INR PPP: 0.99 (ref 0.86–1.15)
LYMPHOCYTES # BLD AUTO: 4.12 10*3/MM3 (ref 0.7–3.1)
LYMPHOCYTES NFR BLD AUTO: 31.2 % (ref 19.6–45.3)
MCH RBC QN AUTO: 27.7 PG (ref 26.6–33)
MCHC RBC AUTO-ENTMCNC: 33 G/DL (ref 31.5–35.7)
MCV RBC AUTO: 83.9 FL (ref 79–97)
MONOCYTES # BLD AUTO: 1.11 10*3/MM3 (ref 0.1–0.9)
MONOCYTES NFR BLD AUTO: 8.4 % (ref 5–12)
NEUTROPHILS NFR BLD AUTO: 56 % (ref 42.7–76)
NEUTROPHILS NFR BLD AUTO: 7.39 10*3/MM3 (ref 1.7–7)
NRBC BLD AUTO-RTO: 0 /100 WBC (ref 0–0.2)
PLATELET # BLD AUTO: 185 10*3/MM3 (ref 140–450)
PMV BLD AUTO: 12.4 FL (ref 6–12)
POTASSIUM SERPL-SCNC: 3.9 MMOL/L (ref 3.5–5.2)
PROT SERPL-MCNC: 7.8 G/DL (ref 6–8.5)
PROTHROMBIN TIME: 13.2 SECONDS (ref 11.8–14.9)
RBC # BLD AUTO: 5.16 10*6/MM3 (ref 3.77–5.28)
SODIUM SERPL-SCNC: 137 MMOL/L (ref 136–145)
WBC NRBC COR # BLD: 13.2 10*3/MM3 (ref 3.4–10.8)

## 2023-05-06 PROCEDURE — 85610 PROTHROMBIN TIME: CPT

## 2023-05-06 PROCEDURE — 99282 EMERGENCY DEPT VISIT SF MDM: CPT

## 2023-05-06 PROCEDURE — 80053 COMPREHEN METABOLIC PANEL: CPT

## 2023-05-06 PROCEDURE — 85025 COMPLETE CBC W/AUTO DIFF WBC: CPT

## 2023-05-06 PROCEDURE — 99283 EMERGENCY DEPT VISIT LOW MDM: CPT

## 2023-05-06 PROCEDURE — 36415 COLL VENOUS BLD VENIPUNCTURE: CPT
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
## 2023-05-06 NOTE — DISCHARGE INSTRUCTIONS
Avoid rubbing, blowing, or picking of your nose.  Obtain a saline nasal spray from your local pharmacy.  Use this every couple hours while awake to keep the nose moist.  If you have recurrent bleeding apply the nasal clamp that was given to you in the ER.  Keep that in place for 20 minutes.  You may also want apply a cold compress to the bridge of your nose.  If the bleeding persists after this then return to the ER.  Follow your primary care provider as needed.  Return to the ER for any other concerns issues that may arise.
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
## 2023-05-15 ENCOUNTER — OFFICE VISIT (OUTPATIENT)
Dept: PSYCHIATRY | Facility: CLINIC | Age: 53
End: 2023-05-15
Payer: COMMERCIAL

## 2023-05-15 DIAGNOSIS — F41.9 ANXIETY AND DEPRESSION: ICD-10-CM

## 2023-05-15 DIAGNOSIS — F32.A ANXIETY AND DEPRESSION: ICD-10-CM

## 2023-05-15 NOTE — PSYCHOTHERAPY NOTE
barbraht in rehab, called probation    meth   found place where baby can go   I did not seep for 5 days,  Araid,  Nose bleed     hes soft, hes on my side too now   Tell  cant come back   I do sleep  im handling it  Starting to garden  Son is doing well,  He is supportive   Call him -focus on him

## 2023-05-18 ENCOUNTER — OFFICE VISIT (OUTPATIENT)
Dept: PSYCHIATRY | Facility: CLINIC | Age: 53
End: 2023-05-18
Payer: COMMERCIAL

## 2023-05-18 VITALS
SYSTOLIC BLOOD PRESSURE: 130 MMHG | HEIGHT: 65 IN | HEART RATE: 71 BPM | BODY MASS INDEX: 27.22 KG/M2 | WEIGHT: 163.4 LBS | DIASTOLIC BLOOD PRESSURE: 81 MMHG

## 2023-05-18 DIAGNOSIS — F41.1 GENERALIZED ANXIETY DISORDER: ICD-10-CM

## 2023-05-18 DIAGNOSIS — F51.05 INSOMNIA DUE TO MENTAL DISORDER: ICD-10-CM

## 2023-05-18 DIAGNOSIS — F33.1 MAJOR DEPRESSIVE DISORDER, RECURRENT EPISODE, MODERATE: Primary | ICD-10-CM

## 2023-05-18 RX ORDER — PANTOPRAZOLE SODIUM 40 MG/1
TABLET, DELAYED RELEASE ORAL EVERY 12 HOURS SCHEDULED
COMMUNITY

## 2023-05-18 RX ORDER — LIDOCAINE 50 MG/G
PATCH TOPICAL
COMMUNITY

## 2023-05-18 RX ORDER — METHYLPREDNISOLONE 4 MG/1
TABLET ORAL
COMMUNITY
Start: 2023-04-28

## 2023-05-18 RX ORDER — ASPIRIN 81 MG/1
TABLET, CHEWABLE ORAL
COMMUNITY

## 2023-05-18 NOTE — PROGRESS NOTES
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 11/27/2022 12.3 (H)  6.0 - 12.0 fL Final   • Platelets 11/27/2022 152  140 - 450 10*3/mm3 Final   • Glucose 11/27/2022 128 (H)  65 - 99 mg/dL Final   • BUN 11/27/2022 11  6 - 20 mg/dL Final   • Creatinine 11/27/2022 0.68  0.57 - 1.00 mg/dL Final   • Sodium 11/27/2022 139  136 - 145 mmol/L Final   • Potassium 11/27/2022 4.5  3.5 - 5.2 mmol/L Final   • Chloride 11/27/2022 102  98 - 107 mmol/L Final   • CO2 11/27/2022 27.2  22.0 - 29.0 mmol/L Final   • Calcium 11/27/2022 9.9  8.6 - 10.5 mg/dL Final   • Total Protein 11/27/2022 7.5  6.0 - 8.5 g/dL Final   • Albumin 11/27/2022 4.20  3.50 - 5.20 g/dL Final   • ALT (SGPT) 11/27/2022 24  1 - 33 U/L Final   • AST (SGOT) 11/27/2022 20  1 - 32 U/L Final   • Alkaline Phosphatase 11/27/2022 109  39 - 117 U/L Final   • Total Bilirubin 11/27/2022 0.5  0.0 - 1.2 mg/dL Final   • Globulin 11/27/2022 3.3  gm/dL Final   • A/G Ratio 11/27/2022 1.3  g/dL Final   • BUN/Creatinine Ratio 11/27/2022 16.2  7.0 - 25.0 Final   • Anion Gap 11/27/2022 9.8  5.0 - 15.0 mmol/L Final   • eGFR 11/27/2022 103.6  >60.0 mL/min/1.73 Final    National Kidney [employer] and American Society of Nephrology (ASN) Task Force recommended calculation based on the Chronic Kidney Disease Epidemiology Collaboration (CKD-EPI) equation refit without adjustment for race.   Admission  on 09/06/2022, Discharged on 09/06/2022   Component Date Value Ref Range Status   • QT Interval 09/06/2022 366  ms Final   • QT Interval 09/06/2022 407  ms Corrected   • Glucose 09/06/2022 119 (H)  65 - 99 mg/dL Final   • BUN 09/06/2022 12  6 - 20 mg/dL Final   • Creatinine 09/06/2022 0.53 (L)  0.57 - 1.00 mg/dL Final   • Sodium 09/06/2022 139  136 - 145 mmol/L Final   • Potassium 09/06/2022 3.6  3.5 - 5.2 mmol/L Final   • Chloride 09/06/2022 103  98 - 107 mmol/L Final   • CO2 09/06/2022 24.4  22.0 - 29.0 mmol/L Final   • Calcium 09/06/2022 9.1  8.6 - 10.5 mg/dL Final   • Total Protein 09/06/2022 7.7  6.0 - 8.5 g/dL Final   • Albumin 09/06/2022 4.20  3.50 - 5.20 g/dL Final   • ALT (SGPT) 09/06/2022 18  1 - 33 U/L Final   • AST (SGOT) 09/06/2022 19  1 - 32 U/L Final   • Alkaline Phosphatase 09/06/2022 120 (H)  39 - 117 U/L Final   • Total Bilirubin 09/06/2022 0.3  0.0 - 1.2 mg/dL Final   • Globulin 09/06/2022 3.5  gm/dL Final   • A/G Ratio 09/06/2022 1.2  g/dL Final   • BUN/Creatinine Ratio 09/06/2022 22.6  7.0 - 25.0 Final   • Anion Gap 09/06/2022 11.6  5.0 - 15.0 mmol/L Final   • eGFR 09/06/2022 110.1  >60.0 mL/min/1.73 Final    National Kidney Foundation and American Society of Nephrology (ASN) Task Force recommended calculation based on the Chronic Kidney Disease Epidemiology Collaboration (CKD-EPI) equation refit without adjustment for race.   • Lipase 09/06/2022 36  13 - 60 U/L Final   • proBNP 09/06/2022 107.4  0.0 - 900.0 pg/mL Final   • Magnesium 09/06/2022 1.8  1.6 - 2.6 mg/dL Final   • Extra Tube 09/06/2022 11   Final   • Extra Tube 09/06/2022 11   Final   • Extra Tube 09/06/2022 11   Final   • Extra Tube 09/06/2022 11   Final   • Extra Tube 09/06/2022 Hold for add-ons.   Final    Auto resulted.   • Extra Tube 09/06/2022 Hold for add-ons.   Final    Auto resulted.   • WBC 09/06/2022 10.40  3.40 - 10.80 10*3/mm3 Final   • RBC 09/06/2022 5.00  3.77 - 5.28 10*6/mm3 Final   • Hemoglobin 09/06/2022 14.0  12.0 -  15.9 g/dL Final   • Hematocrit 09/06/2022 41.8  34.0 - 46.6 % Final   • MCV 09/06/2022 83.6  79.0 - 97.0 fL Final   • MCH 09/06/2022 28.0  26.6 - 33.0 pg Final   • MCHC 09/06/2022 33.5  31.5 - 35.7 g/dL Final   • RDW 09/06/2022 13.8  12.3 - 15.4 % Final   • RDW-SD 09/06/2022 42.0  37.0 - 54.0 fl Final   • MPV 09/06/2022 13.2 (H)  6.0 - 12.0 fL Final   • Platelets 09/06/2022 141  140 - 450 10*3/mm3 Final   • Neutrophil % 09/06/2022 66.0  42.7 - 76.0 % Final   • Lymphocyte % 09/06/2022 24.9  19.6 - 45.3 % Final   • Monocyte % 09/06/2022 6.3  5.0 - 12.0 % Final   • Eosinophil % 09/06/2022 1.8  0.3 - 6.2 % Final   • Basophil % 09/06/2022 0.8  0.0 - 1.5 % Final   • Immature Grans % 09/06/2022 0.2  0.0 - 0.5 % Final   • Neutrophils, Absolute 09/06/2022 6.87  1.70 - 7.00 10*3/mm3 Final   • Lymphocytes, Absolute 09/06/2022 2.59  0.70 - 3.10 10*3/mm3 Final   • Monocytes, Absolute 09/06/2022 0.65  0.10 - 0.90 10*3/mm3 Final   • Eosinophils, Absolute 09/06/2022 0.19  0.00 - 0.40 10*3/mm3 Final   • Basophils, Absolute 09/06/2022 0.08  0.00 - 0.20 10*3/mm3 Final   • Immature Grans, Absolute 09/06/2022 0.02  0.00 - 0.05 10*3/mm3 Final   • nRBC 09/06/2022 0.0  0.0 - 0.2 /100 WBC Final   • Troponin I 09/06/2022 0.00  0.00 - 0.08 ng/mL Final    Serial Number: [id number]  [id number]   • Troponin I 09/06/2022 0.00  0.00 - 0.08 ng/mL Final    Serial Number: [id number]  [id number]   Admission on 05/20/2022, Discharged on 05/20/2022   Component Date Value Ref Range Status   • Influenza A Ag, EIA 05/19/2022 Negative  Negative Final   • Influenza B Ag, EIA 05/19/2022 Negative  Negative Final   • Strep A Ag 05/19/2022 Negative  Negative Final   • COVID19 05/19/2022 Not Detected  Not Detected - Ref. Range Final   • Throat Culture, Beta Strep 05/19/2022 No Beta Hemolytic Streptococcus Isolated   Final       EKG Results:  No orders to display       Imaging Results:  CT Angiogram Neck    Result Date: 11/26/2022    1. No hemodynamically  significant stenosis of the major cervical arteries, as above 2. Heterogeneous appearance of the thyroid may be secondary to a history of thyroiditis and or small nodules. 3. Multiple cervical lymph nodes bilaterally at the upper limit of normal for size.     Manolo Vigil M.D.       Electronically Signed and Approved By: Manolo Vigil M.D. on 11/26/2022 at 8:50             MRI Brain Without Contrast    Result Date: 1/10/2023   Scattered foci of increased T2 and FLAIR signal consistent with chronic microvascular ischemia.  No significant change.  No acute intracranial abnormality.      EMILI WILSON MD       Electronically Signed and Approved By: EMILI WILSON MD on 1/10/2023 at 1:06             MRI Brain Without Contrast    Result Date: 11/26/2022    1. No acute infarction or intracranial hemorrhage 2. Mild signal abnormality in the cerebral white matter , nonspecific in appearance but most likely representing small vessel ischemic disease in a patient of this age. 3. Prominent mucosal inflammatory changes in the bilateral ethmoid and right maxillary sinuses.      Manolo Vigil M.D.       Electronically Signed and Approved By: Manolo Vigil M.D. on 11/26/2022 at 14:59             XR Chest 1 View    Result Date: 11/26/2022    1. No acute cardiopulmonary disease       Manolo Vigil M.D.       Electronically Signed and Approved By: Manolo Vigil M.D. on 11/26/2022 at 8:32             XR Pelvis 1 or 2 View    Result Date: 11/26/2022    1. No acute finding 2. Mild bilateral hip osteoarthritis      Manolo Vigil M.D.       Electronically Signed and Approved By: Manolo Vigil M.D. on 11/26/2022 at 13:31             MRI Hip Right Arthrogram    Result Date: 1/13/2023    1. Mild bilateral hip osteoarthritis 2. No internal derangement of the right hip joint     Manolo Vigil M.D.       Electronically Signed and Approved By: Manolo Vigil M.D. on 1/13/2023 at 14:21             CT Head Without Contrast Stroke Protocol    Result  Date: 11/26/2022    1. Mild small vessel ischemic changes in the white matter     Manolo Vigil M.D.       Electronically Signed and Approved By: Manolo Vigil M.D. on 11/26/2022 at 7:49             CT Angiogram Head w AI Analysis of LVO    Result Date: 11/26/2022    1. Relatively mild atherosclerotic calcification involving the intracranial segments of both internal carotid arteries. 2. No hemodynamically significant stenosis of the intracranial vasculature.     Manolo Vigil M.D.       Electronically Signed and Approved By: Manolo Vigil M.D. on 11/26/2022 at 9:18             CT CEREBRAL PERFUSION WITH & WITHOUT CONTRAST    Result Date: 11/26/2022    1. Study within normal limits.      Manolo Vigil M.D.       Electronically Signed and Approved By: Manolo Vigil M.D. on 11/26/2022 at 7:57             [place] Contrast Injection CT / MRI    Result Date: 1/13/2023   Right hip Arthrogram was performed without complication, patient tolerated procedure.  The patient was instructed to obtain follow up care from the referring physician.   The above procedure was directly supervised by Dr. Vigil.   LINDSEY HUDSON       Electronically Signed and Approved By: Manolo Vigil M.D. on 1/13/2023 at 12:08                 Assessment & Plan   Diagnoses and all orders for this visit:    1. Major depressive disorder, recurrent episode, moderate (Primary)    2. Generalized anxiety disorder    3. Insomnia due to mental disorder      Continue mirtazapine to target depression and anxiety. Continue hydroxyzine as needed for anxiety. Sleep hygiene education for insomnia. 16 minutes of supportive psychotherapy with goal to strengthen defenses, promote problems solving, restore adaptive functioning and provide symptom relief. The therapeutic alliance was strengthened to encourage the patient to express their thoughts and feelings. Esteem building was enhanced through praise, reassurance, normalizing and encouragement. Coping skills were enhanced to  build distress tolerance skills and emotional regulation. Allowed patient to freely discuss issues without interruption or judgement with unconditional positive regard, active listening skills, and empathy. Provided a safe, confidential environment to facilitate the development of a positive therapeutic relationship and encourage open, honest communication. Assisted patient in identifying risk factors which would indicate the need for higher level of care including thoughts to harm self or others and/or self-harming behavior and encouraged patient to contact this office, call 911, or present to the nearest emergency room should any of these events occur. Assisted patient in processing session content; acknowledged and normalized patient's thoughts, feelings, and concerns by utilizing a person-centered approach in efforts to build appropriate rapport and a positive therapeutic relationship with open and honest communication. Patient given education on medication side effects, diagnosis/illness and relapse symptoms. Plan to continue supportive psychotherapy in next appointment to provide symptom relief. 4 weeks    Diagnoses: as above  Symptoms: as above  Functional status: good  Mental Status Exam: as above     Treatment plan: medication management and supportive psychotherapy  Prognosis: good  Progress: continued improvement    Visit Diagnoses:    ICD-10-CM ICD-9-CM   1. Major depressive disorder, recurrent episode, moderate  F33.1 296.32   2. Generalized anxiety disorder  F41.1 300.02   3. Insomnia due to mental disorder  F51.05 300.9     327.02       PLAN:  11. Safety: No acute safety concerns.   12. Therapy: Trudi Almeida  13. Risk Assessment: Risk of self-harm acutely is moderate.  Risk factors include anxiety disorder, mood disorder, and recent psychosocial stressors (pandemic). Protective factors include no family history, denies access to guns/weapons, no present SI, no history of suicide attempts or  self-harm in the past, minimal AODA, healthcare seeking, future orientation, willingness to engage in care.  Risk of self-harm chronically is also moderate, but could be further elevated in the event of treatment noncompliance and/or AODA.  14. Medications: Continue mirtazapine 7.5mg po qhs to target depression and anxiety. Risks, benefits, alternatives discussed with patient including GI upset, sedation, dizziness with falls risk, increased appetite.  After discussion of these risks and benefits, the patient voiced understanding and agreed to proceed.Continue hydroxyzine 25mg po qday prn anxiety. Risks, benefits, alternatives discussed with patient including sedation, dizziness, fall risk, GI upset, and risk of increased CNS depression and elevated heart rate if taken with other antihistamines.  After discussion of these risks and benefits, the patient voiced understanding and agreed to proceed.  15. Labs/studies: No labs/studies ordered at this time  16. Follow-up: 4 weeks    Patient screened positive for depression based on a PHQ-9 score of  on . Follow-up recommendations include: Suicide Risk Assessment performed.         TREATMENT PLAN/GOALS: Continue supportive psychotherapy efforts and medications as indicated. Treatment and medication options discussed during today's visit. Patient ackowledged and verbally consented to continue with current treatment plan and was educated on the importance of compliance with treatment and follow-up appointments.      MEDICATION ISSUES:  OSIEL reviewed as expected.  Discussed medication options and treatment plan of prescribed medication as well as the risks, benefits, and side effects including potential falls, possible impaired driving and metabolic adversities among others. Patient is agreeable to call the office with any worsening of symptoms or onset of side effects. Patient is agreeable to call 911 or go to the nearest ER should he/she begin having SI/HI. No medication  side effects or related complaints today.     MEDS ORDERED DURING VISIT:  No orders of the defined types were placed in this encounter.      Return in about 4 weeks (around 6/15/2023) for Next scheduled follow up.         This document has been electronically signed by ZAYNAB Sinclair  May 18, 2023 09:59 EDT      Part of this note may be an electronic transcription/translation of spoken language to printed text using the Dragon Dictation System.

## 2023-06-16 DIAGNOSIS — F33.1 MAJOR DEPRESSIVE DISORDER, RECURRENT EPISODE, MODERATE: ICD-10-CM

## 2023-06-16 RX ORDER — MIRTAZAPINE 7.5 MG/1
7.5 TABLET, FILM COATED ORAL NIGHTLY
Qty: 30 TABLET | Refills: 2 | Status: SHIPPED | OUTPATIENT
Start: 2023-06-16

## 2023-06-19 ENCOUNTER — TELEMEDICINE (OUTPATIENT)
Dept: PSYCHIATRY | Facility: CLINIC | Age: 53
End: 2023-06-19
Payer: COMMERCIAL

## 2023-06-19 DIAGNOSIS — F41.1 GENERALIZED ANXIETY DISORDER: ICD-10-CM

## 2023-06-19 DIAGNOSIS — F33.1 MAJOR DEPRESSIVE DISORDER, RECURRENT EPISODE, MODERATE: Primary | ICD-10-CM

## 2023-06-19 DIAGNOSIS — F51.05 INSOMNIA DUE TO MENTAL DISORDER: ICD-10-CM

## 2023-06-19 PROBLEM — M54.16 LUMBAR RADICULOPATHY: Status: ACTIVE | Noted: 2023-06-19

## 2023-06-19 RX ORDER — PROPRANOLOL HYDROCHLORIDE 80 MG/1
CAPSULE, EXTENDED RELEASE ORAL
COMMUNITY
Start: 2023-06-15

## 2023-06-19 NOTE — PROGRESS NOTES
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 - 15.4 % Final    RDW-SD 11/27/2022 45.0  37.0 - 54.0 fl Final    MPV 11/27/2022 12.3 (H)  6.0 - 12.0 fL Final    Platelets 11/27/2022 152  140 - 450 10*3/mm3 Final    Glucose 11/27/2022 128 (H)  65 - 99 mg/dL Final    BUN 11/27/2022 11  6 - 20 mg/dL Final    Creatinine 11/27/2022 0.68  0.57 - 1.00 mg/dL Final    Sodium 11/27/2022 139  136 - 145 mmol/L Final    Potassium 11/27/2022 4.5  3.5 - 5.2 mmol/L Final    Chloride 11/27/2022 102  98 - 107 mmol/L Final    CO2 11/27/2022 27.2  22.0 - 29.0 mmol/L Final    Calcium 11/27/2022 9.9  8.6 - 10.5 mg/dL Final    Total Protein 11/27/2022 7.5  6.0 - 8.5 g/dL Final    Albumin 11/27/2022 4.20  3.50 - 5.20 g/dL Final    ALT (SGPT) 11/27/2022 24  1 - 33 U/L Final    AST (SGOT) 11/27/2022 20  1 - 32 U/L Final    Alkaline Phosphatase 11/27/2022 109  39 - 117 U/L Final    Total Bilirubin 11/27/2022 0.5  0.0 - 1.2 mg/dL Final    Globulin 11/27/2022 3.3  gm/dL Final    A/G Ratio 11/27/2022 1.3  g/dL Final    BUN/Creatinine Ratio 11/27/2022 16.2  7.0 - 25.0 Final    Anion Gap 11/27/2022 9.8  5.0 - 15.0 mmol/L Final    eGFR 11/27/2022 103.6  >60.0 mL/min/1.73 Final    National Kidney Foundation and American Society of Nephrology (ASN) Task Force recommended calculation based on the Chronic Kidney Disease Epidemiology Collaboration (CKD-EPI) equation refit  without adjustment for race.   Admission on 09/06/2022, Discharged on 09/06/2022   Component Date Value Ref Range Status    QT Interval 09/06/2022 366  ms Final    QT Interval 09/06/2022 407  ms Corrected    Glucose 09/06/2022 119 (H)  65 - 99 mg/dL Final    BUN 09/06/2022 12  6 - 20 mg/dL Final    Creatinine 09/06/2022 0.53 (L)  0.57 - 1.00 mg/dL Final    Sodium 09/06/2022 139  136 - 145 mmol/L Final    Potassium 09/06/2022 3.6  3.5 - 5.2 mmol/L Final    Chloride 09/06/2022 103  98 - 107 mmol/L Final    CO2 09/06/2022 24.4  22.0 - 29.0 mmol/L Final    Calcium 09/06/2022 9.1  8.6 - 10.5 mg/dL Final    Total Protein 09/06/2022 7.7  6.0 - 8.5 g/dL Final    Albumin 09/06/2022 4.20  3.50 - 5.20 g/dL Final    ALT (SGPT) 09/06/2022 18  1 - 33 U/L Final    AST (SGOT) 09/06/2022 19  1 - 32 U/L Final    Alkaline Phosphatase 09/06/2022 120 (H)  39 - 117 U/L Final    Total Bilirubin 09/06/2022 0.3  0.0 - 1.2 mg/dL Final    Globulin 09/06/2022 3.5  gm/dL Final    A/G Ratio 09/06/2022 1.2  g/dL Final    BUN/Creatinine Ratio 09/06/2022 22.6  7.0 - 25.0 Final    Anion Gap 09/06/2022 11.6  5.0 - 15.0 mmol/L Final    eGFR 09/06/2022 110.1  >60.0 mL/min/1.73 Final    National Kidney Foundation and American Society of Nephrology (ASN) Task Force recommended calculation based on the Chronic Kidney Disease Epidemiology Collaboration (CKD-EPI) equation refit without adjustment for race.    Lipase 09/06/2022 36  13 - 60 U/L Final    proBNP 09/06/2022 107.4  0.0 - 900.0 pg/mL Final    Magnesium 09/06/2022 1.8  1.6 - 2.6 mg/dL Final    Extra Tube 09/06/2022 11   Final    Extra Tube 09/06/2022 11   Final    Extra Tube 09/06/2022 11   Final    Extra Tube 09/06/2022 11   Final    Extra Tube 09/06/2022 Hold for add-ons.   Final    Auto resulted.    Extra Tube 09/06/2022 Hold for add-ons.   Final    Auto resulted.    WBC 09/06/2022 10.40  3.40 - 10.80 10*3/mm3 Final    RBC 09/06/2022 5.00  3.77 - 5.28 10*6/mm3 Final    Hemoglobin 09/06/2022 14.0   12.0 - 15.9 g/dL Final    Hematocrit 09/06/2022 41.8  34.0 - 46.6 % Final    MCV 09/06/2022 83.6  79.0 - 97.0 fL Final    MCH 09/06/2022 28.0  26.6 - 33.0 pg Final    MCHC 09/06/2022 33.5  31.5 - 35.7 g/dL Final    RDW 09/06/2022 13.8  12.3 - 15.4 % Final    RDW-SD 09/06/2022 42.0  37.0 - 54.0 fl Final    MPV 09/06/2022 13.2 (H)  6.0 - 12.0 fL Final    Platelets 09/06/2022 141  140 - 450 10*3/mm3 Final    Neutrophil % 09/06/2022 66.0  42.7 - 76.0 % Final    Lymphocyte % 09/06/2022 24.9  19.6 - 45.3 % Final    Monocyte % 09/06/2022 6.3  5.0 - 12.0 % Final    Eosinophil % 09/06/2022 1.8  0.3 - 6.2 % Final    Basophil % 09/06/2022 0.8  0.0 - 1.5 % Final    Immature Grans % 09/06/2022 0.2  0.0 - 0.5 % Final    Neutrophils, Absolute 09/06/2022 6.87  1.70 - 7.00 10*3/mm3 Final    Lymphocytes, Absolute 09/06/2022 2.59  0.70 - 3.10 10*3/mm3 Final    Monocytes, Absolute 09/06/2022 0.65  0.10 - 0.90 10*3/mm3 Final    Eosinophils, Absolute 09/06/2022 0.19  0.00 - 0.40 10*3/mm3 Final    Basophils, Absolute 09/06/2022 0.08  0.00 - 0.20 10*3/mm3 Final    Immature Grans, Absolute 09/06/2022 0.02  0.00 - 0.05 10*3/mm3 Final    nRBC 09/06/2022 0.0  0.0 - 0.2 /100 WBC Final    Troponin I 09/06/2022 0.00  0.00 - 0.08 ng/mL Final    Serial Number: [id number]  [id number]    Troponin I 09/06/2022 0.00  0.00 - 0.08 ng/mL Final    Serial Number: [id number]  [id number]       EKG Results:  No orders to display       Imaging Results:  CT Angiogram Neck    Result Date: 11/26/2022    1. No hemodynamically significant stenosis of the major cervical arteries, as above 2. Heterogeneous appearance of the thyroid may be secondary to a history of thyroiditis and or small nodules. 3. Multiple cervical lymph nodes bilaterally at the upper limit of normal for size.     Manolo Vigil M.D.       Electronically Signed and Approved By: Manolo Vigil M.D. on 11/26/2022 at 8:50             MRI Brain Without Contrast    Result Date: 1/10/2023   Scattered  foci of increased T2 and FLAIR signal consistent with chronic microvascular ischemia.  No significant change.  No acute intracranial abnormality.      EMILI WILSON MD       Electronically Signed and Approved By: EMILI WILSON MD on 1/10/2023 at 1:06             MRI Brain Without Contrast    Result Date: 11/26/2022    1. No acute infarction or intracranial hemorrhage 2. Mild signal abnormality in the cerebral white matter , nonspecific in appearance but most likely representing small vessel ischemic disease in a patient of this age. 3. Prominent mucosal inflammatory changes in the bilateral ethmoid and right maxillary sinuses.      Manolo Vigil M.D.       Electronically Signed and Approved By: Manolo Vigil M.D. on 11/26/2022 at 14:59             XR Chest 1 View    Result Date: 11/26/2022    1. No acute cardiopulmonary disease       Manolo Vigil M.D.       Electronically Signed and Approved By: Manolo Vigil M.D. on 11/26/2022 at 8:32             XR Pelvis 1 or 2 View    Result Date: 11/26/2022    1. No acute finding 2. Mild bilateral hip osteoarthritis      Manolo Vigil M.D.       Electronically Signed and Approved By: Manolo Vigil M.D. on 11/26/2022 at 13:31             MRI Hip Right Arthrogram    Result Date: 1/13/2023    1. Mild bilateral hip osteoarthritis 2. No internal derangement of the right hip joint     Manolo Vigil M.D.       Electronically Signed and Approved By: Manolo Vigil M.D. on 1/13/2023 at 14:21             CT Head Without Contrast Stroke Protocol    Result Date: 11/26/2022    1. Mild small vessel ischemic changes in the white matter     Manolo Vigil M.D.       Electronically Signed and Approved By: Manolo Vigil M.D. on 11/26/2022 at 7:49             CT Angiogram Head w AI Analysis of LVO    Result Date: 11/26/2022    1. Relatively mild atherosclerotic calcification involving the intracranial segments of both internal carotid arteries. 2. No hemodynamically significant stenosis of the  intracranial vasculature.     Manolo Vigil M.D.       Electronically Signed and Approved By: Manolo Vigil M.D. on 11/26/2022 at 9:18             CT CEREBRAL PERFUSION WITH & WITHOUT CONTRAST    Result Date: 11/26/2022    1. Study within normal limits.      Manolo Vigil M.D.       Electronically Signed and Approved By: Manolo Vigil M.D. on 11/26/2022 at 7:57             [place] Contrast Injection CT / MRI    Result Date: 1/13/2023   Right hip Arthrogram was performed without complication, patient tolerated procedure.  The patient was instructed to obtain follow up care from the referring physician.   The above procedure was directly supervised by Dr. Vigil.   LINDSEY HUDSON       Electronically Signed and Approved By: Manolo Vigil M.D. on 1/13/2023 at 12:08                 Assessment & Plan   Diagnoses and all orders for this visit:    1. Major depressive disorder, recurrent episode, moderate (Primary)    2. Generalized anxiety disorder    3. Insomnia due to mental disorder        Continue mirtazapine to target depression and anxiety. Continue hydroxyzine as needed for anxiety. Sleep hygiene education for insomnia. 16 minutes of supportive psychotherapy with goal to strengthen defenses, promote problems solving, restore adaptive functioning and provide symptom relief. The therapeutic alliance was strengthened to encourage the patient to express their thoughts and feelings. Esteem building was enhanced through praise, reassurance, normalizing and encouragement. Coping skills were enhanced to build distress tolerance skills and emotional regulation. Allowed patient to freely discuss issues without interruption or judgement with unconditional positive regard, active listening skills, and empathy. Provided a safe, confidential environment to facilitate the development of a positive therapeutic relationship and encourage open, honest communication. Assisted patient in identifying risk factors which would indicate the need  for higher level of care including thoughts to harm self or others and/or self-harming behavior and encouraged patient to contact this office, call 911, or present to the nearest emergency room should any of these events occur. Assisted patient in processing session content; acknowledged and normalized patient's thoughts, feelings, and concerns by utilizing a person-centered approach in efforts to build appropriate rapport and a positive therapeutic relationship with open and honest communication. Patient given education on medication side effects, diagnosis/illness and relapse symptoms. Plan to continue supportive psychotherapy in next appointment to provide symptom relief. 4 weeks    Diagnoses: as above  Symptoms: as above  Functional status: good  Mental Status Exam: as above     Treatment plan: medication management and supportive psychotherapy  Prognosis: good  Progress: continued improvement    Visit Diagnoses:    ICD-10-CM ICD-9-CM   1. Major depressive disorder, recurrent episode, moderate  F33.1 296.32   2. Generalized anxiety disorder  F41.1 300.02   3. Insomnia due to mental disorder  F51.05 300.9     327.02         PLAN:  Safety: No acute safety concerns.   Therapy: Trudi Almeida  Risk Assessment: Risk of self-harm acutely is moderate.  Risk factors include anxiety disorder, mood disorder, and recent psychosocial stressors (pandemic). Protective factors include no family history, denies access to guns/weapons, no present SI, no history of suicide attempts or self-harm in the past, minimal AODA, healthcare seeking, future orientation, willingness to engage in care.  Risk of self-harm chronically is also moderate, but could be further elevated in the event of treatment noncompliance and/or AODA.  Medications: Continue mirtazapine 7.5mg po qhs to target depression and anxiety. Risks, benefits, alternatives discussed with patient including GI upset, sedation, dizziness with falls risk, increased appetite.   After discussion of these risks and benefits, the patient voiced understanding and agreed to proceed.Continue hydroxyzine 25mg po qday prn anxiety. Risks, benefits, alternatives discussed with patient including sedation, dizziness, fall risk, GI upset, and risk of increased CNS depression and elevated heart rate if taken with other antihistamines.  After discussion of these risks and benefits, the patient voiced understanding and agreed to proceed.  Labs/studies: No labs/studies ordered at this time  Follow-up: 4 weeks    Patient screened positive for depression based on a PHQ-9 score of  on . Follow-up recommendations include: Suicide Risk Assessment performed.         TREATMENT PLAN/GOALS: Continue supportive psychotherapy efforts and medications as indicated. Treatment and medication options discussed during today's visit. Patient ackowledged and verbally consented to continue with current treatment plan and was educated on the importance of compliance with treatment and follow-up appointments.      MEDICATION ISSUES:  OSIEL reviewed as expected.  Discussed medication options and treatment plan of prescribed medication as well as the risks, benefits, and side effects including potential falls, possible impaired driving and metabolic adversities among others. Patient is agreeable to call the office with any worsening of symptoms or onset of side effects. Patient is agreeable to call 911 or go to the nearest ER should he/she begin having SI/HI. No medication side effects or related complaints today.     MEDS ORDERED DURING VISIT:  No orders of the defined types were placed in this encounter.      Return in about 4 weeks (around 7/17/2023) for Next scheduled follow up.         This document has been electronically signed by ZAYNAB Sinclair  June 19, 2023 11:30 EDT      Part of this note may be an electronic transcription/translation of spoken language to printed text using the Dragon Dictation System.

## 2023-07-20 ENCOUNTER — TELEPHONE (OUTPATIENT)
Dept: NEUROSURGERY | Facility: CLINIC | Age: 53
End: 2023-07-20

## 2023-07-20 NOTE — TELEPHONE ENCOUNTER
Caller: Zoya Crook    Relationship: Self    Best call back number: [phone]    What form or medical record are you requesting: LETTER STATING WHY SHE IS SEEING OUR PROVIDER.     Who is requesting this form or medical record from you: [employer]/    How would you like to receive the form or medical records (pick-up, mail, fax):   Timeframe paperwork needed: ASAP    Additional notes: PLEASE CALL PT TO ADVISE    THANK YOU,

## 2023-07-24 ENCOUNTER — TELEMEDICINE (OUTPATIENT)
Dept: PSYCHIATRY | Facility: CLINIC | Age: 53
End: 2023-07-24
Payer: COMMERCIAL

## 2023-07-24 DIAGNOSIS — F33.1 MAJOR DEPRESSIVE DISORDER, RECURRENT EPISODE, MODERATE: Primary | ICD-10-CM

## 2023-07-24 DIAGNOSIS — F51.05 INSOMNIA DUE TO MENTAL DISORDER: ICD-10-CM

## 2023-07-24 DIAGNOSIS — F41.1 GENERALIZED ANXIETY DISORDER: ICD-10-CM

## 2023-07-24 PROCEDURE — 1159F MED LIST DOCD IN RCRD: CPT | Performed by: NURSE PRACTITIONER

## 2023-07-24 PROCEDURE — 99214 OFFICE O/P EST MOD 30 MIN: CPT | Performed by: NURSE PRACTITIONER

## 2023-07-24 PROCEDURE — 90833 PSYTX W PT W E/M 30 MIN: CPT | Performed by: NURSE PRACTITIONER

## 2023-07-24 PROCEDURE — 1160F RVW MEDS BY RX/DR IN RCRD: CPT | Performed by: NURSE PRACTITIONER

## 2023-07-24 RX ORDER — MIRTAZAPINE 7.5 MG/1
7.5 TABLET, FILM COATED ORAL NIGHTLY
Qty: 30 TABLET | Refills: 2 | Status: SHIPPED | OUTPATIENT
Start: 2023-07-24

## 2023-07-24 NOTE — PROGRESS NOTES
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 11/27/2022 11  6 - 20 mg/dL Final    Creatinine 11/27/2022 0.68  0.57 - 1.00 mg/dL Final    Sodium 11/27/2022 139  136 - 145 mmol/L Final    Potassium 11/27/2022 4.5  3.5 - 5.2 mmol/L Final    Chloride 11/27/2022 102  98 - 107 mmol/L Final    CO2 11/27/2022 27.2  22.0 - 29.0 mmol/L Final    Calcium 11/27/2022 9.9  8.6 - 10.5 mg/dL Final    Total Protein 11/27/2022 7.5  6.0 - 8.5 g/dL Final    Albumin 11/27/2022 4.20  3.50 - 5.20 g/dL Final    ALT (SGPT) 11/27/2022 24  1 - 33 U/L Final    AST (SGOT) 11/27/2022 20  1 - 32 U/L Final    Alkaline Phosphatase 11/27/2022 109  39 - 117 U/L Final    Total Bilirubin 11/27/2022 0.5  0.0 - 1.2 mg/dL Final    Globulin 11/27/2022 3.3  gm/dL Final    A/G Ratio 11/27/2022 1.3  g/dL Final    BUN/Creatinine Ratio 11/27/2022 16.2  7.0 - 25.0 Final    Anion Gap 11/27/2022 9.8  5.0 - 15.0 mmol/L Final    eGFR 11/27/2022 103.6  >60.0 mL/min/1.73 Final    National Kidney Foundation and American Society of Nephrology (ASN) Task Force recommended calculation based on the Chronic Kidney Disease Epidemiology Collaboration (CKD-EPI) equation refit without adjustment for race.   Admission on 09/06/2022, Discharged on 09/06/2022   Component Date Value Ref Range Status    QT Interval 09/06/2022 366  ms Final    QT Interval 09/06/2022 407  ms Corrected     Glucose 09/06/2022 119 (H)  65 - 99 mg/dL Final    BUN 09/06/2022 12  6 - 20 mg/dL Final    Creatinine 09/06/2022 0.53 (L)  0.57 - 1.00 mg/dL Final    Sodium 09/06/2022 139  136 - 145 mmol/L Final    Potassium 09/06/2022 3.6  3.5 - 5.2 mmol/L Final    Chloride 09/06/2022 103  98 - 107 mmol/L Final    CO2 09/06/2022 24.4  22.0 - 29.0 mmol/L Final    Calcium 09/06/2022 9.1  8.6 - 10.5 mg/dL Final    Total Protein 09/06/2022 7.7  6.0 - 8.5 g/dL Final    Albumin 09/06/2022 4.20  3.50 - 5.20 g/dL Final    ALT (SGPT) 09/06/2022 18  1 - 33 U/L Final    AST (SGOT) 09/06/2022 19  1 - 32 U/L Final    Alkaline Phosphatase 09/06/2022 120 (H)  39 - 117 U/L Final    Total Bilirubin 09/06/2022 0.3  0.0 - 1.2 mg/dL Final    Globulin 09/06/2022 3.5  gm/dL Final    A/G Ratio 09/06/2022 1.2  g/dL Final    BUN/Creatinine Ratio 09/06/2022 22.6  7.0 - 25.0 Final    Anion Gap 09/06/2022 11.6  5.0 - 15.0 mmol/L Final    eGFR 09/06/2022 110.1  >60.0 mL/min/1.73 Final    National Kidney Foundation and American Society of Nephrology (ASN) Task Force recommended calculation based on the Chronic Kidney Disease Epidemiology Collaboration (CKD-EPI) equation refit without adjustment for race.    Lipase 09/06/2022 36  13 - 60 U/L Final    proBNP 09/06/2022 107.4  0.0 - 900.0 pg/mL Final    Magnesium 09/06/2022 1.8  1.6 - 2.6 mg/dL Final    Extra Tube 09/06/2022 11   Final    Extra Tube 09/06/2022 11   Final    Extra Tube 09/06/2022 11   Final    Extra Tube 09/06/2022 11   Final    Extra Tube 09/06/2022 Hold for add-ons.   Final    Auto resulted.    Extra Tube 09/06/2022 Hold for add-ons.   Final    Auto resulted.    WBC 09/06/2022 10.40  3.40 - 10.80 10*3/mm3 Final    RBC 09/06/2022 5.00  3.77 - 5.28 10*6/mm3 Final    Hemoglobin 09/06/2022 14.0  12.0 - 15.9 g/dL Final    Hematocrit 09/06/2022 41.8  34.0 - 46.6 % Final    MCV 09/06/2022 83.6  79.0 - 97.0 fL Final    MCH 09/06/2022 28.0  26.6 - 33.0 pg Final    MCHC 09/06/2022 33.5  31.5 - 35.7 g/dL  Final    RDW 09/06/2022 13.8  12.3 - 15.4 % Final    RDW-SD 09/06/2022 42.0  37.0 - 54.0 fl Final    MPV 09/06/2022 13.2 (H)  6.0 - 12.0 fL Final    Platelets 09/06/2022 141  140 - 450 10*3/mm3 Final    Neutrophil % 09/06/2022 66.0  42.7 - 76.0 % Final    Lymphocyte % 09/06/2022 24.9  19.6 - 45.3 % Final    Monocyte % 09/06/2022 6.3  5.0 - 12.0 % Final    Eosinophil % 09/06/2022 1.8  0.3 - 6.2 % Final    Basophil % 09/06/2022 0.8  0.0 - 1.5 % Final    Immature Grans % 09/06/2022 0.2  0.0 - 0.5 % Final    Neutrophils, Absolute 09/06/2022 6.87  1.70 - 7.00 10*3/mm3 Final    Lymphocytes, Absolute 09/06/2022 2.59  0.70 - 3.10 10*3/mm3 Final    Monocytes, Absolute 09/06/2022 0.65  0.10 - 0.90 10*3/mm3 Final    Eosinophils, Absolute 09/06/2022 0.19  0.00 - 0.40 10*3/mm3 Final    Basophils, Absolute 09/06/2022 0.08  0.00 - 0.20 10*3/mm3 Final    Immature Grans, Absolute 09/06/2022 0.02  0.00 - 0.05 10*3/mm3 Final    nRBC 09/06/2022 0.0  0.0 - 0.2 /100 WBC Final    Troponin I 09/06/2022 0.00  0.00 - 0.08 ng/mL Final    Serial Number: [id number]  [id number]    Troponin I 09/06/2022 0.00  0.00 - 0.08 ng/mL Final    Serial Number: [id number]       EKG Results:  No orders to display       Imaging Results:  CT Angiogram Neck    Result Date: 11/26/2022    1. No hemodynamically significant stenosis of the major cervical arteries, as above 2. Heterogeneous appearance of the thyroid may be secondary to a history of thyroiditis and or small nodules. 3. Multiple cervical lymph nodes bilaterally at the upper limit of normal for size.     Manolo Vigil M.D.       Electronically Signed and Approved By: Manolo Vigil M.D. on 11/26/2022 at 8:50             MRI Brain Without Contrast    Result Date: 1/10/2023   Scattered foci of increased T2 and FLAIR signal consistent with chronic microvascular ischemia.  No significant change.  No acute intracranial abnormality.      EMILI WILSON MD       Electronically Signed and Approved  By: EMILI WILSON MD on 1/10/2023 at 1:06             MRI Brain Without Contrast    Result Date: 11/26/2022    1. No acute infarction or intracranial hemorrhage 2. Mild signal abnormality in the cerebral white matter , nonspecific in appearance but most likely representing small vessel ischemic disease in a patient of this age. 3. Prominent mucosal inflammatory changes in the bilateral ethmoid and right maxillary sinuses.      Manolo Vigil M.D.       Electronically Signed and Approved By: Manolo Vigil M.D. on 11/26/2022 at 14:59             XR Chest 1 View    Result Date: 11/26/2022    1. No acute cardiopulmonary disease       Manolo Vigil M.D.       Electronically Signed and Approved By: Manolo Vigil M.D. on 11/26/2022 at 8:32             XR Pelvis 1 or 2 View    Result Date: 11/26/2022    1. No acute finding 2. Mild bilateral hip osteoarthritis      Manolo Vigil M.D.       Electronically Signed and Approved By: Manolo Vigil M.D. on 11/26/2022 at 13:31             MRI Hip Right Arthrogram    Result Date: 1/13/2023    1. Mild bilateral hip osteoarthritis 2. No internal derangement of the right hip joint     Manolo Vigil M.D.       Electronically Signed and Approved By: Manolo Vigil M.D. on 1/13/2023 at 14:21             CT Head Without Contrast Stroke Protocol    Result Date: 11/26/2022    1. Mild small vessel ischemic changes in the white matter     Manolo Vigil M.D.       Electronically Signed and Approved By: Manolo Vigil M.D. on 11/26/2022 at 7:49             CT Angiogram Head w AI Analysis of LVO    Result Date: 11/26/2022    1. Relatively mild atherosclerotic calcification involving the intracranial segments of both internal carotid arteries. 2. No hemodynamically significant stenosis of the intracranial vasculature.     Manolo Vigil M.D.       Electronically Signed and Approved By: Manolo Vigil M.D. on 11/26/2022 at 9:18             CT CEREBRAL PERFUSION WITH & WITHOUT CONTRAST    Result Date:  11/26/2022    1. Study within normal limits.      Manolo Vigil M.D.       Electronically Signed and Approved By: Manolo Vigli M.D. on 11/26/2022 at 7:57             [place] Contrast Injection CT / MRI    Result Date: 1/13/2023   Right hip Arthrogram was performed without complication, patient tolerated procedure.  The patient was instructed to obtain follow up care from the referring physician.   The above procedure was directly supervised by Dr. Vigil.   LINDSEY HUDSON       Electronically Signed and Approved By: Manolo Vigil M.D. on 1/13/2023 at 12:08                 Assessment & Plan   Diagnoses and all orders for this visit:    1. Major depressive disorder, recurrent episode, moderate (Primary)  -     mirtazapine (REMERON) 7.5 MG tablet; Take 1 tablet by mouth Every Night.  Dispense: 30 tablet; Refill: 2    2. Generalized anxiety disorder    3. Insomnia due to mental disorder        Was put on duloxetine per pain management. Continue mirtazapine and duloxetine to target depression and anxiety. Continue hydroxyzine as needed for anxiety. Sleep hygiene education for insomnia. 17 minutes of supportive psychotherapy with goal to strengthen defenses, promote problems solving, restore adaptive functioning and provide symptom relief. The therapeutic alliance was strengthened to encourage the patient to express their thoughts and feelings. Esteem building was enhanced through praise, reassurance, normalizing and encouragement. Coping skills were enhanced to build distress tolerance skills and emotional regulation. Allowed patient to freely discuss issues without interruption or judgement with unconditional positive regard, active listening skills, and empathy. Provided a safe, confidential environment to facilitate the development of a positive therapeutic relationship and encourage open, honest communication. Assisted patient in identifying risk factors which would indicate the need for higher level of care including  thoughts to harm self or others and/or self-harming behavior and encouraged patient to contact this office, call 911, or present to the nearest emergency room should any of these events occur. Assisted patient in processing session content; acknowledged and normalized patient's thoughts, feelings, and concerns by utilizing a person-centered approach in efforts to build appropriate rapport and a positive therapeutic relationship with open and honest communication. Patient given education on medication side effects, diagnosis/illness and relapse symptoms. Plan to continue supportive psychotherapy in next appointment to provide symptom relief. 4 weeks    Diagnoses: as above  Symptoms: as above  Functional status: good  Mental Status Exam: as above     Treatment plan: medication management and supportive psychotherapy  Prognosis: good  Progress: anxiety s/sx    Stressors: back pain  Coping skills: positive distraction  Goals: identifying signs of stress early    Visit Diagnoses:    ICD-10-CM ICD-9-CM   1. Major depressive disorder, recurrent episode, moderate  F33.1 296.32   2. Generalized anxiety disorder  F41.1 300.02   3. Insomnia due to mental disorder  F51.05 300.9     327.02         PLAN:  Safety: No acute safety concerns.   Therapy: Trudi Almeida  Risk Assessment: Risk of self-harm acutely is moderate.  Risk factors include anxiety disorder, mood disorder, and recent psychosocial stressors (pandemic). Protective factors include no family history, denies access to guns/weapons, no present SI, no history of suicide attempts or self-harm in the past, minimal AODA, healthcare seeking, future orientation, willingness to engage in care.  Risk of self-harm chronically is also moderate, but could be further elevated in the event of treatment noncompliance and/or AODA.  Medications: Continue mirtazapine 7.5mg po qhs to target depression and anxiety. Risks, benefits, alternatives discussed with patient including GI  upset, sedation, dizziness with falls risk, increased appetite.  After discussion of these risks and benefits, the patient voiced understanding and agreed to proceed.Continue hydroxyzine 25mg po qday prn anxiety. Risks, benefits, alternatives discussed with patient including sedation, dizziness, fall risk, GI upset, and risk of increased CNS depression and elevated heart rate if taken with other antihistamines.  After discussion of these risks and benefits, the patient voiced understanding and agreed to proceed. Continue duloxetine 30mg po qday to target depression and anxiety. Discussed all risks, benefits, alternatives, and side effects of Duloxetine including but not limited to GI upset, sexual dysfunction, bleeding risk, seizure risk, weight loss, insomnia, diaphoresis, drowsiness, headache, dizziness, fatigue, activation of kimberlee or hypomania, increased fragility fracture risk, hyponatremia, increased BP, hepatotoxicity, ocular effects, withdrawal syndrome following abrupt discontinuation, serotonin syndrome, and activation of suicidal ideation and behavior.  Pt educated on the need to practice safe sex while taking this med. Discussed the need for pt to immediately call the office for any new or worsening symptoms, such as worsening depression; feeling nervous or restless; suicidal thoughts or actions; or other changes changes in mood or behavior, and all other concerns. Pt educated on med compliance and the risks of suddenly stopping this medication or missing doses. Pt verbalized understanding and is agreeable to taking Duloxetine. Addressed all questions and concerns.  Labs/studies: No labs/studies ordered at this time  Follow-up: 4 weeks    Patient screened positive for depression based on a PHQ-9 score of  on . Follow-up recommendations include: Suicide Risk Assessment performed.         TREATMENT PLAN/GOALS: Continue supportive psychotherapy efforts and medications as indicated. Treatment and medication  options discussed during today's visit. Patient ackowledged and verbally consented to continue with current treatment plan and was educated on the importance of compliance with treatment and follow-up appointments.      MEDICATION ISSUES:  OSIEL reviewed as expected.  Discussed medication options and treatment plan of prescribed medication as well as the risks, benefits, and side effects including potential falls, possible impaired driving and metabolic adversities among others. Patient is agreeable to call the office with any worsening of symptoms or onset of side effects. Patient is agreeable to call 911 or go to the nearest ER should he/she begin having SI/HI. No medication side effects or related complaints today.     MEDS ORDERED DURING VISIT:  New Medications Ordered This Visit   Medications    mirtazapine (REMERON) 7.5 MG tablet     Sig: Take 1 tablet by mouth Every Night.     Dispense:  30 tablet     Refill:  2       Return in about 4 weeks (around 8/21/2023) for Next scheduled follow up.         This document has been electronically signed by ZAYNAB Sinclair  July 24, 2023 11:34 EDT      Part of this note may be an electronic transcription/translation of spoken language to printed text using the Dragon Dictation System.

## 2023-07-25 ENCOUNTER — OFFICE VISIT (OUTPATIENT)
Dept: PSYCHIATRY | Facility: CLINIC | Age: 53
End: 2023-07-25
Payer: COMMERCIAL

## 2023-07-25 DIAGNOSIS — F41.9 ANXIETY AND DEPRESSION: Primary | ICD-10-CM

## 2023-07-25 DIAGNOSIS — F32.A ANXIETY AND DEPRESSION: Primary | ICD-10-CM

## 2023-07-25 NOTE — PSYCHOTHERAPY NOTE
Feeling tired,  stomach issues   Epidural did not help, waitning on PT  Meds make me tired, head- just started cymbalta  Affecting depression, irritable,  I want to be by myself   She is back in the camper, I think it helped   Spend a lot of time in bed, not cooking, cleaning,   [place], Happ[place],,,,, I dont know ?  Grand daughter,      Sleep ok except for pain   [employer] in [place]   Glasses,   PT  Discussed if SI go to hospital , patient agrees

## 2023-07-25 NOTE — PROGRESS NOTES
Progress Note    Date: 07/25/2023  Time In: 10:55  Time Out: 11:48    Patient Legal Name: Zoya Crook  Patient Age: 52 y.o.    CHIEF COMPLAINT: Depression     Subjective   History of Present Illness       Zoya is a 52 y.o. female who presents today as a follow-up for continued psychotherapy. Zoya was last seen for therapy on 05/15/23. At that time goals were to  Continue to set firm limits   Allow for time for Self care and seek support from family       Assessment    Mental Status Exam     Appearance: good hygiene and dressed appropriately for the weather  Behavior: calm  Cooperation:  engaged, cooperative, attentive, and friendly  Eye Contact:  good  Affect:  sad and tearful  Mood: sad, depressed, irritable, and anxious  Speech: slow  Thought Process:  linear  Thought Content: poverty of thought  Suicidal: denies  Homicidal:  denies  Hallucinations:  denies  Memory:  intact  Orientation:  person, place, time, and situation  Reliability:  reliable  Insight:  good  Judgment:  good     Clinical Intervention       ICD-10-CM ICD-9-CM   1. Anxiety and depression  F41.9 300.00    F32.A 311      Zoya reports that she missed her last appointment because she feels so bad that it is difficult for her to do basic things, no motivation. She reports that she is withdrawn, irritable, tired, trouble thinking or focusing on things. A significant limitation is also her pain and immobility due to spondylosis. She has difficulty maintaining her home and self care, denies any leisure enjoyment.   Individual psychotherapy was provided utilizing solution focused  techniques to restore adaptive functioning, provide symptom relief, encourage expression of thoughts and feelings, identify triggers, recognize patterns of behavior, and provide support.      Plan   Plan & Goals     Continue to pursue all treatment options with providers  Engage in some activity that represents self care      Patient acknowledged and verbally consented to  continue working toward resolving current treatment plan goals and was educated on the importance of participation in the therapeutic process.  Patient will remain compliant with medication regimen as prescribed. Discuss any medication side effects, questions or concerns with prescribing provider.  Call 911 or present to the nearest emergency room in an emergency situation.  National Suicide Prevention Lifeline: Call 988. The Lifeline provides 24/7, free and confidential support for people in distress, prevention and crisis resources.    Return in about 3 weeks (around 8/15/2023).    ____________________  This document has been electronically signed by Trudi Almeida LCSW  July 25, 2023 12:36 EDT    Part of this note may be an electronic transcription/translation of spoken language to printed text using the Dragon Dictation System.

## 2023-07-26 ENCOUNTER — TELEPHONE (OUTPATIENT)
Dept: NEUROSURGERY | Facility: CLINIC | Age: 53
End: 2023-07-26
Payer: COMMERCIAL

## 2023-07-26 DIAGNOSIS — M47.817 SPONDYLOSIS OF LUMBOSACRAL REGION WITHOUT MYELOPATHY OR RADICULOPATHY: Primary | ICD-10-CM

## 2023-07-26 NOTE — TELEPHONE ENCOUNTER
Patient left a voicemail that she was notified by PT that her insurance does not cover dry needling. She has tried regular PT in the past, and was not helpful. She would like to know if you have any other recommendations?

## 2023-07-27 NOTE — TELEPHONE ENCOUNTER
Patient returned call and stated she would like the pain management referral for [facility]. I have created referral and they will contact patient to set up appointment.

## 2023-08-14 ENCOUNTER — TELEMEDICINE (OUTPATIENT)
Dept: PSYCHIATRY | Facility: CLINIC | Age: 53
End: 2023-08-14
Payer: COMMERCIAL

## 2023-08-14 DIAGNOSIS — F33.1 MAJOR DEPRESSIVE DISORDER, RECURRENT EPISODE, MODERATE: Primary | ICD-10-CM

## 2023-08-14 DIAGNOSIS — F41.1 GENERALIZED ANXIETY DISORDER: ICD-10-CM

## 2023-08-14 DIAGNOSIS — F51.05 INSOMNIA DUE TO MENTAL DISORDER: ICD-10-CM

## 2023-08-14 NOTE — PROGRESS NOTES
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 11/27/2022 152  140 - 450 10*3/mm3 Final    Glucose 11/27/2022 128 (H)  65 - 99 mg/dL Final    BUN 11/27/2022 11  6 - 20 mg/dL Final    Creatinine 11/27/2022 0.68  0.57 - 1.00 mg/dL Final    Sodium 11/27/2022 139  136 - 145 mmol/L Final    Potassium 11/27/2022 4.5  3.5 - 5.2 mmol/L Final    Chloride 11/27/2022 102  98 - 107 mmol/L Final    CO2 11/27/2022 27.2  22.0 - 29.0 mmol/L Final    Calcium 11/27/2022 9.9  8.6 - 10.5 mg/dL Final    Total Protein 11/27/2022 7.5  6.0 - 8.5 g/dL Final    Albumin 11/27/2022 4.20  3.50 - 5.20 g/dL Final    ALT (SGPT) 11/27/2022 24  1 - 33 U/L Final    AST (SGOT) 11/27/2022 20  1 - 32 U/L Final    Alkaline Phosphatase 11/27/2022 109  39 - 117 U/L Final    Total Bilirubin 11/27/2022 0.5  0.0 - 1.2 mg/dL Final    Globulin 11/27/2022 3.3  gm/dL Final    A/G Ratio 11/27/2022 1.3  g/dL Final    BUN/Creatinine Ratio 11/27/2022 16.2  7.0 - 25.0 Final    Anion Gap 11/27/2022 9.8  5.0 - 15.0 mmol/L Final    eGFR 11/27/2022 103.6  >60.0 mL/min/1.73 Final    National Kidney Foundation and American Society of Nephrology (ASN) Task Force recommended calculation based on the Chronic Kidney Disease Epidemiology Collaboration (CKD-EPI) equation refit without adjustment for race.   Admission on 09/06/2022, Discharged on 09/06/2022   Component Date Value Ref Range  Status    QT Interval 09/06/2022 366  ms Final    QT Interval 09/06/2022 407  ms Corrected    Glucose 09/06/2022 119 (H)  65 - 99 mg/dL Final    BUN 09/06/2022 12  6 - 20 mg/dL Final    Creatinine 09/06/2022 0.53 (L)  0.57 - 1.00 mg/dL Final    Sodium 09/06/2022 139  136 - 145 mmol/L Final    Potassium 09/06/2022 3.6  3.5 - 5.2 mmol/L Final    Chloride 09/06/2022 103  98 - 107 mmol/L Final    CO2 09/06/2022 24.4  22.0 - 29.0 mmol/L Final    Calcium 09/06/2022 9.1  8.6 - 10.5 mg/dL Final    Total Protein 09/06/2022 7.7  6.0 - 8.5 g/dL Final    Albumin 09/06/2022 4.20  3.50 - 5.20 g/dL Final    ALT (SGPT) 09/06/2022 18  1 - 33 U/L Final    AST (SGOT) 09/06/2022 19  1 - 32 U/L Final    Alkaline Phosphatase 09/06/2022 120 (H)  39 - 117 U/L Final    Total Bilirubin 09/06/2022 0.3  0.0 - 1.2 mg/dL Final    Globulin 09/06/2022 3.5  gm/dL Final    A/G Ratio 09/06/2022 1.2  g/dL Final    BUN/Creatinine Ratio 09/06/2022 22.6  7.0 - 25.0 Final    Anion Gap 09/06/2022 11.6  5.0 - 15.0 mmol/L Final    eGFR 09/06/2022 110.1  >60.0 mL/min/1.73 Final    National Kidney Foundation and American Society of Nephrology (ASN) Task Force recommended calculation based on the Chronic Kidney Disease Epidemiology Collaboration (CKD-EPI) equation refit without adjustment for race.    Lipase 09/06/2022 36  13 - 60 U/L Final    proBNP 09/06/2022 107.4  0.0 - 900.0 pg/mL Final    Magnesium 09/06/2022 1.8  1.6 - 2.6 mg/dL Final    Extra Tube 09/06/2022 11   Final    Extra Tube 09/06/2022 11   Final    Extra Tube 09/06/2022 11   Final    Extra Tube 09/06/2022 11   Final    Extra Tube 09/06/2022 Hold for add-ons.   Final    Auto resulted.    Extra Tube 09/06/2022 Hold for add-ons.   Final    Auto resulted.    WBC 09/06/2022 10.40  3.40 - 10.80 10*3/mm3 Final    RBC 09/06/2022 5.00  3.77 - 5.28 10*6/mm3 Final    Hemoglobin 09/06/2022 14.0  12.0 - 15.9 g/dL Final    Hematocrit 09/06/2022 41.8  34.0 - 46.6 % Final    MCV 09/06/2022 83.6  79.0 - 97.0 fL  Final    MCH 09/06/2022 28.0  26.6 - 33.0 pg Final    MCHC 09/06/2022 33.5  31.5 - 35.7 g/dL Final    RDW 09/06/2022 13.8  12.3 - 15.4 % Final    RDW-SD 09/06/2022 42.0  37.0 - 54.0 fl Final    MPV 09/06/2022 13.2 (H)  6.0 - 12.0 fL Final    Platelets 09/06/2022 141  140 - 450 10*3/mm3 Final    Neutrophil % 09/06/2022 66.0  42.7 - 76.0 % Final    Lymphocyte % 09/06/2022 24.9  19.6 - 45.3 % Final    Monocyte % 09/06/2022 6.3  5.0 - 12.0 % Final    Eosinophil % 09/06/2022 1.8  0.3 - 6.2 % Final    Basophil % 09/06/2022 0.8  0.0 - 1.5 % Final    Immature Grans % 09/06/2022 0.2  0.0 - 0.5 % Final    Neutrophils, Absolute 09/06/2022 6.87  1.70 - 7.00 10*3/mm3 Final    Lymphocytes, Absolute 09/06/2022 2.59  0.70 - 3.10 10*3/mm3 Final    Monocytes, Absolute 09/06/2022 0.65  0.10 - 0.90 10*3/mm3 Final    Eosinophils, Absolute 09/06/2022 0.19  0.00 - 0.40 10*3/mm3 Final    Basophils, Absolute 09/06/2022 0.08  0.00 - 0.20 10*3/mm3 Final    Immature Grans, Absolute 09/06/2022 0.02  0.00 - 0.05 10*3/mm3 Final    nRBC 09/06/2022 0.0  0.0 - 0.2 /100 WBC Final    Troponin I 09/06/2022 0.00  0.00 - 0.08 ng/mL Final    Serial Number: [id number]  [id number]    Troponin I 09/06/2022 0.00  0.00 - 0.08 ng/mL Final    Serial Number: [id number]  [id number]       EKG Results:  No orders to display       Imaging Results:  CT Angiogram Neck    Result Date: 11/26/2022    1. No hemodynamically significant stenosis of the major cervical arteries, as above 2. Heterogeneous appearance of the thyroid may be secondary to a history of thyroiditis and or small nodules. 3. Multiple cervical lymph nodes bilaterally at the upper limit of normal for size.     Manolo Vigil M.D.       Electronically Signed and Approved By: Manolo Vigil M.D. on 11/26/2022 at 8:50             MRI Brain Without Contrast    Result Date: 1/10/2023   Scattered foci of increased T2 and FLAIR signal consistent with chronic microvascular ischemia.  No significant change.  No  acute intracranial abnormality.      EMILI WILSON MD       Electronically Signed and Approved By: EMILI WILSON MD on 1/10/2023 at 1:06             MRI Brain Without Contrast    Result Date: 11/26/2022    1. No acute infarction or intracranial hemorrhage 2. Mild signal abnormality in the cerebral white matter , nonspecific in appearance but most likely representing small vessel ischemic disease in a patient of this age. 3. Prominent mucosal inflammatory changes in the bilateral ethmoid and right maxillary sinuses.      Manolo Vigil M.D.       Electronically Signed and Approved By: Manolo Vigil M.D. on 11/26/2022 at 14:59             XR Chest 1 View    Result Date: 11/26/2022    1. No acute cardiopulmonary disease       Manolo Vigil M.D.       Electronically Signed and Approved By: Manolo Vigil M.D. on 11/26/2022 at 8:32             XR Pelvis 1 or 2 View    Result Date: 11/26/2022    1. No acute finding 2. Mild bilateral hip osteoarthritis      Manolo Vigil M.D.       Electronically Signed and Approved By: Manolo Vigil M.D. on 11/26/2022 at 13:31             MRI Hip Right Arthrogram    Result Date: 1/13/2023    1. Mild bilateral hip osteoarthritis 2. No internal derangement of the right hip joint     Manolo Vigil M.D.       Electronically Signed and Approved By: Manolo Vigil M.D. on 1/13/2023 at 14:21             CT Head Without Contrast Stroke Protocol    Result Date: 11/26/2022    1. Mild small vessel ischemic changes in the white matter     Manolo Vigil M.D.       Electronically Signed and Approved By: Manolo Vigil M.D. on 11/26/2022 at 7:49             CT Angiogram Head w AI Analysis of LVO    Result Date: 11/26/2022    1. Relatively mild atherosclerotic calcification involving the intracranial segments of both internal carotid arteries. 2. No hemodynamically significant stenosis of the intracranial vasculature.     Manolo Vigil M.D.       Electronically Signed and Approved By: Manolo Vigil M.D. on  11/26/2022 at 9:18             CT CEREBRAL PERFUSION WITH & WITHOUT CONTRAST    Result Date: 11/26/2022    1. Study within normal limits.      Manolo Vigil M.D.       Electronically Signed and Approved By: Manolo Vigil M.D. on 11/26/2022 at 7:57             [place] Contrast Injection CT / MRI    Result Date: 1/13/2023   Right hip Arthrogram was performed without complication, patient tolerated procedure.  The patient was instructed to obtain follow up care from the referring physician.   The above procedure was directly supervised by Dr. Vigli.   LINDSEY HUDSON       Electronically Signed and Approved By: Manolo Vigil M.D. on 1/13/2023 at 12:08                 Assessment & Plan   Diagnoses and all orders for this visit:    1. Major depressive disorder, recurrent episode, moderate (Primary)    2. Generalized anxiety disorder    3. Insomnia due to mental disorder      Continue mirtazapine and duloxetine to target depression and anxiety. Continue hydroxyzine as needed for anxiety. Sleep hygiene education for insomnia. Supportive psychotherapy with goal to strengthen defenses, promote problems solving, restore adaptive functioning and provide symptom relief. The therapeutic alliance was strengthened to encourage the patient to express their thoughts and feelings. Esteem building was enhanced through praise, reassurance, normalizing and encouragement. Stressors: Family Related. Coping skills were enhanced to build distress tolerance skills and emotional regulation. Coping skills utilized: Positive Distraction. Allowed patient to freely discuss issues without interruption or judgement with unconditional positive regard, active listening skills, and empathy. Current goal: Practice stress management techniques. Provided a safe, confidential environment to facilitate the development of a positive therapeutic relationship and encourage open, honest communication. Assisted patient in identifying risk factors which would indicate  the need for higher level of care including thoughts to harm self or others and/or self-harming behavior and encouraged patient to contact this office, call 911, or present to the nearest emergency room should any of these events occur. Assisted patient in processing session content; acknowledged and normalized patient's thoughts, feelings, and concerns by utilizing a person-centered approach in efforts to build appropriate rapport and a positive therapeutic relationship with open and honest communication. Patient given education on medication side effects, diagnosis/illness and relapse symptoms.  Plan to continue supportive psychotherapy in next appointment to provide symptom relief. At least 20 minutes of coping skill utilization recommended per day. 16 minutes of supportive psychotherapy. 4 weeks    Diagnoses: as above  Symptoms: as above  Functional status: good  Mental Status Exam: as above     Treatment plan: medication management and supportive psychotherapy  Prognosis: good  Progress: Continued Improvement    Visit Diagnoses:    ICD-10-CM ICD-9-CM   1. Major depressive disorder, recurrent episode, moderate  F33.1 296.32   2. Generalized anxiety disorder  F41.1 300.02   3. Insomnia due to mental disorder  F51.05 300.9     327.02         PLAN:  Safety: No acute safety concerns.   Therapy: Trudi Almeida  Risk Assessment: Risk of self-harm acutely is moderate.  Risk factors include anxiety disorder, mood disorder, and recent psychosocial stressors (pandemic). Protective factors include no family history, denies access to guns/weapons, no present SI, no history of suicide attempts or self-harm in the past, minimal AODA, healthcare seeking, future orientation, willingness to engage in care.  Risk of self-harm chronically is also moderate, but could be further elevated in the event of treatment noncompliance and/or AODA.  Medications: Continue mirtazapine 7.5mg po qhs to target depression and anxiety. Risks,  benefits, alternatives discussed with patient including GI upset, sedation, dizziness with falls risk, increased appetite.  After discussion of these risks and benefits, the patient voiced understanding and agreed to proceed.Continue hydroxyzine 25mg po qday prn anxiety. Risks, benefits, alternatives discussed with patient including sedation, dizziness, fall risk, GI upset, and risk of increased CNS depression and elevated heart rate if taken with other antihistamines.  After discussion of these risks and benefits, the patient voiced understanding and agreed to proceed. Continue duloxetine 30mg po qday to target depression and anxiety. Discussed all risks, benefits, alternatives, and side effects of Duloxetine including but not limited to GI upset, sexual dysfunction, bleeding risk, seizure risk, weight loss, insomnia, diaphoresis, drowsiness, headache, dizziness, fatigue, activation of kimberlee or hypomania, increased fragility fracture risk, hyponatremia, increased BP, hepatotoxicity, ocular effects, withdrawal syndrome following abrupt discontinuation, serotonin syndrome, and activation of suicidal ideation and behavior.  Pt educated on the need to practice safe sex while taking this med. Discussed the need for pt to immediately call the office for any new or worsening symptoms, such as worsening depression; feeling nervous or restless; suicidal thoughts or actions; or other changes changes in mood or behavior, and all other concerns. Pt educated on med compliance and the risks of suddenly stopping this medication or missing doses. Pt verbalized understanding and is agreeable to taking Duloxetine. Addressed all questions and concerns.  Labs/studies: No labs/studies ordered at this time  Follow-up: 4 weeks    Patient screened positive for depression based on a PHQ-9 score of  on . Follow-up recommendations include: Suicide Risk Assessment performed.         TREATMENT PLAN/GOALS: Continue supportive psychotherapy  efforts and medications as indicated. Treatment and medication options discussed during today's visit. Patient ackowledged and verbally consented to continue with current treatment plan and was educated on the importance of compliance with treatment and follow-up appointments.      MEDICATION ISSUES:  OSIEL reviewed as expected.  Discussed medication options and treatment plan of prescribed medication as well as the risks, benefits, and side effects including potential falls, possible impaired driving and metabolic adversities among others. Patient is agreeable to call the office with any worsening of symptoms or onset of side effects. Patient is agreeable to call 911 or go to the nearest ER should he/she begin having SI/HI. No medication side effects or related complaints today.     MEDS ORDERED DURING VISIT:  No orders of the defined types were placed in this encounter.      Return in about 4 weeks (around 9/11/2023) for Next scheduled follow up.         This document has been electronically signed by ZAYNAB Sinclair  August 14, 2023 14:41 EDT      Part of this note may be an electronic transcription/translation of spoken language to printed text using the Dragon Dictation System.

## 2023-08-15 ENCOUNTER — OFFICE VISIT (OUTPATIENT)
Dept: PSYCHIATRY | Facility: CLINIC | Age: 53
End: 2023-08-15
Payer: COMMERCIAL

## 2023-08-15 DIAGNOSIS — F32.A ANXIETY AND DEPRESSION: Primary | ICD-10-CM

## 2023-08-15 DIAGNOSIS — F41.9 ANXIETY AND DEPRESSION: Primary | ICD-10-CM

## 2023-08-15 NOTE — PSYCHOTHERAPY NOTE
Bj with patient today   Rabbits,  cats   Sleep okay,  feel a little better,   still depression,   Can't handle noises,   noises bother me,       Baby brother came back 3 yo, 5 months     Working for [employer]     I'm hiding the proud a little bit     Sometimes hiding  think about it   Dreams    Too much to myself,  noises      Brother here, the was in the war   Discussed history of trauma, related that current symptoms may be result of past experiences being pushed away over many years.     No news on disability

## 2023-08-15 NOTE — PROGRESS NOTES
Progress Note    Date: 08/15/2023  Time In: 10:45  Time Out: 11:39    Patient Legal Name: Zoya Crook  Patient Age: 52 y.o.    CHIEF COMPLAINT: depression and anxiety     Subjective   History of Present Illness           Zoya is a 52 y.o. female who presents today as a follow-up for continued psychotherapy. Our most recent therapy session was on 07-25-23. At that time goals were to  Continue to pursue all treatment options with providers  Engage in some activity that represents self care      Assessment    Mental Status Exam     Appearance: good hygiene and dressed appropriately for the weather  Behavior: calm  Cooperation:  engaged, cooperative, attentive, and friendly  Eye Contact:  good  Affect:  congruent and sad  Mood: expressive and irritable  Speech: responsive and [place] accent   Thought Process:  goal-oriented  Thought Content: appropriate  Suicidal: denies  Homicidal:  denies  Hallucinations:  denies  Memory:  intact  Orientation:  person, place, time, and situation  Reliability:  reliable  Insight:  good  Judgment:  good     Clinical Intervention       ICD-10-CM ICD-9-CM   1. Anxiety and depression  F41.9 300.00    F32.A 311        Zoya reports that she is feeling some relief from her symptoms. She does report ongoing irritability at times, mk by escaping into a quiet environment. Discussed her reaction to noises as well as nightmares and dreams in the context of trauma. She discussed events of the past and seems to have some understanding that this may be related to current symptoms. Self care activities continue to be a challenge.   Individual psychotherapy was provided utilizing solution focused  techniques to provide symptom relief, encourage expression of thoughts and feelings, support self-esteem, manage stress, identify triggers, recognize patterns of behavior, identify strengths, and provide support.      Plan   Plan & Goals     Psychotherapy for symptom relief  Further discuss past trauma at  next session     Patient acknowledged and verbally consented to continue working toward resolving current treatment plan goals and was educated on the importance of participation in the therapeutic process.  Patient will remain compliant with medication regimen as prescribed. Discuss any medication side effects, questions or concerns with prescribing provider.  Call 911 or present to the nearest emergency room in an emergency situation.  National Suicide Prevention Lifeline: Call 988. The Lifeline provides 24/7, free and confidential support for people in distress, prevention and crisis resources.    Return in about 3 weeks (around 9/5/2023).    ____________________  This document has been electronically signed by Trudi Almeida LCSW  August 15, 2023 11:46 EDT    Part of this note may be an electronic transcription/translation of spoken language to printed text using the Dragon Dictation

## 2023-08-16 ENCOUNTER — HOSPITAL ENCOUNTER (EMERGENCY)
Facility: HOSPITAL | Age: 53
Discharge: HOME | End: 2023-08-16
Attending: EMERGENCY MEDICINE
Payer: COMMERCIAL

## 2023-08-16 VITALS
BODY MASS INDEX: 26.74 KG/M2 | DIASTOLIC BLOOD PRESSURE: 89 MMHG | HEIGHT: 65 IN | HEART RATE: 77 BPM | OXYGEN SATURATION: 100 % | SYSTOLIC BLOOD PRESSURE: 151 MMHG | TEMPERATURE: 98.4 F | RESPIRATION RATE: 18 BRPM | WEIGHT: 160.5 LBS

## 2023-08-16 DIAGNOSIS — J06.9 VIRAL UPPER RESPIRATORY ILLNESS: Primary | ICD-10-CM

## 2023-08-16 LAB
FLUAV AG NPH QL: NEGATIVE
FLUBV AG NPH QL IA: NEGATIVE
S PYO AG THROAT QL: NEGATIVE
SARS-COV-2 RNA RESP QL NAA+PROBE: NOT DETECTED

## 2023-08-16 PROCEDURE — 87804 INFLUENZA ASSAY W/OPTIC: CPT

## 2023-08-16 PROCEDURE — 87081 CULTURE SCREEN ONLY: CPT | Performed by: EMERGENCY MEDICINE

## 2023-08-16 PROCEDURE — 87635 SARS-COV-2 COVID-19 AMP PRB: CPT | Performed by: EMERGENCY MEDICINE

## 2023-08-16 PROCEDURE — 99282 EMERGENCY DEPT VISIT SF MDM: CPT

## 2023-08-16 PROCEDURE — 87880 STREP A ASSAY W/OPTIC: CPT | Performed by: EMERGENCY MEDICINE

## 2023-08-16 RX ORDER — BROMPHENIRAMINE MALEATE, PSEUDOEPHEDRINE HYDROCHLORIDE, AND DEXTROMETHORPHAN HYDROBROMIDE 2; 30; 10 MG/5ML; MG/5ML; MG/5ML
5 SYRUP ORAL 4 TIMES DAILY PRN
Qty: 118 ML | Refills: 0 | Status: SHIPPED | OUTPATIENT
Start: 2023-08-16

## 2023-08-16 NOTE — Clinical Note
[facility] EMERGENCY ROOM  [address]  [place] [address]  Phone: [phone]    Zoya Crook was seen and treated in our emergency department on 8/16/2023.  She may return to work on 08/17/2023.         Thank you for choosing [facility].    Marielena Taylor APRN

## 2023-08-16 NOTE — DISCHARGE INSTRUCTIONS
Follow up with your PCP or you may give Dr. Aponte a call regarding the enlarged area of your neck.
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
## 2023-08-18 LAB — BACTERIA SPEC AEROBE CULT: NORMAL

## 2023-08-22 ENCOUNTER — TREATMENT (OUTPATIENT)
Dept: PHYSICAL THERAPY | Facility: CLINIC | Age: 53
End: 2023-08-22
Payer: COMMERCIAL

## 2023-08-22 DIAGNOSIS — M70.61 TROCHANTERIC BURSITIS OF BOTH HIPS: ICD-10-CM

## 2023-08-22 DIAGNOSIS — M70.62 TROCHANTERIC BURSITIS OF BOTH HIPS: ICD-10-CM

## 2023-08-22 DIAGNOSIS — G89.29 CHRONIC MIDLINE LOW BACK PAIN, UNSPECIFIED WHETHER SCIATICA PRESENT: Primary | ICD-10-CM

## 2023-08-22 DIAGNOSIS — M54.50 CHRONIC MIDLINE LOW BACK PAIN, UNSPECIFIED WHETHER SCIATICA PRESENT: Primary | ICD-10-CM

## 2023-08-22 DIAGNOSIS — M47.817 SPONDYLOSIS OF LUMBOSACRAL REGION WITHOUT MYELOPATHY OR RADICULOPATHY: ICD-10-CM

## 2023-08-22 PROCEDURE — 97161 PT EVAL LOW COMPLEX 20 MIN: CPT | Performed by: PHYSICAL THERAPIST

## 2023-08-22 NOTE — PROGRESS NOTES
Physical Therapy Initial Evaluation and Plan of Care  [address] [facility], [address] [place] [address]        Patient: Zoya Crook   : 1970  Diagnosis/ICD-10 Code:  Chronic midline low back pain, unspecified whether sciatica present [M54.50, G89.29]  Referring practitioner: Rody Flores*  Date of Initial Visit: 2023  Today's Date: 2023  Patient seen for 1 sessions           Subjective Questionnaire: JACKSON:       Subjective Evaluation    History of Present Illness  Mechanism of injury: Pt reports that she has had lower back pain and bilateral groin/lateral hip pain for several years with no FRANNIE.  Pt reports that she has had R hip injections and lumbar epidural with no relief.  Pt reports that walking, pushing/pulling, prolonged sitting, forward flexion and sleeping increases her pain.  Pt reports that she intermittently she has bilaterally numbness in feet.  Pt reports that she is taking no pain medication at this time.  Pt reports that she is not currently working.  Pt states that she lives a primarily sedentary life due to increased pain.  Pt reports that she is tried PT in the past with no improvements.    Pt reports that she had a stroke .     Pain  Current pain ratin  At best pain ratin  At worst pain rating: 10  Quality: dull ache, discomfort, tight, sharp and pulling  Aggravating factors: ambulation, lifting, prolonged positioning, repetitive movement, sleeping, standing and movement  Progression: worsening    Patient Goals  Patient goals for therapy: decreased pain, increased motion, increased strength and return to sport/leisure activities           Objective          Static Posture   General Observations  Flexed.     Head  Forward.    Shoulders  Elevated and rounded.    Scapulae  Right winging, left protracted and right protracted.    Lumbar Spine   Flattened.     Palpation   Left   Tenderness of the erector spinae and lumbar paraspinals.   Trigger point to erector  spinae and lumbar paraspinals.     Right Tenderness of the erector spinae and lumbar paraspinals.   Trigger point to erector spinae and lumbar paraspinals.     Tenderness     Left Hip   Tenderness in the greater trochanter.     Right Hip   Tenderness in the greater trochanter.     Active Range of Motion   Left Hip   Flexion: 88 degrees with pain  Abduction: WFL    Right Hip   Flexion: 91 degrees with pain  Abduction: WFL    Additional Active Range of Motion Details  Lumbar AROM: (all motions painful)  Flexion: 90% limited  Extension: to neutral  Rotation: 75% limited bilaterally  Lateral flexion: 75% limited bilaterally      Strength/Myotome Testing     Left Hip   Planes of Motion   Flexion: 4-  Left hip extension strength: unable to test due to pain.  Abduction: 4-    Right Hip   Planes of Motion   Flexion: 4-  Right hip extension strength: unable to test due to pain.  Abduction: 4-    Left Knee   Flexion: 4+  Extension: 4+    Right Knee   Flexion: 4  Extension: 4+    Left Ankle/Foot   Dorsiflexion: 5    Right Ankle/Foot   Dorsiflexion: 5    Muscle Activation     Additional Muscle Activation Details  Poor PPT activation with max cues    Tests     Lumbar     Left   Negative passive SLR and quadrant.     Right   Positive passive SLR.   Negative quadrant.     Left Hip   Negative TRISTIAN and scour.     Right Hip   Negative TRISTIAN and scour.     Ambulation     Observational Gait   Gait: antalgic   Decreased walking speed, left step length and right step length.   Base of support: normal    Additional Observational Gait Details  Antalgic gait on R LE with decreased stance time and L lateral lean to advance R LE.      General Comments     Lumbar Comments  Bilateral hamstring, iliopsoas and piriformis tightness noted  Light touch sensation normal in bilateral lower extremities  Pt limited in lumbar and hip testing due to pain         Assessment & Plan       Assessment  Impairments: abnormal or restricted ROM, activity  intolerance, impaired physical strength, lacks appropriate home exercise program and pain with function   Functional limitations: lifting, sleeping, walking, uncomfortable because of pain, sitting, standing and unable to perform repetitive tasks   Assessment details: Patient presents with signs/symptoms consistent with chronic lower back pain and bilateral trochanteric bursitis including decreased lumbar ROM, bilateral hip and core weakness, positive SLR testing, lumbar and bilateral greater trochanter tenderness and reports of pain limiting function during ADLs as shown on the JACKSON. Pt unable to tolerate exercises or stretching and is limited in testing due to pain and muscle spasms.  Due to lack of tolerance of all therapy activities the patient would not benefit from PT to improve pain or function during ADLs.  Due to this the pt was given a HEP and will be discharged today.  Pt is comfortable with this plan and will return to MD for other pain management options.    Goals  Plan Goals: Goal 1:  The patient will be independent with HEP.  Status: met    Plan  Therapy options: will not be seen for skilled therapy services  Treatment plan discussed with: patient      Timed:         Manual Therapy:    0     mins  16146;     Therapeutic Exercise:    0     mins  05732;     Neuromuscular Jess:    0    mins  05318;    Therapeutic Activity:     0     mins  35142;     Gait Trainin     mins  75439;     Ultrasound:     0     mins  58472;    Ionto                               0    mins   29562  Self-care  __0__ mins 46119    Un-Timed:  Electrical Stimulation:    0     mins  12540 ( );  Traction     0     mins 75897  Low Eval     35     Mins  29092  Mod Eval     0     Mins  22507  High Eval                       0     Mins  90103  Hot pack     0     Mins    Cold pack                       0     Mins      Timed Treatment:   0   mins   Total Treatment:     35   mins    PT SIGNATURE: Marisela Ramirez  PT      Electronically signed 8/22/2023  [place] License: [id number]    Initial Certification    Certification Period: 8/22/2023 thru 11/19/2023  NPI: [id number]  I certify that the therapy services are furnished while this patient is under my care.  The services outlined above are required by this patient, and will be reviewed every 90 days.     PHYSICIAN: Rody Nixon, APRN      DATE:     Please sign and return via fax to [phone].. Thank you, [facility].

## 2023-08-30 ENCOUNTER — OFFICE VISIT (OUTPATIENT)
Dept: NEUROSURGERY | Facility: CLINIC | Age: 53
End: 2023-08-30
Payer: COMMERCIAL

## 2023-08-30 VITALS
HEIGHT: 65 IN | BODY MASS INDEX: 26.79 KG/M2 | SYSTOLIC BLOOD PRESSURE: 127 MMHG | HEART RATE: 62 BPM | WEIGHT: 160.8 LBS | DIASTOLIC BLOOD PRESSURE: 62 MMHG

## 2023-08-30 DIAGNOSIS — M54.41 CHRONIC MIDLINE LOW BACK PAIN WITH BILATERAL SCIATICA: ICD-10-CM

## 2023-08-30 DIAGNOSIS — M54.42 CHRONIC MIDLINE LOW BACK PAIN WITH BILATERAL SCIATICA: ICD-10-CM

## 2023-08-30 DIAGNOSIS — M47.817 SPONDYLOSIS OF LUMBOSACRAL REGION WITHOUT MYELOPATHY OR RADICULOPATHY: Primary | ICD-10-CM

## 2023-08-30 DIAGNOSIS — M70.61 TROCHANTERIC BURSITIS OF RIGHT HIP: ICD-10-CM

## 2023-08-30 DIAGNOSIS — M46.1 INFLAMMATION OF RIGHT SACROILIAC JOINT: ICD-10-CM

## 2023-08-30 DIAGNOSIS — G89.29 CHRONIC MIDLINE LOW BACK PAIN WITH BILATERAL SCIATICA: ICD-10-CM

## 2023-08-30 RX ORDER — AMLODIPINE BESYLATE 10 MG/1
TABLET ORAL
COMMUNITY

## 2023-08-30 RX ORDER — TRAMADOL HYDROCHLORIDE 50 MG/1
50 TABLET ORAL EVERY 8 HOURS PRN
Qty: 21 TABLET | Refills: 0 | Status: SHIPPED | OUTPATIENT
Start: 2023-08-30

## 2023-08-30 RX ORDER — LIDOCAINE 50 MG/G
PATCH TOPICAL
COMMUNITY

## 2023-08-30 NOTE — PATIENT INSTRUCTIONS
-Pain management for lumbar RFA  -Tramadol 50 mg every 8 hours as needed for pain  -Smoking cessation  -Follow up as needed

## 2023-08-30 NOTE — PROGRESS NOTES
Chief Complaint  Follow-up (Patient has not had dry needling due to insurance. Pt states PT says they do not want to see her any more, she had a referral for pain management but they never called her.)    Subjective          Zoya Crook who is a 52 y.o. year old female who presents to [facility] for follow up of low back pain.     History of Present Illness  At her last visit she was referred to physical therapy for dry needling. Unfortunately her insurance would not pay for dry needling and she did not proceed with this. Physical therapy did some stretches and exercises with her though these did not provide much benefit.     She called into our office and we referred her to pain management to evaluate for lumbar RFA trial. She never received a phone call from pain management to follow up with them.    She was started on Cymbalta.Unfortunately that is not providing her significant relief.     Her pain is primarily in the right side of the low back and into the hip. She does have pain into the legs, numbness and tingling in the toes.    Interval History   Zoya Crook who is a 52 y.o. year old female who presents to [facility] for worsening pain.      History of Present Illness  Pt received TFESI at L4/5 and L5/S1 with pain management, which did not provide her much relief. She was advised to follow up in our office.      Her pain continues to be primarily in the back and hips. Will radiate into the groin. She denies pain into the legs. Walking and standing make her pain worse. She has stiffness in back and hips.      She has tried pain patches. She has tried Flexeril, Tylenol. She is unable to take NSAIDs due to taking Eliquis.      Her pain is quite severe.       Interval History 4/10/23     Zoya Crook who is a 52 y.o. year old female who presents to [facility] for evaluation of lumbar  "spine.        The patient complains of pain located in the lumbar spine.  Patients states the pain has been present for several years.  The pain came on gradually.  Pain is primarily on the right side into the hip. The pain scale level is 9.  The pain does not radiate into the leg.  The pain is constant and waxing/waning and described as sharp, aching and throbbing.  The pain is worse at no particular time of day. Patient states prolonged standing, prolonged walking, bending, twisting and lifting makes the pain worse.  Raising the legs to walk up stairs increases her pain. Patient states rest makes the pain better.     Associated Symptoms Include: Denies numbness and tingling  Conservative Interventions Include: Physical Therapy that was not very effective. She was first evaluated for her hip. She had an injection the hip which provided a short duration of relief. She has never had injections in the back. She is taking tylenol which does not help. Flexeril does not help.      Was this the result of an injury or accident?: No     History of Previous Spinal Surgery?: No     Nicotine use: smoker  (1 ppd)     BMI: Body mass index is 26.91 kg/mý.     Recent Interventions: TFESI L4/5 and L5/S1, PT, Cymbalta        Review of Systems   Musculoskeletal:  Positive for arthralgias, back pain and myalgias.   All other systems reviewed and are negative.     Objective   Vital Signs:   /62 (BP Location: Left arm, Patient Position: Sitting, Cuff Size: Adult)   Pulse 62   Ht 165.1 cm (65\")   Wt 72.9 kg (160 lb 12.8 oz)   BMI 26.76 kg/mý       Physical Exam  Vitals reviewed.   Constitutional:       Appearance: Normal appearance.   Musculoskeletal:      Lumbar back: Tenderness present. Negative right straight leg raise test and negative left straight leg raise test.      Right hip: Tenderness present. Normal range of motion.      Left hip: No tenderness. Normal range of motion.   Neurological:      Mental Status: She is " alert and oriented to person, place, and time.      Motor: Motor strength is normal.      Gait: Gait is intact.      Deep Tendon Reflexes:      Reflex Scores:       Patellar reflexes are 2+ on the right side and 2+ on the left side.       Achilles reflexes are 2+ on the right side and 2+ on the left side.     Neurologic Exam     Mental Status   Oriented to person, place, and time.   Level of consciousness: alert    Motor Exam   Muscle bulk: normal  Overall muscle tone: normal    Strength   Strength 5/5 throughout.     Sensory Exam   Light touch normal.     Gait, Coordination, and Reflexes     Gait  Gait: normal    Reflexes   Right patellar: 2+  Left patellar: 2+  Right achilles: 2+  Left achilles: 2+  Right ankle clonus: absent  Left ankle clonus: absent     Result Review :       Data reviewed : Radiologic studies MRI Lumbar Spine on 3/6/23 at [facility] personally reviewed. Degenerative changes in the lower lumbar spine, most significant at L4/5 and L5/S1. At L4//5 there is severe right and moderately severe left neural foraminal narrowing, without significant spinal stenosis. At L5/S1 there is moderate spinal canal and severe foraminal stenosis.              Assessment and Plan    Diagnoses and all orders for this visit:    1. Spondylosis of lumbosacral region without myelopathy or radiculopathy (Primary)  -     Ambulatory Referral to Pain Management    2. Chronic midline low back pain with bilateral sciatica  -     traMADol (ULTRAM) 50 MG tablet; Take 1 tablet by mouth Every 8 (Eight) Hours As Needed for Severe Pain.  Dispense: 21 tablet; Refill: 0    3. Inflammation of right sacroiliac joint  -     Ambulatory Referral to Pain Management    4. Trochanteric bursitis of right hip  -     Ambulatory Referral to Pain Management    Will refer to pain management for lumbar RFA. We discussed that surgery would likely be lumbar fusion at L4/5 and L5/S1. She is a smoker.    We discussed the importance of smoking/nicotine  cessation. Smoking/nicotine use has multiple health risks. In particular related to the spine, nicotine increases the incidence of lower back pain, speeds up the progression of degenerative disc disease and dramatically reduces healing after spine surgery (particularly a fusion operation).     Her pain is quite severe. Will refer to pain management for medication management. Start Tramadol 50 mg every 8 hours as needed for pain.     She will follow up as needed.       Follow Up   Return if symptoms worsen or fail to improve.  Patient was given instructions and counseling regarding her condition or for health maintenance advice.       -Pain management for lumbar RFA  -Tramadol 50 mg every 8 hours as needed for pain  -Smoking cessation  -Follow up as needed

## 2023-09-01 NOTE — PROGRESS NOTES
Progress Note    Date: 09/05/2023  Time In: 11:07   Time Out: 12:00:    Patient Legal Name: Zoya Crook  Patient Age: 52 y.o.    CHIEF COMPLAINT: Depression     Subjective   History of Present Illness     Our last therapy session was on 08/15/23. At that time goals were Psychotherapy for symptom relief  Further discuss past trauma at next session     Assessment    Mental Status Exam     Appearance: good hygiene and dressed appropriately for the weather  Behavior: calm  Cooperation:  engaged, cooperative, attentive, and friendly  Eye Contact:  good  Affect:  congruent  Mood: expressive and anxious  Speech: responsive and talkative  Thought Process:  organized and goal-oriented  Thought Content: appropriate  Suicidal: denies  Homicidal:  denies  Hallucinations:  denies  Memory:  intact  Orientation:  person, place, time, and situation  Reliability:  reliable  Insight:  good  Judgment:  good     Clinical Intervention       ICD-10-CM ICD-9-CM   1. Anxiety and depression  F41.9 300.00    F32.A 311        Zoya is a 52 y.o. female who presents today as a follow-up for continued psychotherapy. Individual psychotherapy was provided utilizing solution focused  techniques to provide symptom relief, encourage expression of thoughts and feelings, increase acceptance, manage stress, identify triggers, recognize patterns of behavior, challenge negative thinking patterns, and provide support. Zoya reports some improvement in symptoms. She reports that she is forcing herself to do more around the house, isolating more. She continues to feel stressed and anxious at times when she is around a lot of activity and noise, needs to limit this. She would like to try to quit smoking, she is trying to cut back and we discussed that distraction can help. She continues to struggle with her health challenges. We discussed  how past experiences can provide a framework for current struggles. Discussed some past difficult times in      Plan   Plan  & Goals         Psychotherapy for symptom management  Continue effort at s moking cessation.   Patient acknowledged and verbally consented to continue working toward resolving current treatment plan goals and was educated on the importance of participation in the therapeutic process.  Patient will remain compliant with medication regimen as prescribed. Discuss any medication side effects, questions or concerns with prescribing provider.  Call 911 or present to the nearest emergency room in an emergency situation.  National Suicide Prevention Lifeline: Call 988. The Lifeline provides 24/7, free and confidential support for people in distress, prevention and crisis resources.  Crisis Text Line  Text HOME To 006498    Return in about 3 weeks (around 9/26/2023).    ____________________  This document has been electronically signed by Trudi Almeida LCSW  September 5, 2023 12:56 EDT    Part of this note may be an electronic transcription/translation of spoken language to printed text using the Dragon Dictation System.

## 2023-09-05 ENCOUNTER — OFFICE VISIT (OUTPATIENT)
Dept: PSYCHIATRY | Facility: CLINIC | Age: 53
End: 2023-09-05
Payer: COMMERCIAL

## 2023-09-05 DIAGNOSIS — F41.9 ANXIETY AND DEPRESSION: Primary | ICD-10-CM

## 2023-09-05 DIAGNOSIS — F32.A ANXIETY AND DEPRESSION: Primary | ICD-10-CM

## 2023-09-05 NOTE — PSYCHOTHERAPY NOTE
Trying to be up doing more,      Doing more, feel like I need to help  going to pain management next month   Tramadol  helps a little   Cymbalta maybe helping     Worse on days if I forget     Dght  doing good,  baby good     Can say at my limit,   Grandchildren birthdays close together,  15 people,     Get tired,    8 hours sleep     Mirian, she is good,   had bday party    Bosnia,   leaving, give ,e strength     Baby Casper -sweet    Clean house, be on my my feet,      got carrie cut   Cutting back on smoking     Distract self - try to smoke less

## 2023-09-11 ENCOUNTER — TELEMEDICINE (OUTPATIENT)
Dept: PSYCHIATRY | Facility: CLINIC | Age: 53
End: 2023-09-11
Payer: COMMERCIAL

## 2023-09-11 DIAGNOSIS — F41.1 GENERALIZED ANXIETY DISORDER: ICD-10-CM

## 2023-09-11 DIAGNOSIS — F33.1 MAJOR DEPRESSIVE DISORDER, RECURRENT EPISODE, MODERATE: Primary | ICD-10-CM

## 2023-09-11 DIAGNOSIS — F51.05 INSOMNIA DUE TO MENTAL DISORDER: ICD-10-CM

## 2023-09-18 ENCOUNTER — TRANSCRIBE ORDERS (OUTPATIENT)
Dept: ADMINISTRATIVE | Facility: HOSPITAL | Age: 53
End: 2023-09-18
Payer: COMMERCIAL

## 2023-09-18 DIAGNOSIS — E05.00 DIFFUSE TOXIC GOITER: ICD-10-CM

## 2023-09-18 DIAGNOSIS — E04.0 GOITER DIFFUSE: Primary | ICD-10-CM

## 2023-09-25 ENCOUNTER — TRANSCRIBE ORDERS (OUTPATIENT)
Dept: ADMINISTRATIVE | Facility: HOSPITAL | Age: 53
End: 2023-09-25

## 2023-09-25 ENCOUNTER — LAB (OUTPATIENT)
Dept: LAB | Facility: HOSPITAL | Age: 53
End: 2023-09-25
Payer: COMMERCIAL

## 2023-09-25 ENCOUNTER — HOSPITAL ENCOUNTER (OUTPATIENT)
Dept: ULTRASOUND IMAGING | Facility: HOSPITAL | Age: 53
Discharge: HOME | End: 2023-09-25
Payer: COMMERCIAL

## 2023-09-25 DIAGNOSIS — E05.00: Primary | ICD-10-CM

## 2023-09-25 DIAGNOSIS — E04.0 GOITER DIFFUSE: ICD-10-CM

## 2023-09-25 DIAGNOSIS — E05.00: ICD-10-CM

## 2023-09-25 DIAGNOSIS — E05.00 DIFFUSE TOXIC GOITER: ICD-10-CM

## 2023-09-25 DIAGNOSIS — E04.0 GOITER, SIMPLE: ICD-10-CM

## 2023-09-25 LAB
T3FREE SERPL-MCNC: 2.2 PG/ML (ref 2–4.4)
T4 FREE SERPL-MCNC: 0.63 NG/DL (ref 0.93–1.7)
TSH SERPL DL<=0.05 MIU/L-ACNC: 24.7 UIU/ML (ref 0.27–4.2)

## 2023-09-25 PROCEDURE — 84443 ASSAY THYROID STIM HORMONE: CPT

## 2023-09-25 PROCEDURE — 36415 COLL VENOUS BLD VENIPUNCTURE: CPT

## 2023-09-25 PROCEDURE — 84439 ASSAY OF FREE THYROXINE: CPT

## 2023-09-25 PROCEDURE — 76536 US EXAM OF HEAD AND NECK: CPT

## 2023-09-25 PROCEDURE — 85025 COMPLETE CBC W/AUTO DIFF WBC: CPT

## 2023-09-25 PROCEDURE — 84481 FREE ASSAY (FT-3): CPT

## 2023-09-25 PROCEDURE — 84445 ASSAY OF TSI GLOBULIN: CPT

## 2023-09-25 PROCEDURE — 86376 MICROSOMAL ANTIBODY EACH: CPT

## 2023-09-26 LAB
BASOPHILS # BLD AUTO: 0.1 10*3/MM3 (ref 0–0.2)
BASOPHILS NFR BLD AUTO: 0.8 % (ref 0–1.5)
DEPRECATED RDW RBC AUTO: 42 FL (ref 37–54)
EOSINOPHIL # BLD AUTO: 0.41 10*3/MM3 (ref 0–0.4)
EOSINOPHIL NFR BLD AUTO: 3.5 % (ref 0.3–6.2)
ERYTHROCYTE [DISTWIDTH] IN BLOOD BY AUTOMATED COUNT: 14.3 % (ref 12.3–15.4)
HCT VFR BLD AUTO: 38.8 % (ref 34–46.6)
HGB BLD-MCNC: 12.7 G/DL (ref 12–15.9)
LYMPHOCYTES # BLD AUTO: 3.54 10*3/MM3 (ref 0.7–3.1)
LYMPHOCYTES NFR BLD AUTO: 29.9 % (ref 19.6–45.3)
MCH RBC QN AUTO: 26.8 PG (ref 26.6–33)
MCHC RBC AUTO-ENTMCNC: 32.7 G/DL (ref 31.5–35.7)
MCV RBC AUTO: 81.9 FL (ref 79–97)
MONOCYTES # BLD AUTO: 0.82 10*3/MM3 (ref 0.1–0.9)
MONOCYTES NFR BLD AUTO: 6.9 % (ref 5–12)
NEUTROPHILS NFR BLD AUTO: 58.6 % (ref 42.7–76)
NEUTROPHILS NFR BLD AUTO: 6.95 10*3/MM3 (ref 1.7–7)
PLATELET # BLD AUTO: 191 10*3/MM3 (ref 140–450)
PMV BLD AUTO: 13.7 FL (ref 6–12)
RBC # BLD AUTO: 4.74 10*6/MM3 (ref 3.77–5.28)
THYROPEROXIDASE AB SERPL-ACNC: >600 IU/ML (ref 0–34)
WBC NRBC COR # BLD: 11.85 10*3/MM3 (ref 3.4–10.8)

## 2023-09-28 LAB — TSI SER-ACNC: 110 IU/L (ref 0–0.55)

## 2023-09-29 ENCOUNTER — HOSPITAL ENCOUNTER (EMERGENCY)
Facility: HOSPITAL | Age: 53
Discharge: HOME | End: 2023-09-30
Attending: EMERGENCY MEDICINE
Payer: COMMERCIAL

## 2023-09-29 ENCOUNTER — APPOINTMENT (OUTPATIENT)
Dept: GENERAL RADIOLOGY | Facility: HOSPITAL | Age: 53
End: 2023-09-29
Payer: COMMERCIAL

## 2023-09-29 DIAGNOSIS — K21.9 GASTROESOPHAGEAL REFLUX DISEASE WITHOUT ESOPHAGITIS: Primary | ICD-10-CM

## 2023-09-29 DIAGNOSIS — K59.00 CONSTIPATION, UNSPECIFIED CONSTIPATION TYPE: ICD-10-CM

## 2023-09-29 LAB
ALBUMIN SERPL-MCNC: 4 G/DL (ref 3.5–5.2)
ALBUMIN/GLOB SERPL: 1.1 G/DL
ALP SERPL-CCNC: 99 U/L (ref 39–117)
ALT SERPL W P-5'-P-CCNC: 15 U/L (ref 1–33)
ANION GAP SERPL CALCULATED.3IONS-SCNC: 11.5 MMOL/L (ref 5–15)
ANISOCYTOSIS BLD QL: NORMAL
AST SERPL-CCNC: 19 U/L (ref 1–32)
BASOPHILS # BLD AUTO: 0.1 10*3/MM3 (ref 0–0.2)
BASOPHILS NFR BLD AUTO: 0.9 % (ref 0–1.5)
BILIRUB SERPL-MCNC: 0.2 MG/DL (ref 0–1.2)
BILIRUB UR QL STRIP: NEGATIVE
BUN SERPL-MCNC: 9 MG/DL (ref 6–20)
BUN/CREAT SERPL: 11.1 (ref 7–25)
BURR CELLS BLD QL SMEAR: NORMAL
CALCIUM SPEC-SCNC: 9.3 MG/DL (ref 8.6–10.5)
CHLORIDE SERPL-SCNC: 104 MMOL/L (ref 98–107)
CLARITY UR: CLEAR
CO2 SERPL-SCNC: 24.5 MMOL/L (ref 22–29)
COLOR UR: YELLOW
CREAT SERPL-MCNC: 0.81 MG/DL (ref 0.57–1)
DEPRECATED RDW RBC AUTO: 46.3 FL (ref 37–54)
EGFRCR SERPLBLD CKD-EPI 2021: 85.9 ML/MIN/1.73
EOSINOPHIL # BLD AUTO: 0.42 10*3/MM3 (ref 0–0.4)
EOSINOPHIL NFR BLD AUTO: 3.7 % (ref 0.3–6.2)
ERYTHROCYTE [DISTWIDTH] IN BLOOD BY AUTOMATED COUNT: 15.2 % (ref 12.3–15.4)
GLOBULIN UR ELPH-MCNC: 3.5 GM/DL
GLUCOSE SERPL-MCNC: 123 MG/DL (ref 65–99)
GLUCOSE UR STRIP-MCNC: NEGATIVE MG/DL
HCT VFR BLD AUTO: 37.7 % (ref 34–46.6)
HGB BLD-MCNC: 12 G/DL (ref 12–15.9)
HGB UR QL STRIP.AUTO: NEGATIVE
HOLD SPECIMEN: NORMAL
HOLD SPECIMEN: NORMAL
HYPOCHROMIA BLD QL: NORMAL
IMM GRANULOCYTES # BLD AUTO: 0.03 10*3/MM3 (ref 0–0.05)
IMM GRANULOCYTES NFR BLD AUTO: 0.3 % (ref 0–0.5)
KETONES UR QL STRIP: NEGATIVE
LEUKOCYTE ESTERASE UR QL STRIP.AUTO: NEGATIVE
LIPASE SERPL-CCNC: 50 U/L (ref 13–60)
LYMPHOCYTES # BLD AUTO: 4.03 10*3/MM3 (ref 0.7–3.1)
LYMPHOCYTES NFR BLD AUTO: 35.3 % (ref 19.6–45.3)
MCH RBC QN AUTO: 26.5 PG (ref 26.6–33)
MCHC RBC AUTO-ENTMCNC: 31.8 G/DL (ref 31.5–35.7)
MCV RBC AUTO: 83.2 FL (ref 79–97)
MONOCYTES # BLD AUTO: 0.84 10*3/MM3 (ref 0.1–0.9)
MONOCYTES NFR BLD AUTO: 7.4 % (ref 5–12)
NEUTROPHILS NFR BLD AUTO: 52.4 % (ref 42.7–76)
NEUTROPHILS NFR BLD AUTO: 6 10*3/MM3 (ref 1.7–7)
NITRITE UR QL STRIP: NEGATIVE
NRBC BLD AUTO-RTO: 0 /100 WBC (ref 0–0.2)
PH UR STRIP.AUTO: 7.5 [PH] (ref 5–8)
PLAT MORPH BLD: NORMAL
PLATELET # BLD AUTO: 180 10*3/MM3 (ref 140–450)
PMV BLD AUTO: 12.4 FL (ref 6–12)
POIKILOCYTOSIS BLD QL SMEAR: NORMAL
POTASSIUM SERPL-SCNC: 3.6 MMOL/L (ref 3.5–5.2)
PROT SERPL-MCNC: 7.5 G/DL (ref 6–8.5)
PROT UR QL STRIP: NEGATIVE
RBC # BLD AUTO: 4.53 10*6/MM3 (ref 3.77–5.28)
SODIUM SERPL-SCNC: 140 MMOL/L (ref 136–145)
SP GR UR STRIP: 1.01 (ref 1–1.03)
TROPONIN T SERPL HS-MCNC: <6 NG/L
UROBILINOGEN UR QL STRIP: NORMAL
WBC MORPH BLD: NORMAL
WBC NRBC COR # BLD: 11.42 10*3/MM3 (ref 3.4–10.8)
WHOLE BLOOD HOLD COAG: NORMAL
WHOLE BLOOD HOLD SPECIMEN: NORMAL

## 2023-09-29 PROCEDURE — 83690 ASSAY OF LIPASE: CPT

## 2023-09-29 PROCEDURE — 84484 ASSAY OF TROPONIN QUANT: CPT

## 2023-09-29 PROCEDURE — 74018 RADEX ABDOMEN 1 VIEW: CPT

## 2023-09-29 PROCEDURE — 93005 ELECTROCARDIOGRAM TRACING: CPT

## 2023-09-29 PROCEDURE — 85025 COMPLETE CBC W/AUTO DIFF WBC: CPT

## 2023-09-29 PROCEDURE — 80053 COMPREHEN METABOLIC PANEL: CPT

## 2023-09-29 PROCEDURE — 36415 COLL VENOUS BLD VENIPUNCTURE: CPT

## 2023-09-29 PROCEDURE — 99284 EMERGENCY DEPT VISIT MOD MDM: CPT

## 2023-09-29 PROCEDURE — 81003 URINALYSIS AUTO W/O SCOPE: CPT

## 2023-09-29 PROCEDURE — 85007 BL SMEAR W/DIFF WBC COUNT: CPT

## 2023-09-29 PROCEDURE — 93005 ELECTROCARDIOGRAM TRACING: CPT | Performed by: EMERGENCY MEDICINE

## 2023-09-29 RX ORDER — SODIUM CHLORIDE 0.9 % (FLUSH) 0.9 %
10 SYRINGE (ML) INJECTION AS NEEDED
Status: DISCONTINUED | OUTPATIENT
Start: 2023-09-29 | End: 2023-09-30 | Stop reason: HOSPADM

## 2023-09-30 VITALS
BODY MASS INDEX: 32.59 KG/M2 | RESPIRATION RATE: 19 BRPM | DIASTOLIC BLOOD PRESSURE: 87 MMHG | HEART RATE: 85 BPM | TEMPERATURE: 98.2 F | OXYGEN SATURATION: 98 % | HEIGHT: 60 IN | WEIGHT: 166.01 LBS | SYSTOLIC BLOOD PRESSURE: 156 MMHG

## 2023-09-30 LAB
QT INTERVAL: 380 MS
QTC INTERVAL: 447 MS

## 2023-09-30 PROCEDURE — 36415 COLL VENOUS BLD VENIPUNCTURE: CPT

## 2023-09-30 RX ORDER — ONDANSETRON 4 MG/1
4 TABLET, ORALLY DISINTEGRATING ORAL EVERY 6 HOURS PRN
Qty: 15 TABLET | Refills: 0 | Status: SHIPPED | OUTPATIENT
Start: 2023-09-30

## 2023-09-30 RX ORDER — PROMETHAZINE HYDROCHLORIDE 25 MG/1
25 SUPPOSITORY RECTAL EVERY 6 HOURS PRN
Qty: 6 SUPPOSITORY | Refills: 0 | Status: SHIPPED | OUTPATIENT
Start: 2023-09-30

## 2023-09-30 RX ORDER — SUCRALFATE 1 G/1
1 TABLET ORAL
Qty: 28 TABLET | Refills: 0 | Status: SHIPPED | OUTPATIENT
Start: 2023-09-30

## 2023-10-11 ENCOUNTER — TRANSCRIBE ORDERS (OUTPATIENT)
Dept: ADMINISTRATIVE | Facility: HOSPITAL | Age: 53
End: 2023-10-11
Payer: COMMERCIAL

## 2023-10-11 ENCOUNTER — LAB (OUTPATIENT)
Dept: LAB | Facility: HOSPITAL | Age: 53
End: 2023-10-11
Payer: COMMERCIAL

## 2023-10-11 DIAGNOSIS — E06.3 THYROIDITIS, AUTOIMMUNE: ICD-10-CM

## 2023-10-11 DIAGNOSIS — E06.0 ACUTE THYROIDITIS: Primary | ICD-10-CM

## 2023-10-11 DIAGNOSIS — D72.829 LEUKOCYTOSIS, UNSPECIFIED TYPE: ICD-10-CM

## 2023-10-11 DIAGNOSIS — E06.0 ACUTE THYROIDITIS: ICD-10-CM

## 2023-10-11 LAB
CRP SERPL-MCNC: 0.7 MG/DL (ref 0–0.5)
ERYTHROCYTE [SEDIMENTATION RATE] IN BLOOD: 24 MM/HR (ref 0–30)

## 2023-10-11 PROCEDURE — 86140 C-REACTIVE PROTEIN: CPT

## 2023-10-11 PROCEDURE — 36415 COLL VENOUS BLD VENIPUNCTURE: CPT

## 2023-10-11 PROCEDURE — 85652 RBC SED RATE AUTOMATED: CPT

## 2023-10-16 ENCOUNTER — OFFICE VISIT (OUTPATIENT)
Dept: PSYCHIATRY | Facility: CLINIC | Age: 53
End: 2023-10-16
Payer: COMMERCIAL

## 2023-10-16 DIAGNOSIS — F32.A ANXIETY AND DEPRESSION: Primary | ICD-10-CM

## 2023-10-16 DIAGNOSIS — F41.9 ANXIETY AND DEPRESSION: Primary | ICD-10-CM

## 2023-10-16 PROCEDURE — 1159F MED LIST DOCD IN RCRD: CPT | Performed by: SOCIAL WORKER

## 2023-10-16 PROCEDURE — 1160F RVW MEDS BY RX/DR IN RCRD: CPT | Performed by: SOCIAL WORKER

## 2023-10-16 PROCEDURE — 90837 PSYTX W PT 60 MINUTES: CPT | Performed by: SOCIAL WORKER

## 2023-10-16 NOTE — PSYCHOTHERAPY NOTE
Health issue Thyroid meds  Pain management, not going well,   epidural did not help   Back still hurts     Working,  started getting sick from day care     She has some weight on her,    Doing a good job with her  Thyroid - may help     Self care important     White blood cells     Sleep on and off,  pain interferes    Pay attention to self  talk   Prioritize about cleaning etc.

## 2023-10-16 NOTE — PROGRESS NOTES
Progress Note    Date: 10/16/2023  Time In: 09:00am   Time Out: 09:53am     Patient Legal Name: Zoya Crook  Patient Age: 53 y.o.    CHIEF COMPLAINT: Mood     Subjective   History of Present Illness     Zoya returns today for ongoing psychotherapy.  Our most recent session was on 09/05/23. At that time goals were Psychotherapy for symptom management  Continue effort at smoking cessation.     Assessment    Mental Status Exam     Appearance: good hygiene and dressed appropriately for the weather  Behavior: calm  Cooperation:  engaged, cooperative, attentive, and friendly  Eye Contact:  good  Affect:  congruent  Mood: expressive and cautious  Speech: responsive  Thought Process:  goal-oriented  Thought Content: appropriate  Suicidal: denies  Homicidal:  denies  Hallucinations:  denies  Memory:  intact  Orientation:  person, place, time, and situation  Reliability:  reliable  Insight:  good  Judgment:  good     Clinical Intervention       ICD-10-CM ICD-9-CM   1. Anxiety and depression  F41.9 300.00    F32.A 311      Zoya is a 53 y.o. female who presents today as a follow-up for continued psychotherapy. Individual psychotherapy was provided utilizing solution focused  techniques to provide symptom relief, manage stress, identify triggers, identify strengths, build confidence, challenge negative thinking patterns, and provide support. Zoya reports current slight improvement in depressive symptoms.  She struggles with back pain and other health related concerns.  Discussed importance of pursing care and treatment. We also discussed the importance of prioritizing tasks and balancing self care with responsibilities.  She is practicing recognizing how self talk   impacts anxiety.     Plan   Plan & Goals     Continue to challenge negative self talk  Prioritize self care by continuing to pursue treatment   Psychotherapy for symptom relief.     Patient acknowledged and verbally consented to continue working toward resolving  current treatment plan goals and was educated on the importance of participation in the therapeutic process.  Patient will remain compliant with medication regimen as prescribed. Discuss any medication side effects, questions or concerns with prescribing provider.  Call 911 or present to the nearest emergency room in an emergency situation.  National Suicide Prevention Lifeline: Call 988. The Lifeline provides 24/7, free and confidential support for people in distress, prevention and crisis resources.  Crisis Text Line  Text HOME To 079944    Return in about 3 weeks (around 11/6/2023).    ____________________  This document has been electronically signed by Trudi Almeida LCSW  October 16, 2023 10:03 EDT    Part of this note may be an electronic transcription/translation of spoken language to printed text using the Dragon Dictation System.

## 2023-10-19 ENCOUNTER — TELEMEDICINE (OUTPATIENT)
Dept: PSYCHIATRY | Facility: CLINIC | Age: 53
End: 2023-10-19
Payer: COMMERCIAL

## 2023-10-19 DIAGNOSIS — F51.05 INSOMNIA DUE TO MENTAL DISORDER: ICD-10-CM

## 2023-10-19 DIAGNOSIS — F41.1 GENERALIZED ANXIETY DISORDER: ICD-10-CM

## 2023-10-19 DIAGNOSIS — F33.1 MAJOR DEPRESSIVE DISORDER, RECURRENT EPISODE, MODERATE: Primary | ICD-10-CM

## 2023-10-19 RX ORDER — DULOXETIN HYDROCHLORIDE 30 MG/1
30 CAPSULE, DELAYED RELEASE ORAL DAILY
Qty: 60 CAPSULE | Refills: 2 | Status: SHIPPED | OUTPATIENT
Start: 2023-10-19

## 2023-10-19 RX ORDER — MIRTAZAPINE 7.5 MG/1
7.5 TABLET, FILM COATED ORAL NIGHTLY
Qty: 30 TABLET | Refills: 2 | Status: SHIPPED | OUTPATIENT
Start: 2023-10-19

## 2023-10-19 RX ORDER — HYDROXYZINE PAMOATE 25 MG/1
25 CAPSULE ORAL DAILY PRN
Qty: 30 CAPSULE | Refills: 2 | Status: SHIPPED | OUTPATIENT
Start: 2023-10-19

## 2023-10-19 NOTE — PROGRESS NOTES
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 11/26/2022 9.7  5.0 - 15.0 mmol/L Final    eGFR 11/26/2022 106.4  >60.0 mL/min/1.73 Final    National Kidney Foundation and American Society of Nephrology (ASN) Task Force recommended calculation based on the Chronic Kidney Disease Epidemiology Collaboration (CKD-EPI) equation refit without adjustment for race.    Protime 11/26/2022 13.2  11.8 - 14.9 Seconds Final    INR 11/26/2022 0.99  0.86 - 1.15 Final    PTT 11/26/2022 27.0  24.2 - 34.2 seconds Final    Troponin T 11/26/2022 <0.010  0.000 - 0.030 ng/mL Final    Color, UA 11/26/2022 Yellow  Yellow, Straw Final    Appearance, UA 11/26/2022 Clear  Clear Final    pH, UA 11/26/2022 7.0  5.0 - 8.0 Final    Specific Gravity, UA 11/26/2022 >1.030 (H)  1.005 - 1.030 Final    Glucose, UA 11/26/2022 Negative  Negative Final    Ketones, UA 11/26/2022 Negative  Negative Final    Bilirubin, UA 11/26/2022 Negative  Negative Final    Blood, UA 11/26/2022 Negative  Negative Final    Protein, UA 11/26/2022 Negative  Negative Final    Leuk Esterase, UA 11/26/2022 Trace (A)  Negative Final    Nitrite, UA 11/26/2022 Negative  Negative Final    Urobilinogen, UA 11/26/2022 0.2 E.U./dL  0.2 - 1.0 E.U./dL Final    Extra Tube 11/26/2022 Hold for add-ons.   Final    Auto resulted.    Extra Tube 11/26/2022 hold for add-on   Final    Auto  resulted    Extra Tube 11/26/2022 Hold for add-ons.   Final    Auto resulted.    Extra Tube 11/26/2022 Hold for add-ons.   Final    Auto resulted    WBC 11/26/2022 9.50  3.40 - 10.80 10*3/mm3 Final    RBC 11/26/2022 5.20  3.77 - 5.28 10*6/mm3 Final    Hemoglobin 11/26/2022 14.8  12.0 - 15.9 g/dL Final    Hematocrit 11/26/2022 45.2  34.0 - 46.6 % Final    MCV 11/26/2022 86.9  79.0 - 97.0 fL Final    MCH 11/26/2022 28.5  26.6 - 33.0 pg Final    MCHC 11/26/2022 32.7  31.5 - 35.7 g/dL Final    RDW 11/26/2022 14.3  12.3 - 15.4 % Final    RDW-SD 11/26/2022 45.9  37.0 - 54.0 fl Final    MPV 11/26/2022 12.3 (H)  6.0 - 12.0 fL Final    Platelets 11/26/2022 167  140 - 450 10*3/mm3 Final    Neutrophil % 11/26/2022 61.3  42.7 - 76.0 % Final    Lymphocyte % 11/26/2022 27.1  19.6 - 45.3 % Final    Monocyte % 11/26/2022 8.0  5.0 - 12.0 % Final    Eosinophil % 11/26/2022 2.8  0.3 - 6.2 % Final    Basophil % 11/26/2022 0.5  0.0 - 1.5 % Final    Immature Grans % 11/26/2022 0.3  0.0 - 0.5 % Final    Neutrophils, Absolute 11/26/2022 5.82  1.70 - 7.00 10*3/mm3 Final    Lymphocytes, Absolute 11/26/2022 2.57  0.70 - 3.10 10*3/mm3 Final    Monocytes, Absolute 11/26/2022 0.76  0.10 - 0.90 10*3/mm3 Final    Eosinophils, Absolute 11/26/2022 0.27  0.00 - 0.40 10*3/mm3 Final    Basophils, Absolute 11/26/2022 0.05  0.00 - 0.20 10*3/mm3 Final    Immature Grans, Absolute 11/26/2022 0.03  0.00 - 0.05 10*3/mm3 Final    nRBC 11/26/2022 0.0  0.0 - 0.2 /100 WBC Final    Glucose 11/26/2022 93  70 - 99 mg/dL Final    Serial Number: [id number]:  [id number]    Creatinine 11/26/2022 0.60  mg/dL Final    Serial Number: [id number]:  [id number]    eGFR 11/26/2022 106.8  >60.0 mL/min/1.73 Final    RBC, UA 11/26/2022 0-2 (A)  None Seen /HPF Final    WBC, UA 11/26/2022 3-5 (A)  None Seen /HPF Final    Bacteria, UA 11/26/2022 1+ (A)  None Seen /HPF Final    Squamous Epithelial Cells, UA 11/26/2022 7-12 (A)  None Seen, 0-2 /HPF Final    Hyaline Casts, UA  11/26/2022 None Seen  None Seen /LPF Final    Methodology 11/26/2022 Automated Microscopy   Final    WBC 11/26/2022 11.04 (H)  3.40 - 10.80 10*3/mm3 Final    RBC 11/26/2022 5.35 (H)  3.77 - 5.28 10*6/mm3 Final    Hemoglobin 11/26/2022 15.2  12.0 - 15.9 g/dL Final    Hematocrit 11/26/2022 46.1  34.0 - 46.6 % Final    MCV 11/26/2022 86.2  79.0 - 97.0 fL Final    MCH 11/26/2022 28.4  26.6 - 33.0 pg Final    MCHC 11/26/2022 33.0  31.5 - 35.7 g/dL Final    RDW 11/26/2022 14.6  12.3 - 15.4 % Final    RDW-SD 11/26/2022 45.6  37.0 - 54.0 fl Final    MPV 11/26/2022 12.3 (H)  6.0 - 12.0 fL Final    Platelets 11/26/2022 187  140 - 450 10*3/mm3 Final    Glucose 11/26/2022 109 (H)  65 - 99 mg/dL Final    BUN 11/26/2022 8  6 - 20 mg/dL Final    Creatinine 11/26/2022 0.59  0.57 - 1.00 mg/dL Final    Sodium 11/26/2022 141  136 - 145 mmol/L Final    Potassium 11/26/2022 4.0  3.5 - 5.2 mmol/L Final    Chloride 11/26/2022 102  98 - 107 mmol/L Final    CO2 11/26/2022 29.5 (H)  22.0 - 29.0 mmol/L Final    Calcium 11/26/2022 9.8  8.6 - 10.5 mg/dL Final    Total Protein 11/26/2022 8.2  6.0 - 8.5 g/dL Final    Albumin 11/26/2022 4.70  3.50 - 5.20 g/dL Final    ALT (SGPT) 11/26/2022 29  1 - 33 U/L Final    AST (SGOT) 11/26/2022 24  1 - 32 U/L Final    Alkaline Phosphatase 11/26/2022 127 (H)  39 - 117 U/L Final    Total Bilirubin 11/26/2022 0.5  0.0 - 1.2 mg/dL Final    Globulin 11/26/2022 3.5  gm/dL Final    A/G Ratio 11/26/2022 1.3  g/dL Final    BUN/Creatinine Ratio 11/26/2022 13.6  7.0 - 25.0 Final    Anion Gap 11/26/2022 9.5  5.0 - 15.0 mmol/L Final    eGFR 11/26/2022 107.3  >60.0 mL/min/1.73 Final    National Kidney Foundation and American Society of Nephrology (ASN) Task Force recommended calculation based on the Chronic Kidney Disease Epidemiology Collaboration (CKD-EPI) equation refit without adjustment for race.    Target HR (85%) 11/26/2022 141  bpm Final    Max. Pred. HR (100%) 11/26/2022 166  bpm Final    EF(MOD-bp) 11/26/2022  58.2  % Final    LVIDd 11/26/2022 4.3  cm Final    LVIDs 11/26/2022 2.5  cm Final    IVSd 11/26/2022 1.1  cm Final    LVPWd 11/26/2022 1.0  cm Final    LVOT diam 11/26/2022 2.0  cm Final    EDV(MOD-sp2) 11/26/2022 50.0  ml Final    EDV(MOD-sp4) 11/26/2022 50.0  ml Final    ESV(MOD-sp2) 11/26/2022 21.0  ml Final    ESV(MOD-sp4) 11/26/2022 21.0  ml Final    MV E max luke 11/26/2022 78.0  cm/sec Final    MV A max luke 11/26/2022 80.0  cm/sec Final    MV dec time 11/26/2022 174  msec Final    MV E/A 11/26/2022 1.0   Final    IVRT 11/26/2022 74.0  msec Final    LA ESV Index (BP) 11/26/2022 15.6  ml/m2 Final    Med Peak E' Luke 11/26/2022 6.64  cm/sec Final    Lat Peak E' Luke 11/26/2022 9.9  cm/sec Final    Avg E/e' ratio 11/26/2022 9.43   Final    RVIDd 11/26/2022 2.80  cm Final    TAPSE (>1.6) 11/26/2022 1.91  cm Final    LA dimension (2D)  11/26/2022 3.0  cm Final    Ao root diam 11/26/2022 3.2  cm Final    Hemoglobin A1C 11/27/2022 5.60  4.80 - 5.60 % Final    Total Cholesterol 11/27/2022 186  0 - 200 mg/dL Final    Triglycerides 11/27/2022 91  0 - 150 mg/dL Final    HDL Cholesterol 11/27/2022 50  40 - 60 mg/dL Final    LDL Cholesterol  11/27/2022 119 (H)  0 - 100 mg/dL Final    VLDL Cholesterol 11/27/2022 17  5 - 40 mg/dL Final    LDL/HDL Ratio 11/27/2022 2.36   Final    WBC 11/27/2022 7.61  3.40 - 10.80 10*3/mm3 Final    RBC 11/27/2022 5.08  3.77 - 5.28 10*6/mm3 Final    Hemoglobin 11/27/2022 14.5  12.0 - 15.9 g/dL Final    Hematocrit 11/27/2022 43.6  34.0 - 46.6 % Final    MCV 11/27/2022 85.8  79.0 - 97.0 fL Final    MCH 11/27/2022 28.5  26.6 - 33.0 pg Final    MCHC 11/27/2022 33.3  31.5 - 35.7 g/dL Final    RDW 11/27/2022 14.1  12.3 - 15.4 % Final    RDW-SD 11/27/2022 45.0  37.0 - 54.0 fl Final    MPV 11/27/2022 12.3 (H)  6.0 - 12.0 fL Final    Platelets 11/27/2022 152  140 - 450 10*3/mm3 Final    Glucose 11/27/2022 128 (H)  65 - 99 mg/dL Final    BUN 11/27/2022 11  6 - 20 mg/dL Final    Creatinine 11/27/2022 0.68   0.57 - 1.00 mg/dL Final    Sodium 11/27/2022 139  136 - 145 mmol/L Final    Potassium 11/27/2022 4.5  3.5 - 5.2 mmol/L Final    Chloride 11/27/2022 102  98 - 107 mmol/L Final    CO2 11/27/2022 27.2  22.0 - 29.0 mmol/L Final    Calcium 11/27/2022 9.9  8.6 - 10.5 mg/dL Final    Total Protein 11/27/2022 7.5  6.0 - 8.5 g/dL Final    Albumin 11/27/2022 4.20  3.50 - 5.20 g/dL Final    ALT (SGPT) 11/27/2022 24  1 - 33 U/L Final    AST (SGOT) 11/27/2022 20  1 - 32 U/L Final    Alkaline Phosphatase 11/27/2022 109  39 - 117 U/L Final    Total Bilirubin 11/27/2022 0.5  0.0 - 1.2 mg/dL Final    Globulin 11/27/2022 3.3  gm/dL Final    A/G Ratio 11/27/2022 1.3  g/dL Final    BUN/Creatinine Ratio 11/27/2022 16.2  7.0 - 25.0 Final    Anion Gap 11/27/2022 9.8  5.0 - 15.0 mmol/L Final    eGFR 11/27/2022 103.6  >60.0 mL/min/1.73 Final    National Kidney Foundation and American Society of Nephrology (ASN) Task Force recommended calculation based on the Chronic Kidney Disease Epidemiology Collaboration (CKD-EPI) equation refit without adjustment for race.       EKG Results:  No orders to display       Imaging Results:  CT Angiogram Neck    Result Date: 11/26/2022    1. No hemodynamically significant stenosis of the major cervical arteries, as above 2. Heterogeneous appearance of the thyroid may be secondary to a history of thyroiditis and or small nodules. 3. Multiple cervical lymph nodes bilaterally at the upper limit of normal for size.     Manolo Vigil M.D.       Electronically Signed and Approved By: Manolo Vigil M.D. on 11/26/2022 at 8:50             MRI Brain Without Contrast    Result Date: 1/10/2023   Scattered foci of increased T2 and FLAIR signal consistent with chronic microvascular ischemia.  No significant change.  No acute intracranial abnormality.      EMILI WILSON MD       Electronically Signed and Approved By: EMILI WILSON MD on 1/10/2023 at 1:06             MRI Brain Without Contrast    Result Date: 11/26/2022    1.  No acute infarction or intracranial hemorrhage 2. Mild signal abnormality in the cerebral white matter , nonspecific in appearance but most likely representing small vessel ischemic disease in a patient of this age. 3. Prominent mucosal inflammatory changes in the bilateral ethmoid and right maxillary sinuses.      Manolo Vigil M.D.       Electronically Signed and Approved By: Manolo Vigil M.D. on 11/26/2022 at 14:59             XR Chest 1 View    Result Date: 11/26/2022    1. No acute cardiopulmonary disease       Manolo Vigil M.D.       Electronically Signed and Approved By: Manolo Vigil M.D. on 11/26/2022 at 8:32             XR Pelvis 1 or 2 View    Result Date: 11/26/2022    1. No acute finding 2. Mild bilateral hip osteoarthritis      Manolo Vigil M.D.       Electronically Signed and Approved By: Manolo Vigil M.D. on 11/26/2022 at 13:31             MRI Hip Right Arthrogram    Result Date: 1/13/2023    1. Mild bilateral hip osteoarthritis 2. No internal derangement of the right hip joint     Manolo Vigil M.D.       Electronically Signed and Approved By: Manolo Vigil M.D. on 1/13/2023 at 14:21             CT Head Without Contrast Stroke Protocol    Result Date: 11/26/2022    1. Mild small vessel ischemic changes in the white matter     Manolo Vigil M.D.       Electronically Signed and Approved By: Manolo Vigil M.D. on 11/26/2022 at 7:49             CT Angiogram Head w AI Analysis of LVO    Result Date: 11/26/2022    1. Relatively mild atherosclerotic calcification involving the intracranial segments of both internal carotid arteries. 2. No hemodynamically significant stenosis of the intracranial vasculature.     Manolo Vigil M.D.       Electronically Signed and Approved By: Manolo Vigil M.D. on 11/26/2022 at 9:18             CT CEREBRAL PERFUSION WITH & WITHOUT CONTRAST    Result Date: 11/26/2022    1. Study within normal limits.      Manolo Vigil M.D.       Electronically Signed and Approved By:  Manolo Vigil M.D. on 11/26/2022 at 7:57             [place] Contrast Injection CT / MRI    Result Date: 1/13/2023   Right hip Arthrogram was performed without complication, patient tolerated procedure.  The patient was instructed to obtain follow up care from the referring physician.   The above procedure was directly supervised by Dr. Vigil.   LINDSEY HUDSON       Electronically Signed and Approved By: Manolo Vigil M.D. on 1/13/2023 at 12:08               Assessment & Plan   Diagnoses and all orders for this visit:    1. Major depressive disorder, recurrent episode, moderate (Primary)  -     mirtazapine (REMERON) 7.5 MG tablet; Take 1 tablet by mouth Every Night.  Dispense: 30 tablet; Refill: 2  -     DULoxetine (CYMBALTA) 30 MG capsule; Take 1 capsule by mouth Daily.  Dispense: 60 capsule; Refill: 2    2. Generalized anxiety disorder  -     hydrOXYzine pamoate (VISTARIL) 25 MG capsule; Take 1 capsule by mouth Daily As Needed for Anxiety.  Dispense: 30 capsule; Refill: 2    3. Insomnia due to mental disorder      Continue mirtazapine to target depression, anxiety and insomnia. Continue duloxetine to target depression and anxiety.     Visit Diagnoses:    ICD-10-CM ICD-9-CM   1. Major depressive disorder, recurrent episode, moderate  F33.1 296.32   2. Generalized anxiety disorder  F41.1 300.02   3. Insomnia due to mental disorder  F51.05 300.9     327.02       PLAN:  Safety: No acute safety concerns.   Therapy: Trudi Almeida  Risk Assessment: Risk of self-harm acutely is moderate.  Risk factors include anxiety disorder, mood disorder, and recent psychosocial stressors (pandemic). Protective factors include no family history, denies access to guns/weapons, no present SI, no history of suicide attempts or self-harm in the past, minimal AODA, healthcare seeking, future orientation, willingness to engage in care.  Risk of self-harm chronically is also moderate, but could be further elevated in the event of treatment  noncompliance and/or AODA.  Medications: Continue mirtazapine 7.5mg po qhs to target depression and anxiety. Risks, benefits, alternatives discussed with patient including GI upset, sedation, dizziness with falls risk, increased appetite.  After discussion of these risks and benefits, the patient voiced understanding and agreed to proceed.Continue hydroxyzine 25mg po qday prn anxiety. Risks, benefits, alternatives discussed with patient including sedation, dizziness, fall risk, GI upset, and risk of increased CNS depression and elevated heart rate if taken with other antihistamines.  After discussion of these risks and benefits, the patient voiced understanding and agreed to proceed. Continue duloxetine 30mg po qday to target depression and anxiety. Discussed all risks, benefits, alternatives, and side effects of Duloxetine including but not limited to GI upset, sexual dysfunction, bleeding risk, seizure risk, weight loss, insomnia, diaphoresis, drowsiness, headache, dizziness, fatigue, activation of kimberlee or hypomania, increased fragility fracture risk, hyponatremia, increased BP, hepatotoxicity, ocular effects, withdrawal syndrome following abrupt discontinuation, serotonin syndrome, and activation of suicidal ideation and behavior.  Pt educated on the need to practice safe sex while taking this med. Discussed the need for pt to immediately call the office for any new or worsening symptoms, such as worsening depression; feeling nervous or restless; suicidal thoughts or actions; or other changes changes in mood or behavior, and all other concerns. Pt educated on med compliance and the risks of suddenly stopping this medication or missing doses. Pt verbalized understanding and is agreeable to taking Duloxetine. Addressed all questions and concerns.  Labs/studies: No labs/studies ordered at this time  Follow-up: 4 weeks    Patient screened positive for depression based on a PHQ-9 score of  on . Follow-up  recommendations include: Suicide Risk Assessment performed.     TREATMENT PLAN/GOALS: Continue supportive psychotherapy efforts and medications as indicated. Treatment and medication options discussed during today's visit. Patient ackowledged and verbally consented to continue with current treatment plan and was educated on the importance of compliance with treatment and follow-up appointments.    MEDICATION ISSUES:  OSIEL reviewed as expected.  Discussed medication options and treatment plan of prescribed medication as well as the risks, benefits, and side effects including potential falls, possible impaired driving and metabolic adversities among others. Patient is agreeable to call the office with any worsening of symptoms or onset of side effects. Patient is agreeable to call 911 or go to the nearest ER should he/she begin having SI/HI. No medication side effects or related complaints today.     MEDS ORDERED DURING VISIT:  New Medications Ordered This Visit   Medications    mirtazapine (REMERON) 7.5 MG tablet     Sig: Take 1 tablet by mouth Every Night.     Dispense:  30 tablet     Refill:  2    DULoxetine (CYMBALTA) 30 MG capsule     Sig: Take 1 capsule by mouth Daily.     Dispense:  60 capsule     Refill:  2    hydrOXYzine pamoate (VISTARIL) 25 MG capsule     Sig: Take 1 capsule by mouth Daily As Needed for Anxiety.     Dispense:  30 capsule     Refill:  2       Return in about 4 weeks (around 11/16/2023) for Next scheduled follow up.         This document has been electronically signed by ZAYNAB Sinclair  October 19, 2023 08:43 EDT      Part of this note may be an electronic transcription/translation of spoken language to printed text using the Dragon Dictation System.

## 2023-10-19 NOTE — PSYCHOTHERAPY NOTE
Supportive psychotherapy with goal to strengthen defenses, promote problems solving, restore adaptive functioning and provide symptom relief. Stressors: Physical.  Coping skills utilized: Positive Thoughts. Current goal: Practice a self-care activity twice a week. Assisted patient in processing session content; acknowledged and normalized patient's thoughts, feelings, and concerns by utilizing a person-centered approach in efforts to build appropriate rapport and a positive therapeutic relationship with open and honest communication..17 minutes of supportive psychotherapy.     Diagnoses: see next note  Symptoms: see next note  Functional status: good  Mental Status Exam: see next note     Treatment plan: medication management and supportive psychotherapy  Prognosis: good  Progress: Continued Improvement

## 2023-10-30 NOTE — PROGRESS NOTES
Progress Note    Date: 10/31/2023  Time In: 09:45  Time Out: 10:40    Patient Legal Name: Zoya Crook  Patient Age: 53 y.o.    CHIEF COMPLAINT: Depression, anxiety     Subjective   History of Present Illness       Zoya returns today for ongoing psychotherapy.  Our most recent session was on 10/16/23.  At that time goals were to   Continue to challenge negative self talk  Prioritize self care by continuing to pursue treatment   Psychotherapy for symptom relief.     Assessment    Mental Status Exam     Appearance: good hygiene and dressed appropriately for the weather  Behavior: calm  Cooperation:  engaged, cooperative, attentive, and friendly  Eye Contact:  good  Affect:  congruent  Mood: expressive and fearful  Speech: responsive and talkative  Thought Process:  linear  Thought Content: appropriate  Suicidal: denies  Homicidal:  denies  Hallucinations:  denies  Memory:  intact  Orientation:  person, place, time, and situation  Reliability:  reliable  Insight:  good  Judgment:  good     Clinical Intervention       ICD-10-CM ICD-9-CM   1. Anxiety and depression  F41.9 300.00    F32.A 311      Zoya is a 53 y.o. female who presents today as a follow-up for continued psychotherapy. Individual psychotherapy was provided utilizing solution focused techniques to provide symptom relief, encourage expression of thoughts and feelings, promote emotion regulation, encourage change talk, discuss values and strengths, manage stress, identify triggers, recognize patterns of behavior, acknowledge sources of feelings and behaviors, assess symptoms, challenge negative thinking patterns, and provide support. Zoya reports ongoing health concerns.  She recognizes that her physical health impacts her mood and thoughts.  Discussed the importance of health care.  She recognizes that  when she feels symptoms, if she can get to a calm quiet space she has relief.  Discussed negative thinking and how that impacts mood.  She continues to also have  stress over her daughters living situation and worries about her children.  Zoya reports that she takes prn medication for anxiety approximately 3 times per week.  Discussed triggers for this anxiety and behavioral approach to reduce symptoms.    Plan   Plan & Goals     Psychotherapy for symptom management   Recognize negative thinking, challenge it and develop habits for coping     Patient acknowledged and verbally consented to continue working toward resolving current treatment plan goals and was educated on the importance of participation in the therapeutic process.  Patient will remain compliant with medication regimen as prescribed. Discuss any medication side effects, questions or concerns with prescribing provider.  Call 911 or present to the nearest emergency room in an emergency situation.  National Suicide Prevention Lifeline: Call 988. The Lifeline provides 24/7, free and confidential support for people in distress, prevention and crisis resources.  Crisis Text Line  Text HOME To 159509    Return in about 3 weeks (around 11/21/2023).    ____________________  This document has been electronically signed by Trudi Almeida LCSW  October 31, 2023 10:58 EDT    Part of this note may be an electronic transcription/translation of spoken language to printed text using the Dragon Dictation System.

## 2023-10-31 ENCOUNTER — OFFICE VISIT (OUTPATIENT)
Dept: PSYCHIATRY | Facility: CLINIC | Age: 53
End: 2023-10-31
Payer: COMMERCIAL

## 2023-10-31 DIAGNOSIS — F32.A ANXIETY AND DEPRESSION: Primary | ICD-10-CM

## 2023-10-31 DIAGNOSIS — F41.9 ANXIETY AND DEPRESSION: Primary | ICD-10-CM

## 2023-10-31 PROCEDURE — 1159F MED LIST DOCD IN RCRD: CPT | Performed by: SOCIAL WORKER

## 2023-10-31 PROCEDURE — 90837 PSYTX W PT 60 MINUTES: CPT | Performed by: SOCIAL WORKER

## 2023-10-31 PROCEDURE — 1160F RVW MEDS BY RX/DR IN RCRD: CPT | Performed by: SOCIAL WORKER

## 2023-10-31 NOTE — PSYCHOTHERAPY NOTE
Stomach and thyroid  Appt with Fay 11/30     Thyroid med,  couple weeks   Never had colonoscopy - call PCP and ask for order   Before stroke I did not have all these problems     Can feel when blood pressure  up,  stay [place],m     Baby helps, birthday in February    [place]     3 yo     Saw Chris on 19th   Take hydroxyzine   Overthinking about kids,living situation, tough when I see it in front of me every day.

## 2023-11-15 ENCOUNTER — TELEMEDICINE (OUTPATIENT)
Dept: PSYCHIATRY | Facility: CLINIC | Age: 53
End: 2023-11-15
Payer: COMMERCIAL

## 2023-11-15 DIAGNOSIS — F33.1 MAJOR DEPRESSIVE DISORDER, RECURRENT EPISODE, MODERATE: Primary | ICD-10-CM

## 2023-11-15 DIAGNOSIS — F41.1 GENERALIZED ANXIETY DISORDER: ICD-10-CM

## 2023-11-15 NOTE — PROGRESS NOTES
This provider is located at the [facility] (through [facility]), [address], [place] [facility], [address] using a secure MyChart Video Visit through [employer]. Patient is being seen remotely via telehealth at their home address in [place], and stated they are in a secure environment for this session. The patient's condition being diagnosed/treated is appropriate for telemedicine. The provider identified himself as well as his credentials.   The patient consent to be seen remotely, and when consent is given they understand that the consent allows for patient identifiable information to be sent to a third party as needed.   They may refuse to be seen remotely at any time. The electronic data is encrypted and password protected, and the patient  has been advised of the potential risks to privacy not withstanding such measures.    You have chosen to receive care through a telehealth visit.  Do you consent to use a video/audio connection for your medical care today? Yes    Patient identifiers utilized: Name and date of birth.    Patient verbally confirmed consent for today's encounter- yes    Subjective   Zoya Crook is a 53 y.o. female who presents today for follow-up appointment.     Chief Complaint:  Depression, anxiety    History of Present Illness:     Depression/mood  Doing well  Pain is stressor- working with pain management   Going over to Sister's house for Thanksgiving   Anxiety  Has been high at times  News is trigger  Excessive worries   Working on positive coping skills  Sleep disturbance: n  Low energy: n  Substance use: n  Medication compliant  Side effects: denies  Refills needed    Prior Psychiatric Medications:  Sertraline, prozac, paxil     The following portions of the patient's history were reviewed and updated as appropriate: allergies, current medications, past family history, past medical history, past social history, past surgical history and problem  list.    Allergy:   Allergies   Allergen Reactions    Acetaminophen Unknown - High Severity    Naproxen Other (See Comments)     GI UPSET/ HX ULCER    Sulfamethoxazole Other (See Comments)    Ciprofloxacin Rash        Current Medications:   Current Outpatient Medications   Medication Sig Dispense Refill    amLODIPine (NORVASC) 10 MG tablet Take 1 tablet every day by oral route for 90 days.      aspirin 81 MG chewable tablet aspirin 81 mg chewable tablet      atorvastatin (LIPITOR) 80 MG tablet Take 1 tablet by mouth Every Night. 30 tablet 0    brompheniramine-pseudoephedrine-DM 30-2-10 MG/5ML syrup Take 5 mL by mouth 4 (Four) Times a Day As Needed for Allergies. 118 mL 0    clopidogrel (PLAVIX) 75 MG tablet Take 1 tablet by mouth Daily.      cyclobenzaprine (FLEXERIL) 10 MG tablet       DULoxetine (CYMBALTA) 30 MG capsule Take 1 capsule by mouth Daily. 60 capsule 2    fluticasone (FLONASE) 50 MCG/ACT nasal spray fluticasone propionate 50 mcg/actuation nasal spray,suspension   USE 1 SPRAY IN EACH NOSTRIL ONCE DAILY      hydrOXYzine pamoate (VISTARIL) 25 MG capsule Take 1 capsule by mouth Daily As Needed for Anxiety. 30 capsule 2    lidocaine (LIDODERM) 5 % APPLY 1 PATCH BY TOPICAL ROUTE ONCE DAILY (MAY WEAR UP TO 12HOURS.)on 12hrs off affected area      mirtazapine (REMERON) 7.5 MG tablet Take 1 tablet by mouth Every Night. 30 tablet 2    omeprazole (priLOSEC) 40 MG capsule Take 1 capsule by mouth Daily.      ondansetron ODT (ZOFRAN-ODT) 4 MG disintegrating tablet Place 1 tablet on the tongue Every 6 (Six) Hours As Needed for Vomiting. 15 tablet 0    pantoprazole (PROTONIX) 40 MG EC tablet Every 12 (Twelve) Hours.      promethazine (PHENERGAN) 25 MG suppository Insert 1 suppository into the rectum Every 6 (Six) Hours As Needed for Nausea or Vomiting. 6 suppository 0    propranolol LA (INDERAL LA) 80 MG 24 hr capsule       sucralfate (CARAFATE) 1 g tablet Take 1 tablet by mouth 3 (Three) Times a Day With Meals. May  dissolve tablets in a small amount of water to help with swallowing 28 tablet 0    traMADol (ULTRAM) 50 MG tablet Take 1 tablet by mouth Every 8 (Eight) Hours As Needed for Severe Pain. 21 tablet 0     No current facility-administered medications for this visit.       Mental Status Exam:   Hygiene:   good  Cooperation:  Cooperative  Eye Contact:  Good  Psychomotor Behavior:  Appropriate  Affect:  Full range  Mood: normal  Hopelessness: Denies  Speech:  Normal  Thought Process:  Goal directed  Thought Content:  Normal  Suicidal:  None  Homicidal:  None  Hallucinations:  None  Delusion:  None  Memory:  Intact  Orientation:  Person, Place, Time, and Situation  Reliability:  good  Insight:  Good  Judgement:  Good  Impulse Control:  Good  Physical/Medical Issues:  No      Physical Exam:   There were no vitals taken for this visit. There is no height or weight on file to calculate BMI.   Due to the remote nature of this encounter (virtual encounter), vitals were unable to be obtained.  Weight change: n    PHQ-9 Depression Screening  Little interest or pleasure in doing things?     Feeling down, depressed, or hopeless?     Trouble falling or staying asleep, or sleeping too much?     Feeling tired or having little energy?     Poor appetite or overeating?     Feeling bad about yourself - or that you are a failure or have let yourself or your family down?     Trouble concentrating on things, such as reading the newspaper or watching television?     Moving or speaking so slowly that other people could have noticed? Or the opposite - being so fidgety or restless that you have been moving around a lot more than usual?     Thoughts that you would be better off dead, or of hurting yourself in some way?     PHQ-9 Total Score     If you checked off any problems, how difficult have these problems made it for you to do your work, take care of things at home, or get along with other people?       PHQ-9 Total Score:           Previous  available Provider notes and records reviewed by this APRN at today's encounter.     Visit Diagnoses:    ICD-10-CM ICD-9-CM   1. Major depressive disorder, recurrent episode, moderate  F33.1 296.32   2. Generalized anxiety disorder  F41.1 300.02       TREATMENT PLAN: Continue supportive psychotherapy efforts and medications.  Medication and treatment options, both pharmacological and non-pharmacological treatment options, discussed during today's visit, including any off label use of medication. Patient acknowledged and verbally consented with current treatment plan and was educated on the importance of compliance with treatment and follow-up appointments.      - Continue mirtazapine 7.5mg po qhs to target depression and anxiety  - Continue duloxetine 30mg po qday to target depression and anxiety    Labs: None ordered at this time  Therapy: Trudi Almeida    MEDICATION ISSUES:  Discussed treatment plan and medication options of prescribed medication as well as the risks, benefits, any black box warnings, and side effects including potential falls, possible impaired driving, and metabolic adversities among others, including any off label use of medication. Patient is agreeable to call the office with any worsening of symptoms or onset of side effects, or if any concerns or questions arise.  The contact information for the office is made available to the patient. Patient is agreeable to call 911 or go to the nearest ER should they begin having any SI/HI, or if any urgent concerns arise. No medication side effects or related complaints today. OSIEL reviewed as expected.    RISK ASSESSMENT:  Risk of self-harm acutely is moderate.  Risk factors include anxiety disorder, mood disorder, and recent psychosocial stressors (pandemic). Protective factors include no family history, denies access to guns/weapons, no present SI, no history of suicide attempts or self-harm in the past, minimal AODA, healthcare seeking, future  orientation, willingness to engage in care.  Risk of self-harm chronically is also moderate, but could be further elevated in the event of treatment noncompliance and/or AODA.    VERBAL INFORMED CONSENT FOR MEDICATION:  The patient was educated that their proposed/prescribed psychotropic medication(s) has potential risks, side effects, adverse effects, and black box warnings; and these have been discussed with the patient.  The patient has been informed that their treatment and medication dosage is to be individualized, and may even be above or below the recommended range/dosage due to patient individualization and response, but medication is prescribed using a shared decision making approach, and no medication or dosage will be prescribed without the patient's verbal consent.  The reason for the use of the medication including any off label use and alternative modes of treatment other than or in addition to medication has been considered and discussed, the probable consequences of not receiving the proposed treatment have been discussed, and any treatment side effects, black box warnings, and cautions associated with treatment have been discussed with the patient.  The patient is allowed ample time to openly discuss and ask questions regarding the proposed medication(s) and treatment plan and the patient verbalizes understanding the reasons for the use of the medication, its potential risks and benefits, other alternative treatment(s), and the probable consequences that may occur if the proposed medication is not given.  The patient has been given ample time to ask questions and study the information and find the information to be specific, accurate, and complete.  The patient gives verbal consent for the medication(s) proposed/prescribed, they verbalized understanding that they can refuse and withdraw consent at any time with the assistance of this APRN, and the patient has verbally confirmed that they are aware,  and are willing, to take the prescribed medication and follow the treatment plan with the known possible risks, side effect, black box warnings, and any potential medication interactions, and the patient reports they will be worse off without this medication and treatment plan.  The patient is advised to contact this APRN/this office if any questions or concerns arise at any time (at [phone]), or call 911/go to the closest emergency department if needed or outside of office hours.      [facility] No Show Policy:  We understand unexpected circumstances arise; however, anytime you miss your appointment we are unable to provide you appropriate care.  In addition, each appointment missed could have been used to provide care for others.  We ask that you call at least 24 hours in advance to cancel or reschedule an appointment.  We would like to take this opportunity to remind you of our policy stating patients who miss THREE or more appointments without cancelling or rescheduling 24 hours in advance of the appointment may be subject to cancellation of any further visits with our clinic and recommendation to seek in-person services/visits.    Please call [phone] to reschedule your appointment. If there are reasons that make it difficult for you to keep the appointments, please call and let us know how we can help.  Please understand that medication prescribing will not continue without seeing your provider.      [facility]'s No Show Policy reviewed with patient at today's visit. Patient verbalized understanding of this policy. Discussed with patient that in the event that there are three or more no show visits, it will be recommended that they pursue in-person services/visits as noncompliance with telehealth visits indicates that patient is not an appropriate candidate for telemedicine and would likely be more appropriate for in-person services/visits. Patient  verbalizes understanding and is agreeable to this.    MEDS ORDERED DURING VISIT:  No orders of the defined types were placed in this encounter.      Return in about 4 weeks (around 12/13/2023) for Next scheduled follow up.         Progress toward goal: Not at goal    Functional Status: No impairment    Prognosis: Good with Ongoing Treatment     This document has been electronically signed by ZAYNAB Sinclair  November 15, 2023 09:13 EST      Please note that portions of this note were completed with a voice recognition program.

## 2023-11-17 ENCOUNTER — TRANSCRIBE ORDERS (OUTPATIENT)
Dept: ADMINISTRATIVE | Facility: HOSPITAL | Age: 53
End: 2023-11-17
Payer: COMMERCIAL

## 2023-11-17 DIAGNOSIS — R10.9 STOMACH ACHE: Primary | ICD-10-CM

## 2023-11-30 ENCOUNTER — OFFICE VISIT (OUTPATIENT)
Dept: OTOLARYNGOLOGY | Facility: CLINIC | Age: 53
End: 2023-11-30
Payer: COMMERCIAL

## 2023-11-30 ENCOUNTER — LAB (OUTPATIENT)
Dept: LAB | Facility: HOSPITAL | Age: 53
End: 2023-11-30
Payer: COMMERCIAL

## 2023-11-30 VITALS
BODY MASS INDEX: 32.42 KG/M2 | DIASTOLIC BLOOD PRESSURE: 86 MMHG | TEMPERATURE: 97 F | HEIGHT: 60 IN | HEART RATE: 86 BPM | SYSTOLIC BLOOD PRESSURE: 159 MMHG

## 2023-11-30 DIAGNOSIS — M95.0 NASAL VALVE COLLAPSE: ICD-10-CM

## 2023-11-30 DIAGNOSIS — J34.2 DEVIATED NASAL SEPTUM: ICD-10-CM

## 2023-11-30 DIAGNOSIS — F17.210 CONTINUOUS DEPENDENCE ON CIGARETTE SMOKING: ICD-10-CM

## 2023-11-30 DIAGNOSIS — E05.00 GRAVES DISEASE: ICD-10-CM

## 2023-11-30 DIAGNOSIS — Z72.0 TOBACCO ABUSE: ICD-10-CM

## 2023-11-30 DIAGNOSIS — E03.9 HYPOTHYROIDISM (ACQUIRED): Primary | ICD-10-CM

## 2023-11-30 DIAGNOSIS — R05.3 CHRONIC COUGH: ICD-10-CM

## 2023-11-30 DIAGNOSIS — E06.3 HASHIMOTO'S THYROIDITIS: ICD-10-CM

## 2023-11-30 DIAGNOSIS — E06.0 ACUTE THYROIDITIS: ICD-10-CM

## 2023-11-30 DIAGNOSIS — E04.1 THYROID NODULE: ICD-10-CM

## 2023-11-30 DIAGNOSIS — E03.9 HYPOTHYROIDISM (ACQUIRED): ICD-10-CM

## 2023-11-30 DIAGNOSIS — I25.2 HISTORY OF MI (MYOCARDIAL INFARCTION): ICD-10-CM

## 2023-11-30 LAB
T3FREE SERPL-MCNC: 3.22 PG/ML (ref 2–4.4)
T4 FREE SERPL-MCNC: 1.63 NG/DL (ref 0.93–1.7)
TSH SERPL DL<=0.05 MIU/L-ACNC: 1.97 UIU/ML (ref 0.27–4.2)

## 2023-11-30 PROCEDURE — 84445 ASSAY OF TSI GLOBULIN: CPT

## 2023-11-30 PROCEDURE — 84443 ASSAY THYROID STIM HORMONE: CPT

## 2023-11-30 PROCEDURE — 36415 COLL VENOUS BLD VENIPUNCTURE: CPT

## 2023-11-30 PROCEDURE — 84481 FREE ASSAY (FT-3): CPT

## 2023-11-30 PROCEDURE — 84439 ASSAY OF FREE THYROXINE: CPT

## 2023-11-30 PROCEDURE — 86376 MICROSOMAL ANTIBODY EACH: CPT

## 2023-11-30 RX ORDER — POLYETHYLENE GLYCOL 3350 17 G/17G
POWDER, FOR SOLUTION ORAL
COMMUNITY
Start: 2023-10-17

## 2023-11-30 RX ORDER — IBUPROFEN 800 MG/1
TABLET ORAL
COMMUNITY
Start: 2023-11-27

## 2023-11-30 RX ORDER — ACETAMINOPHEN 500 MG
2 TABLET ORAL 3 TIMES DAILY PRN
COMMUNITY
Start: 2023-10-06

## 2023-11-30 RX ORDER — PENICILLIN V POTASSIUM 500 MG/1
TABLET ORAL
COMMUNITY
Start: 2023-11-27

## 2023-11-30 RX ORDER — LEVOTHYROXINE SODIUM 0.07 MG/1
TABLET ORAL
COMMUNITY
End: 2023-11-30 | Stop reason: SDUPTHER

## 2023-11-30 RX ORDER — LEVOTHYROXINE SODIUM 0.07 MG/1
TABLET ORAL
Qty: 1 TABLET | Refills: 0 | Status: SHIPPED | OUTPATIENT
Start: 2023-11-30

## 2023-11-30 RX ORDER — PSEUDOEPHEDRINE HCL 30 MG
TABLET ORAL
COMMUNITY
Start: 2023-10-17
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
## 2023-11-30 NOTE — PATIENT INSTRUCTIONS
1.  Regarding her hypothyroidism I do not think we have gotten her lab after she had been on medication.  Therefore I am going to order a battery of lab tests for thyroid and see her back for follow-up and see whether we need to adjust the dosing.  2.  He has pretty extensive Hashimoto's disease as TPO on last test was over 600.  Also TSI is elevated above 100 as well.  3.  Patient thyroid nodule is actually may be part of a multinodular goiter but however at this point I recommend a follow-up thyroid ultrasound.  4.  Regarding chronic cough which may be related to cigarette smoking but also may be due to thyroiditis.  I am going to go ahead and perform a flexible nasolaryngoscopy look at that area today.  5.  Patient has history of MI in 2018 with 1 stent.  Since then patient has been on Plavix and aspirin.  6.  She complains of chronic nasal congestion but actually she has a deviated nasal septum with the tip caudally pointing to the right side in the left nostril and then the septum deviates to the left.  She also has nasal valve collapse which does not make the opening any better.  She has a dorsal crease consistent with allergy as well.  7.  Flexible nasolaryngoscopy was completely normal.  Therefore again confirms the fact that maybe her cough is due to the thyroid problems.  I will see her back after the labs are done and we will determine further as to what needs to be done.

## 2023-12-01 ENCOUNTER — HOSPITAL ENCOUNTER (OUTPATIENT)
Dept: CT IMAGING | Facility: HOSPITAL | Age: 53
Discharge: HOME | End: 2023-12-01
Admitting: FAMILY MEDICINE
Payer: COMMERCIAL

## 2023-12-01 DIAGNOSIS — R10.9 STOMACH ACHE: ICD-10-CM

## 2023-12-01 LAB — THYROPEROXIDASE AB SERPL-ACNC: >600 IU/ML (ref 0–34)

## 2023-12-01 PROCEDURE — 74176 CT ABD & PELVIS W/O CONTRAST: CPT

## 2023-12-02 LAB — TSI SER-ACNC: 35.6 IU/L (ref 0–0.55)

## 2023-12-06 ENCOUNTER — OFFICE VISIT (OUTPATIENT)
Dept: OTOLARYNGOLOGY | Facility: CLINIC | Age: 53
End: 2023-12-06
Payer: COMMERCIAL

## 2023-12-06 VITALS
HEART RATE: 85 BPM | HEIGHT: 60 IN | BODY MASS INDEX: 32.42 KG/M2 | SYSTOLIC BLOOD PRESSURE: 166 MMHG | DIASTOLIC BLOOD PRESSURE: 78 MMHG | TEMPERATURE: 97.4 F

## 2023-12-06 DIAGNOSIS — E06.3 HASHIMOTO'S THYROIDITIS: ICD-10-CM

## 2023-12-06 DIAGNOSIS — E05.00 GRAVES DISEASE: ICD-10-CM

## 2023-12-06 DIAGNOSIS — E04.1 THYROID NODULE: ICD-10-CM

## 2023-12-06 DIAGNOSIS — E03.9 HYPOTHYROIDISM (ACQUIRED): Primary | ICD-10-CM

## 2023-12-06 RX ORDER — LEVOTHYROXINE SODIUM 0.07 MG/1
TABLET ORAL
Qty: 90 TABLET | Refills: 2 | Status: SHIPPED | OUTPATIENT
Start: 2023-12-06

## 2023-12-06 NOTE — PATIENT INSTRUCTIONS
1.  I have discussed the extent of Graves' disease and Hashimoto's disease which both have basically advanced through the limit.  However patient is still being able to be controlled for hypothyroidism and 75 mcg of levothyroxine daily.  She has not developed so much of the symptoms with dysphagia although she has some chronic cough.  She also has a small nodule to read for but will repeat ultrasound next year.  2.  I have given patient option of surgical intervention with total thyroidectomy versus observation and continue medical therapy.  I think patient agrees to proceed with the second option observation.  If her symptoms should get worse in the meantime we will readdress having surgical intervention.  3.  I will repeat the ultrasound in a year.  Otherwise continue the levothyroxine at 75 mcg p.o. daily and I refilled that today with 2 more refills of 90 tablets.
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
## 2023-12-11 ENCOUNTER — TRANSCRIBE ORDERS (OUTPATIENT)
Dept: ADMINISTRATIVE | Facility: HOSPITAL | Age: 53
End: 2023-12-11
Payer: COMMERCIAL

## 2023-12-11 DIAGNOSIS — Z12.31 ENCOUNTER FOR SCREENING MAMMOGRAM FOR MALIGNANT NEOPLASM OF BREAST: Primary | ICD-10-CM

## 2023-12-11 DIAGNOSIS — N95.1 MENOPAUSAL STATE: ICD-10-CM

## 2023-12-14 ENCOUNTER — TELEMEDICINE (OUTPATIENT)
Dept: PSYCHIATRY | Facility: CLINIC | Age: 53
End: 2023-12-14
Payer: COMMERCIAL

## 2023-12-14 DIAGNOSIS — F33.1 MAJOR DEPRESSIVE DISORDER, RECURRENT EPISODE, MODERATE: Primary | ICD-10-CM

## 2023-12-14 DIAGNOSIS — F41.1 GENERALIZED ANXIETY DISORDER: ICD-10-CM

## 2023-12-14 NOTE — PROGRESS NOTES
"This provider is located at the [facility] (through [facility]), [address], [facility], [address] using a secure [employer] Video Visit through [employer]. Patient is being seen remotely via telehealth at their home address in [place], and stated they are in a secure environment for this session. The patient's condition being diagnosed/treated is appropriate for telemedicine. The provider identified himself as well as his credentials.   The patient consent to be seen remotely, and when consent is given they understand that the consent allows for patient identifiable information to be sent to a third party as needed.   They may refuse to be seen remotely at any time. The electronic data is encrypted and password protected, and the patient  has been advised of the potential risks to privacy not withstanding such measures.    You have chosen to receive care through a telehealth visit.  Do you consent to use a video/audio connection for your medical care today? Yes    Patient identifiers utilized: Name and date of birth.    Patient verbally confirmed consent for today's encounter- yes    Subjective   Zoya Crook is a 53 y.o. female who presents today for follow-up appointment.     Chief Complaint:  Depression, anxiety    History of Present Illness:     Mood  \"Depends on the day. Lots days better, some not\"  Feeling down at times  Back pain is stressor  Back injections help  Was sick a couple weeks ago  Sleeps \"okay\"  Energy \"okay\"  Family coming over for New Years [place]   Anxiety  \"I think its okay\"  \"Everything has calmed down around me\"  Denies excessive worries   Substance use: n  Medication compliant  Side effects: n  Refills needed    Prior Psychiatric Medications:  Sertraline, prozac, paxil      The following portions of the patient's history were reviewed and updated as appropriate: allergies, current medications, past family history, past medical history, past social " history, past surgical history and problem list.    Allergy:   Allergies   Allergen Reactions    Acetaminophen Unknown - High Severity    Naproxen Other (See Comments)     GI UPSET/ HX ULCER    Sulfamethoxazole Other (See Comments)    Ciprofloxacin Rash        Current Medications:   Current Outpatient Medications   Medication Sig Dispense Refill    acetaminophen (TYLENOL) 500 MG tablet Take 2 tablets by mouth 3 (Three) Times a Day As Needed.      amLODIPine (NORVASC) 10 MG tablet Take 1 tablet every day by oral route for 90 days.      aspirin 81 MG chewable tablet aspirin 81 mg chewable tablet      atorvastatin (LIPITOR) 80 MG tablet Take 1 tablet by mouth Every Night. 30 tablet 0    brompheniramine-pseudoephedrine-DM 30-2-10 MG/5ML syrup Take 5 mL by mouth 4 (Four) Times a Day As Needed for Allergies. 118 mL 0    clopidogrel (PLAVIX) 75 MG tablet Take 1 tablet by mouth Daily.      cyclobenzaprine (FLEXERIL) 10 MG tablet       docusate sodium 100 MG capsule       DULoxetine (CYMBALTA) 30 MG capsule Take 1 capsule by mouth Daily. 60 capsule 2    fluticasone (FLONASE) 50 MCG/ACT nasal spray       hydrOXYzine pamoate (VISTARIL) 25 MG capsule Take 1 capsule by mouth Daily As Needed for Anxiety. 30 capsule 2    ibuprofen (ADVIL,MOTRIN) 800 MG tablet       levothyroxine (SYNTHROID, LEVOTHROID) 75 MCG tablet Take 1 tablet by mouth every morning on an empty stomach 1 hour before eating or drinking. 90 tablet 2    mirtazapine (REMERON) 7.5 MG tablet Take 1 tablet by mouth Every Night. 30 tablet 2    omeprazole (priLOSEC) 40 MG capsule Take 1 capsule by mouth Daily. (Patient not taking: Reported on 11/30/2023)      ondansetron ODT (ZOFRAN-ODT) 4 MG disintegrating tablet Place 1 tablet on the tongue Every 6 (Six) Hours As Needed for Vomiting. 15 tablet 0    pantoprazole (PROTONIX) 40 MG EC tablet Every 12 (Twelve) Hours. (Patient not taking: Reported on 11/30/2023)      penicillin v potassium (VEETID) 500 MG tablet        polyethylene glycol (MIRALAX) 17 GM/SCOOP powder       promethazine (PHENERGAN) 25 MG suppository Insert 1 suppository into the rectum Every 6 (Six) Hours As Needed for Nausea or Vomiting. 6 suppository 0    propranolol LA (INDERAL LA) 80 MG 24 hr capsule       sucralfate (CARAFATE) 1 g tablet Take 1 tablet by mouth 3 (Three) Times a Day With Meals. May dissolve tablets in a small amount of water to help with swallowing 28 tablet 0    traMADol (ULTRAM) 50 MG tablet Take 1 tablet by mouth Every 8 (Eight) Hours As Needed for Severe Pain. 21 tablet 0     No current facility-administered medications for this visit.       Mental Status Exam:   Hygiene:   good  Cooperation:  Cooperative  Eye Contact:  Good  Psychomotor Behavior:  Appropriate  Affect:  Full range  Mood: normal  Hopelessness: Denies  Speech:  Normal  Thought Process:  Goal directed  Thought Content:  Normal  Suicidal:  None  Homicidal:  None  Hallucinations:  None  Delusion:  None  Memory:  Intact  Orientation:  Grossly intact  Reliability:  good  Insight:  Good  Judgement:  Good  Impulse Control:  Good  Physical/Medical Issues:  No      Physical Exam:   There were no vitals taken for this visit. There is no height or weight on file to calculate BMI.   Due to the remote nature of this encounter (virtual encounter), vitals were unable to be obtained.  Weight change: n    PHQ-9 Depression Screening  Little interest or pleasure in doing things? 1-->several days   Feeling down, depressed, or hopeless? 1-->several days   Trouble falling or staying asleep, or sleeping too much? 1-->several days   Feeling tired or having little energy? 1-->several days   Poor appetite or overeating? 1-->several days   Feeling bad about yourself - or that you are a failure or have let yourself or your family down? 1-->several days   Trouble concentrating on things, such as reading the newspaper or watching television? 1-->several days   Moving or speaking so slowly that other  people could have noticed? Or the opposite - being so fidgety or restless that you have been moving around a lot more than usual? 1-->several days   Thoughts that you would be better off dead, or of hurting yourself in some way? 0-->not at all   PHQ-9 Total Score 8   If you checked off any problems, how difficult have these problems made it for you to do your work, take care of things at home, or get along with other people? somewhat difficult     PHQ-9 Total Score: 8    Feeling nervous, anxious or on edge: (P) Several days  Not being able to stop or control worrying: (P) Several days  Worrying too much about different things: (P) Several days  Trouble Relaxing: (P) Nearly every day  Being so restless that it is hard to sit still: (P) Several days  Feeling afraid as if something awful might happen: (P) Nearly every day  Becoming easily annoyed or irritable: (P) Several days  ROSS 7 Total Score: (P) 11  If you checked any problems, how difficult have these problems made it for you to do your work, take care of things at home, or get along with other people: (P) Very difficult    Previous available Provider notes and records reviewed by this APRN at today's encounter.     Visit Diagnoses:    ICD-10-CM ICD-9-CM   1. Major depressive disorder, recurrent episode, moderate  F33.1 296.32   2. Generalized anxiety disorder  F41.1 300.02       TREATMENT PLAN: Continue supportive psychotherapy efforts and medications.  Medication and treatment options, both pharmacological and non-pharmacological treatment options, discussed during today's visit, including any off label use of medication. Patient acknowledged and verbally consented with current treatment plan and was educated on the importance of compliance with treatment and follow-up appointments.      - Continue mirtazapine 7.5mg po qhs to target depression and anxiety  - Continue duloxetine 30mg po qday to target depression and anxiety    Labs: None ordered at this  time  Therapy: Trudi Osvaldoderik    MEDICATION ISSUES:  Discussed treatment plan and medication options of prescribed medication as well as the risks, benefits, any black box warnings, and side effects including potential falls, possible impaired driving, and metabolic adversities among others, including any off label use of medication. Patient is agreeable to call the office with any worsening of symptoms or onset of side effects, or if any concerns or questions arise.  The contact information for the office is made available to the patient. Patient is agreeable to call 911 or go to the nearest ER should they begin having any SI/HI, or if any urgent concerns arise. No medication side effects or related complaints today. OSIEL reviewed as expected.    RISK ASSESSMENT:  Risk of self-harm acutely is moderate.  Risk factors include anxiety disorder, mood disorder, and recent psychosocial stressors (pandemic). Protective factors include no family history, denies access to guns/weapons, no present SI, no history of suicide attempts or self-harm in the past, minimal AODA, healthcare seeking, future orientation, willingness to engage in care.  Risk of self-harm chronically is also moderate, but could be further elevated in the event of treatment noncompliance and/or AODA.    VERBAL INFORMED CONSENT FOR MEDICATION:  The patient was educated that their proposed/prescribed psychotropic medication(s) has potential risks, side effects, adverse effects, and black box warnings; and these have been discussed with the patient.  The patient has been informed that their treatment and medication dosage is to be individualized, and may even be above or below the recommended range/dosage due to patient individualization and response, but medication is prescribed using a shared decision making approach, and no medication or dosage will be prescribed without the patient's verbal consent.  The reason for the use of the medication including  any off label use and alternative modes of treatment other than or in addition to medication has been considered and discussed, the probable consequences of not receiving the proposed treatment have been discussed, and any treatment side effects, black box warnings, and cautions associated with treatment have been discussed with the patient.  The patient is allowed ample time to openly discuss and ask questions regarding the proposed medication(s) and treatment plan and the patient verbalizes understanding the reasons for the use of the medication, its potential risks and benefits, other alternative treatment(s), and the probable consequences that may occur if the proposed medication is not given.  The patient has been given ample time to ask questions and study the information and find the information to be specific, accurate, and complete.  The patient gives verbal consent for the medication(s) proposed/prescribed, they verbalized understanding that they can refuse and withdraw consent at any time with the assistance of this APRN, and the patient has verbally confirmed that they are aware, and are willing, to take the prescribed medication and follow the treatment plan with the known possible risks, side effect, black box warnings, and any potential medication interactions, and the patient reports they will be worse off without this medication and treatment plan.  The patient is advised to contact this APRN/this office if any questions or concerns arise at any time (at [phone]), or call 911/go to the closest emergency department if needed or outside of office hours.      [facility] No Show Policy:  We understand unexpected circumstances arise; however, anytime you miss your appointment we are unable to provide you appropriate care.  In addition, each appointment missed could have been used to provide care for others.  We ask that you call at least 24 hours in advance to cancel or  reschedule an appointment.  We would like to take this opportunity to remind you of our policy stating patients who miss THREE or more appointments without cancelling or rescheduling 24 hours in advance of the appointment may be subject to cancellation of any further visits with our clinic and recommendation to seek in-person services/visits.    Please call [phone] to reschedule your appointment. If there are reasons that make it difficult for you to keep the appointments, please call and let us know how we can help.  Please understand that medication prescribing will not continue without seeing your provider.      [facility]'s No Show Policy reviewed with patient at today's visit. Patient verbalized understanding of this policy. Discussed with patient that in the event that there are three or more no show visits, it will be recommended that they pursue in-person services/visits as noncompliance with telehealth visits indicates that patient is not an appropriate candidate for telemedicine and would likely be more appropriate for in-person services/visits. Patient verbalizes understanding and is agreeable to this.    MEDS ORDERED DURING VISIT:  No orders of the defined types were placed in this encounter.      Return in about 4 weeks (around 1/11/2024) for Next scheduled follow up.         Progress toward goal: Not at goal    Functional Status: No impairment    Prognosis: Good with Ongoing Treatment     This document has been electronically signed by ZAYNAB Sinclair  December 14, 2023 13:37 EST      Please note that portions of this note were completed with a voice recognition program.

## 2023-12-20 ENCOUNTER — OFFICE VISIT (OUTPATIENT)
Dept: PSYCHIATRY | Facility: CLINIC | Age: 53
End: 2023-12-20
Payer: COMMERCIAL

## 2023-12-20 DIAGNOSIS — F32.A ANXIETY AND DEPRESSION: Primary | ICD-10-CM

## 2023-12-20 DIAGNOSIS — F41.9 ANXIETY AND DEPRESSION: Primary | ICD-10-CM
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
## 2024-01-02 ENCOUNTER — OFFICE VISIT (OUTPATIENT)
Dept: PSYCHIATRY | Facility: CLINIC | Age: 54
End: 2024-01-02
Payer: COMMERCIAL

## 2024-01-02 DIAGNOSIS — F41.9 ANXIETY AND DEPRESSION: Primary | ICD-10-CM

## 2024-01-02 DIAGNOSIS — F32.A ANXIETY AND DEPRESSION: Primary | ICD-10-CM

## 2024-01-02 PROCEDURE — 1160F RVW MEDS BY RX/DR IN RCRD: CPT | Performed by: SOCIAL WORKER

## 2024-01-02 PROCEDURE — 1159F MED LIST DOCD IN RCRD: CPT | Performed by: SOCIAL WORKER

## 2024-01-02 PROCEDURE — 90837 PSYTX W PT 60 MINUTES: CPT | Performed by: SOCIAL WORKER

## 2024-01-02 NOTE — PROGRESS NOTES
Progress Note    Date: 01/02/2024  Time In: 03:00pm  Time Out: 03:55pm     Patient Legal Name: Zoya Crook  Patient Age: 53 y.o.    CHIEF COMPLAINT: depression and anxiety     Subjective   History of Present Illness     Zoya returns today for ongoing treatment.  Goals from our most recent session on 12/20/23 were to   Continue to challenge negative self talk that contributes to anxiety   Focus on continued self care, compliance with all treatment providers and being active in follow up  \Psychotherapy for symptom relief.     Assessment    Mental Status Exam     Appearance: good hygiene and dressed appropriately for the weather  Behavior: calm  Cooperation:  engaged, cooperative, attentive, and friendly  Eye Contact:  good  Affect:  restricted  Mood: expressive, fearful, and cautious  Speech: responsive and talkative  Thought Process:  linear  Thought Content: appropriate  Suicidal: denies  Homicidal:  denies  Hallucinations:  denies  Memory:  intact  Orientation:  person, place, time, and situation  Reliability:  reliable  Insight:  good  Judgment:  good     Clinical Intervention       ICD-10-CM ICD-9-CM   1. Anxiety and depression  F41.9 300.00    F32.A 311       Zoya is a 53 y.o. female who presents today as a follow-up for continued psychotherapy. Individual psychotherapy was provided utilizing solution focused techniques to restore adaptive functioning, provide symptom relief, support self-esteem, promote emotion regulation, discuss values and strengths, manage stress, identify triggers, recognize patterns of behavior, acknowledge sources of feelings and behaviors, identify strengths, build confidence, assess symptoms, challenge negative thinking patterns, recognize cognitive distortions, provide support, and discuss interpersonal conflicts. Zoya continues to report some frustration with various physical ailments. She says that at times going to appointments gets overwhelming.  Discussed importance of taking care  "of self, recognizing improvements with treatment. She reports treatment compliance . Discussed challenge of anxiety, over thinking about future.  Discussed her past trauma and realization that she has survived difficult times in the past.  She was able to say, \"I will survive anything\"  Encouraged being  present focused.  Plan   Plan & Goals     Challenge over thinking and negative thoughts  Incorporate \"I will survive anything\" into daily practices.     Patient acknowledged and verbally consented to continue working toward resolving current treatment plan goals and was educated on the importance of participation in the therapeutic process.  Patient will remain compliant with medication regimen as prescribed. Discuss any medication side effects, questions or concerns with prescribing provider.  Call 911 or present to the nearest emergency room in an emergency situation.  National Suicide Prevention Lifeline: Call 988. The Lifeline provides 24/7, free and confidential support for people in distress, prevention and crisis resources.  Crisis Text Line  Text HOME To 581814    Return in about 2 weeks (around 1/16/2024).    ____________________  This document has been electronically signed by Trudi Almeida LCSW  January 2, 2024 16:01 EST    Part of this note may be an electronic transcription/translation of spoken language to printed text using the Dragon Dictation System.      "
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
## 2024-01-09 DIAGNOSIS — F33.1 MAJOR DEPRESSIVE DISORDER, RECURRENT EPISODE, MODERATE: ICD-10-CM

## 2024-01-09 RX ORDER — MIRTAZAPINE 7.5 MG/1
7.5 TABLET, FILM COATED ORAL NIGHTLY
Qty: 30 TABLET | Refills: 2 | Status: SHIPPED | OUTPATIENT
Start: 2024-01-09

## 2024-01-11 ENCOUNTER — TELEMEDICINE (OUTPATIENT)
Dept: PSYCHIATRY | Facility: CLINIC | Age: 54
End: 2024-01-11
Payer: COMMERCIAL

## 2024-01-11 DIAGNOSIS — F41.1 GENERALIZED ANXIETY DISORDER: Primary | ICD-10-CM

## 2024-01-11 DIAGNOSIS — F33.1 MAJOR DEPRESSIVE DISORDER, RECURRENT EPISODE, MODERATE: ICD-10-CM

## 2024-01-11 RX ORDER — DULOXETIN HYDROCHLORIDE 30 MG/1
30 CAPSULE, DELAYED RELEASE ORAL DAILY
Qty: 60 CAPSULE | Refills: 2 | Status: SHIPPED | OUTPATIENT
Start: 2024-01-11

## 2024-01-11 NOTE — PROGRESS NOTES
"This provider is located at the [facility] (through [facility]), [address], [facility], [address] using a secure [address] [employer] Video Visit through [employer]. Patient is being seen remotely via telehealth at their home address in [place], and stated they are in a secure environment for this session. The patient's condition being diagnosed/treated is appropriate for telemedicine. The provider identified himself as well as his credentials.   The patient consent to be seen remotely, and when consent is given they understand that the consent allows for patient identifiable information to be sent to a third party as needed.   They may refuse to be seen remotely at any time. The electronic data is encrypted and password protected, and the patient  has been advised of the potential risks to privacy not withstanding such measures.    You have chosen to receive care through a telehealth visit.  Do you consent to use a video/audio connection for your medical care today? Yes    Patient identifiers utilized: Name and date of birth.    Patient verbally confirmed consent for today's encounter- yes    Subjective   Zoya Crook is a 53 y.o. female who presents today for follow-up appointment.     Chief Complaint:  Depression, anxiety    History of Present Illness:     Patient reports feeling down at times over the past month. Pain is a big stressor for patient, but did have injections recently that helped some. Enjoyed spending the holidays with her family. Endorses low interest at times. Sleeping well. Energy has been low. Anxiety is \"still there a little bit.\" Endorses excessive worries at times. Able to relax. Feels as though medications are helping.     Prior Psychiatric Medications:  Sertraline, prozac, paxil       The following portions of the patient's history were reviewed and updated as appropriate: allergies, current medications, past family history, past medical history, past social " history, past surgical history and problem list.    Allergy:   Allergies   Allergen Reactions    Acetaminophen Unknown - High Severity    Naproxen Other (See Comments)     GI UPSET/ HX ULCER    Sulfamethoxazole Other (See Comments)    Ciprofloxacin Rash        Current Medications:   Current Outpatient Medications   Medication Sig Dispense Refill    DULoxetine (CYMBALTA) 30 MG capsule Take 1 capsule by mouth Daily. 60 capsule 2    acetaminophen (TYLENOL) 500 MG tablet Take 2 tablets by mouth 3 (Three) Times a Day As Needed.      amLODIPine (NORVASC) 10 MG tablet Take 1 tablet every day by oral route for 90 days.      aspirin 81 MG chewable tablet aspirin 81 mg chewable tablet      atorvastatin (LIPITOR) 80 MG tablet Take 1 tablet by mouth Every Night. 30 tablet 0    brompheniramine-pseudoephedrine-DM 30-2-10 MG/5ML syrup Take 5 mL by mouth 4 (Four) Times a Day As Needed for Allergies. 118 mL 0    clopidogrel (PLAVIX) 75 MG tablet Take 1 tablet by mouth Daily.      cyclobenzaprine (FLEXERIL) 10 MG tablet       docusate sodium 100 MG capsule       fluticasone (FLONASE) 50 MCG/ACT nasal spray       hydrOXYzine pamoate (VISTARIL) 25 MG capsule Take 1 capsule by mouth Daily As Needed for Anxiety. 30 capsule 2    ibuprofen (ADVIL,MOTRIN) 800 MG tablet       levothyroxine (SYNTHROID, LEVOTHROID) 75 MCG tablet Take 1 tablet by mouth every morning on an empty stomach 1 hour before eating or drinking. 90 tablet 2    mirtazapine (REMERON) 7.5 MG tablet TAKE 1 TABLET BY MOUTH EVERY NIGHT 30 tablet 2    omeprazole (priLOSEC) 40 MG capsule Take 1 capsule by mouth Daily. (Patient not taking: Reported on 11/30/2023)      ondansetron ODT (ZOFRAN-ODT) 4 MG disintegrating tablet Place 1 tablet on the tongue Every 6 (Six) Hours As Needed for Vomiting. 15 tablet 0    pantoprazole (PROTONIX) 40 MG EC tablet Every 12 (Twelve) Hours. (Patient not taking: Reported on 11/30/2023)      penicillin v potassium (VEETID) 500 MG tablet        polyethylene glycol (MIRALAX) 17 GM/SCOOP powder       promethazine (PHENERGAN) 25 MG suppository Insert 1 suppository into the rectum Every 6 (Six) Hours As Needed for Nausea or Vomiting. 6 suppository 0    propranolol LA (INDERAL LA) 80 MG 24 hr capsule       sucralfate (CARAFATE) 1 g tablet Take 1 tablet by mouth 3 (Three) Times a Day With Meals. May dissolve tablets in a small amount of water to help with swallowing 28 tablet 0    traMADol (ULTRAM) 50 MG tablet Take 1 tablet by mouth Every 8 (Eight) Hours As Needed for Severe Pain. 21 tablet 0     No current facility-administered medications for this visit.       Mental Status Exam:   Hygiene:   good  Cooperation:  Cooperative  Eye Contact:  Good  Psychomotor Behavior:  Appropriate  Affect:  Full range  Mood: normal  Hopelessness: Denies  Speech:  Normal  Thought Process:  Goal directed  Thought Content:  Normal  Suicidal:  None  Homicidal:  None  Hallucinations:  None  Delusion:  None  Memory:  Intact  Orientation:  Grossly intact  Reliability:  good  Insight:  Good  Judgement:  Good  Impulse Control:  Good  Physical/Medical Issues:  No      Physical Exam:   There were no vitals taken for this visit. There is no height or weight on file to calculate BMI.   Due to the remote nature of this encounter (virtual encounter), vitals were unable to be obtained.  Weight change: n    PHQ-9 Depression Screening  Little interest or pleasure in doing things? (P) 98-->patient unable to answer   Feeling down, depressed, or hopeless? (P) 98-->patient unable to answer   Trouble falling or staying asleep, or sleeping too much?     Feeling tired or having little energy? (P) 1-->several days   Poor appetite or overeating? (P) 3-->nearly every day   Feeling bad about yourself - or that you are a failure or have let yourself or your family down? (P) 1-->several days   Trouble concentrating on things, such as reading the newspaper or watching television? (P) 1-->several days   Moving  or speaking so slowly that other people could have noticed? Or the opposite - being so fidgety or restless that you have been moving around a lot more than usual? (P) 1-->several days   Thoughts that you would be better off dead, or of hurting yourself in some way? (P) 0-->not at all   PHQ-9 Total Score     If you checked off any problems, how difficult have these problems made it for you to do your work, take care of things at home, or get along with other people? (P) very difficult     PHQ-9 Total Score:      Feeling nervous, anxious or on edge: (P) Several days  Not being able to stop or control worrying: (P) Several days  Worrying too much about different things: (P) Several days  Trouble Relaxing: (P) Nearly every day  Being so restless that it is hard to sit still: (P) More than half the days  Feeling afraid as if something awful might happen: (P) Several days  Becoming easily annoyed or irritable: (P) Several days  ROSS 7 Total Score: (P) 10  If you checked any problems, how difficult have these problems made it for you to do your work, take care of things at home, or get along with other people: (P) Very difficult    Previous available Provider notes and records reviewed by this APRN at today's encounter.     Visit Diagnoses:    ICD-10-CM ICD-9-CM   1. Generalized anxiety disorder  F41.1 300.02   2. Major depressive disorder, recurrent episode, moderate  F33.1 296.32       TREATMENT PLAN: Continue supportive psychotherapy efforts and medications.  Medication and treatment options, both pharmacological and non-pharmacological treatment options, discussed during today's visit, including any off label use of medication. Patient acknowledged and verbally consented with current treatment plan and was educated on the importance of compliance with treatment and follow-up appointments.      - Continue duloxetine 30mg po qday to target depression and anxiety  - Continue mirtazapine 7.5mg po qhs to target depression  and anxiety    Labs: None ordered at this time  Therapy: Defers    MEDICATION ISSUES:  Discussed treatment plan and medication options of prescribed medication as well as the risks, benefits, any black box warnings, and side effects including potential falls, possible impaired driving, and metabolic adversities among others, including any off label use of medication. Patient is agreeable to call the office with any worsening of symptoms or onset of side effects, or if any concerns or questions arise.  The contact information for the office is made available to the patient. Patient is agreeable to call 911 or go to the nearest ER should they begin having any SI/HI, or if any urgent concerns arise. No medication side effects or related complaints today. OSIEL reviewed as expected.    RISK ASSESSMENT:  Risk of self-harm acutely is moderate.  Risk factors include anxiety disorder, mood disorder, and recent psychosocial stressors (pandemic). Protective factors include no family history, denies access to guns/weapons, no present SI, no history of suicide attempts or self-harm in the past, minimal AODA, healthcare seeking, future orientation, willingness to engage in care.  Risk of self-harm chronically is also moderate, but could be further elevated in the event of treatment noncompliance and/or AODA.    VERBAL INFORMED CONSENT FOR MEDICATION:  The patient was educated that their proposed/prescribed psychotropic medication(s) has potential risks, side effects, adverse effects, and black box warnings; and these have been discussed with the patient.  The patient has been informed that their treatment and medication dosage is to be individualized, and may even be above or below the recommended range/dosage due to patient individualization and response, but medication is prescribed using a shared decision making approach, and no medication or dosage will be prescribed without the patient's verbal consent.  The reason for the  use of the medication including any off label use and alternative modes of treatment other than or in addition to medication has been considered and discussed, the probable consequences of not receiving the proposed treatment have been discussed, and any treatment side effects, black box warnings, and cautions associated with treatment have been discussed with the patient.  The patient is allowed ample time to openly discuss and ask questions regarding the proposed medication(s) and treatment plan and the patient verbalizes understanding the reasons for the use of the medication, its potential risks and benefits, other alternative treatment(s), and the probable consequences that may occur if the proposed medication is not given.  The patient has been given ample time to ask questions and study the information and find the information to be specific, accurate, and complete.  The patient gives verbal consent for the medication(s) proposed/prescribed, they verbalized understanding that they can refuse and withdraw consent at any time with the assistance of this APRN, and the patient has verbally confirmed that they are aware, and are willing, to take the prescribed medication and follow the treatment plan with the known possible risks, side effect, black box warnings, and any potential medication interactions, and the patient reports they will be worse off without this medication and treatment plan.  The patient is advised to contact this APRN/this office if any questions or concerns arise at any time (at [phone]), or call 911/go to the closest emergency department if needed or outside of office hours.      [facility] No Show Policy:  We understand unexpected circumstances arise; however, anytime you miss your appointment we are unable to provide you appropriate care.  In addition, each appointment missed could have been used to provide care for others.  We ask that you call at least 24  hours in advance to cancel or reschedule an appointment.  We would like to take this opportunity to remind you of our policy stating patients who miss THREE or more appointments without cancelling or rescheduling 24 hours in advance of the appointment may be subject to cancellation of any further visits with our clinic and recommendation to seek in-person services/visits.    Please call [phone] to reschedule your appointment. If there are reasons that make it difficult for you to keep the appointments, please call and let us know how we can help.  Please understand that medication prescribing will not continue without seeing your provider.      [facility]'s No Show Policy reviewed with patient at today's visit. Patient verbalized understanding of this policy. Discussed with patient that in the event that there are three or more no show visits, it will be recommended that they pursue in-person services/visits as noncompliance with telehealth visits indicates that patient is not an appropriate candidate for telemedicine and would likely be more appropriate for in-person services/visits. Patient verbalizes understanding and is agreeable to this.    MEDS ORDERED DURING VISIT:  New Medications Ordered This Visit   Medications    DULoxetine (CYMBALTA) 30 MG capsule     Sig: Take 1 capsule by mouth Daily.     Dispense:  60 capsule     Refill:  2       Return in about 4 weeks (around 2/8/2024) for Next scheduled follow up.         Progress toward goal: At goal    Functional Status: No impairment    Prognosis: Good with Ongoing Treatment     This document has been electronically signed by ZAYNAB Sinclair  January 11, 2024 13:36 EST      Please note that portions of this note were completed with a voice recognition program.

## 2024-02-02 ENCOUNTER — OFFICE VISIT (OUTPATIENT)
Dept: GASTROENTEROLOGY | Facility: CLINIC | Age: 54
End: 2024-02-02
Payer: COMMERCIAL

## 2024-02-02 VITALS
HEIGHT: 60 IN | DIASTOLIC BLOOD PRESSURE: 90 MMHG | BODY MASS INDEX: 31.77 KG/M2 | SYSTOLIC BLOOD PRESSURE: 171 MMHG | WEIGHT: 161.8 LBS | OXYGEN SATURATION: 100 % | HEART RATE: 74 BPM

## 2024-02-02 DIAGNOSIS — R14.0 BLOATING: ICD-10-CM

## 2024-02-02 DIAGNOSIS — K59.00 CONSTIPATION, UNSPECIFIED CONSTIPATION TYPE: ICD-10-CM

## 2024-02-02 DIAGNOSIS — R19.4 CHANGE IN BOWEL HABITS: Primary | ICD-10-CM

## 2024-02-02 RX ORDER — POLYETHYLENE GLYCOL 3350, SODIUM CHLORIDE, SODIUM BICARBONATE, POTASSIUM CHLORIDE 420; 11.2; 5.72; 1.48 G/4L; G/4L; G/4L; G/4L
4000 POWDER, FOR SOLUTION ORAL ONCE
Qty: 4000 ML | Refills: 0 | Status: SHIPPED | OUTPATIENT
Start: 2024-02-02 | End: 2024-02-02
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
## 2024-02-06 NOTE — PROGRESS NOTES
"Progress Note    Date: 02/08/2024  Time In: 1:50  Time Out: 2:50    Patient Legal Name: Zoya Crook  Patient Age: 53 y.o.    CHIEF COMPLAINT: depression and anxiety     Subjective   History of Present Illness       Zoya returns today for ongoing treatmnet.  Goals from our most recent session on 01/02/24 were:  Challenge over thinking and negative thoughts  Incorporate \"I will survive anything\" into daily practices.    Assessment    Mental Status Exam     Appearance: good hygiene and dressed appropriately for the weather  Behavior: calm  Cooperation:  engaged, cooperative, attentive, and friendly  Eye Contact:  good  Affect:  congruent, angry, and sad  Mood: expressive, sad, and cautious  Speech: responsive and talkative  Thought Process:  organized  Thought Content: appropriate  Suicidal: denies  Homicidal:  denies  Hallucinations:  denies  Memory:  intact  Orientation:  person, place, time, and situation  Reliability:  reliable  Insight:  good  Judgment:  good     Clinical Intervention       ICD-10-CM ICD-9-CM   1. Anxiety and depression  F41.9 300.00    F32.A 311        Zoya is a 53 y.o. female who presents today as a follow-up for continued psychotherapy. Individual psychotherapy was provided utilizing solution focused  techniques to promote problem-solving, provide symptom relief, encourage expression of thoughts and feelings, support self-esteem, encourage change talk, discuss values and strengths, manage stress, identify triggers, recognize patterns of behavior, acknowledge sources of feelings and behaviors, challenge negative thinking patterns, provide support, and discuss interpersonal conflicts. Zoya continues to voice concerns over health related issues. She has upcoming procedures and appointments scheduled.  She continues to have stress and anxiety about issues related to family members   She reports anxiety and sleeplessness at times due to thoughts and concerns.  Discussed setting limits and spending " more time and focus on relationships that provide  a sense of fulfillment.      Plan   Plan & Goals     Continue to recognize and set healthy limits  Focus on the relationships that bring positive emotional connection.   Psychotherapy for symptom relief ongoing     Patient acknowledged and verbally consented to continue working toward resolving current treatment plan goals and was educated on the importance of participation in the therapeutic process.  Patient will remain compliant with medication regimen as prescribed. Discuss any medication side effects, questions or concerns with prescribing provider.  Call 911 or present to the nearest emergency room in an emergency situation.  National Suicide Prevention Lifeline: Call 988. The Lifeline provides 24/7, free and confidential support for people in distress, prevention and crisis resources.  Crisis Text Line  Text HOME To 825811    Return in about 3 weeks (around 2/29/2024).    ____________________  This document has been electronically signed by Trudi Almeida LCSW  February 8, 2024 14:58 EST

## 2024-02-08 ENCOUNTER — OFFICE VISIT (OUTPATIENT)
Dept: PSYCHIATRY | Facility: CLINIC | Age: 54
End: 2024-02-08
Payer: COMMERCIAL

## 2024-02-08 DIAGNOSIS — F41.9 ANXIETY AND DEPRESSION: Primary | ICD-10-CM

## 2024-02-08 DIAGNOSIS — F32.A ANXIETY AND DEPRESSION: Primary | ICD-10-CM

## 2024-02-08 NOTE — PSYCHOTHERAPY NOTE
Bach ache     Colonoscopy    Stress then I dont sleep at all     Mostly thinking about the kids, how I can't help    I think she is depressed, she is stuck with kids    I want to help, she takes, does not respect   Suboxone or methadone ?    I dont talk, she argues ,     Set limits

## 2024-02-09 ENCOUNTER — TRANSCRIBE ORDERS (OUTPATIENT)
Dept: ADMINISTRATIVE | Facility: HOSPITAL | Age: 54
End: 2024-02-09
Payer: COMMERCIAL

## 2024-02-09 DIAGNOSIS — N28.1 ACQUIRED CYST OF KIDNEY: Primary | ICD-10-CM

## 2024-02-12 ENCOUNTER — TELEPHONE (OUTPATIENT)
Dept: GASTROENTEROLOGY | Facility: CLINIC | Age: 54
End: 2024-02-12
Payer: COMMERCIAL

## 2024-02-12 ENCOUNTER — TELEMEDICINE (OUTPATIENT)
Dept: PSYCHIATRY | Facility: CLINIC | Age: 54
End: 2024-02-12
Payer: COMMERCIAL

## 2024-02-12 DIAGNOSIS — F33.1 MAJOR DEPRESSIVE DISORDER, RECURRENT EPISODE, MODERATE: Primary | ICD-10-CM

## 2024-02-12 DIAGNOSIS — F41.1 GENERALIZED ANXIETY DISORDER: ICD-10-CM

## 2024-02-12 PROCEDURE — 1160F RVW MEDS BY RX/DR IN RCRD: CPT | Performed by: NURSE PRACTITIONER

## 2024-02-12 PROCEDURE — 99214 OFFICE O/P EST MOD 30 MIN: CPT | Performed by: NURSE PRACTITIONER

## 2024-02-12 PROCEDURE — 1159F MED LIST DOCD IN RCRD: CPT | Performed by: NURSE PRACTITIONER

## 2024-02-22 NOTE — PROGRESS NOTES
Progress Note    Date: 02/26/2024  Time In: 3:46  Time Out: 4:45    Patient Legal Name: Zoya Crook  Patient Age: 53 y.o.    CHIEF COMPLAINT: mood     Subjective   History of Present Illness     Zoya returns today for ongoing treatment  Goals from our previous session on02/08/23 were :  Continue to recognize and set healthy limits  Focus on the relationships that bring positive emotional connection.   Psychotherapy for symptom relief ongoing     Assessment    Mental Status Exam     Appearance: good hygiene and dressed appropriately for the weather  Behavior: calm  Cooperation:  engaged, cooperative, attentive, and friendly  Eye Contact:  good  Affect:  congruent  Mood: expressive  Speech: responsive  Thought Process:  organized  Thought Content: appropriate  Suicidal: denies  Homicidal:  denies  Hallucinations:  denies  Memory:  intact  Orientation:  person, place, time, and situation  Reliability:  reliable  Insight:  good  Judgment:  good     Clinical Intervention       ICD-10-CM ICD-9-CM   1. Anxiety and depression  F41.9 300.00    F32.A 311        Zoya is a 53 y.o. female who presents today as a follow-up for continued psychotherapy. Individual psychotherapy was provided utilizing  solution focused techniques to restore adaptive functioning, provide symptom relief, encourage expression of thoughts and feelings, support self-esteem, discuss values and strengths, manage stress, identify triggers, recognize patterns of behavior, acknowledge sources of feelings and behaviors, identify strengths, build confidence, assess symptoms, and provide support. Zoya has ongoing appointments with providers to address many health concerns.  She sees some improvement with current medication for treatment of thyroid disease and GI discomfort.  Zoya reports stability with current treatment.  At this time she feels that she has sat appropriate limits with family.  She reports that she is able to recognize that this was necessary and  she also expresses awareness of her strength and resiliency.     Plan   Plan & Goals     Continue  to recognize strength to boost self esteem and mood.   Psychotherapy to relieve symptom burden     Patient acknowledged and verbally consented to continue working toward resolving current treatment plan goals and was educated on the importance of participation in the therapeutic process.  Patient will remain compliant with medication regimen as prescribed. Discuss any medication side effects, questions or concerns with prescribing provider.  Call 911 or present to the nearest emergency room in an emergency situation.  National Suicide Prevention Lifeline: Call 988. The Lifeline provides 24/7, free and confidential support for people in distress, prevention and crisis resources.  Crisis Text Line  Text HOME To 714550    No follow-ups on file.    ____________________  This document has been electronically signed by Trudi Almeida LCSW  February 26, 2024 17:05 EST    Part of this note may be an electronic transcription/translation of spoken language to printed text using the Dragon Dictation System.

## 2024-02-26 ENCOUNTER — OFFICE VISIT (OUTPATIENT)
Dept: PSYCHIATRY | Facility: CLINIC | Age: 54
End: 2024-02-26
Payer: COMMERCIAL

## 2024-02-26 DIAGNOSIS — F41.9 ANXIETY AND DEPRESSION: Primary | ICD-10-CM

## 2024-02-26 DIAGNOSIS — F32.A ANXIETY AND DEPRESSION: Primary | ICD-10-CM

## 2024-02-26 PROCEDURE — 1160F RVW MEDS BY RX/DR IN RCRD: CPT | Performed by: SOCIAL WORKER

## 2024-02-26 PROCEDURE — 1159F MED LIST DOCD IN RCRD: CPT | Performed by: SOCIAL WORKER

## 2024-02-26 PROCEDURE — 90837 PSYTX W PT 60 MINUTES: CPT | Performed by: SOCIAL WORKER

## 2024-02-26 NOTE — PSYCHOTHERAPY NOTE
Meds for thyroid and med for stomach helps      Think dght better ,  I told her one more time,  argue with me I call the   recently had that conversation    Oldest of 8    Anything can happen, I will be ok, stronger than a horse     Recognize achievements ,

## 2024-02-28 ENCOUNTER — HOSPITAL ENCOUNTER (EMERGENCY)
Facility: HOSPITAL | Age: 54
Discharge: HOME | End: 2024-02-28
Attending: EMERGENCY MEDICINE | Admitting: EMERGENCY MEDICINE
Payer: COMMERCIAL

## 2024-02-28 ENCOUNTER — APPOINTMENT (OUTPATIENT)
Dept: CT IMAGING | Facility: HOSPITAL | Age: 54
End: 2024-02-28
Payer: COMMERCIAL

## 2024-02-28 ENCOUNTER — APPOINTMENT (OUTPATIENT)
Dept: GENERAL RADIOLOGY | Facility: HOSPITAL | Age: 54
End: 2024-02-28
Payer: COMMERCIAL

## 2024-02-28 VITALS
RESPIRATION RATE: 18 BRPM | BODY MASS INDEX: 26.82 KG/M2 | HEIGHT: 65 IN | OXYGEN SATURATION: 92 % | SYSTOLIC BLOOD PRESSURE: 128 MMHG | TEMPERATURE: 98.9 F | WEIGHT: 161 LBS | HEART RATE: 93 BPM | DIASTOLIC BLOOD PRESSURE: 65 MMHG

## 2024-02-28 DIAGNOSIS — N28.89 RENAL MASS, RIGHT: ICD-10-CM

## 2024-02-28 DIAGNOSIS — J40 BRONCHITIS: Primary | ICD-10-CM

## 2024-02-28 LAB
ALBUMIN SERPL-MCNC: 3.5 G/DL (ref 3.5–5.2)
ALBUMIN/GLOB SERPL: 0.6 G/DL
ALP SERPL-CCNC: 74 U/L (ref 39–117)
ALT SERPL W P-5'-P-CCNC: 10 U/L (ref 1–33)
ANION GAP SERPL CALCULATED.3IONS-SCNC: 9.8 MMOL/L (ref 5–15)
AST SERPL-CCNC: 14 U/L (ref 1–32)
BASOPHILS # BLD AUTO: 0.08 10*3/MM3 (ref 0–0.2)
BASOPHILS NFR BLD AUTO: 0.5 % (ref 0–1.5)
BILIRUB SERPL-MCNC: 0.2 MG/DL (ref 0–1.2)
BUN SERPL-MCNC: 8 MG/DL (ref 6–20)
BUN/CREAT SERPL: 11.4 (ref 7–25)
CALCIUM SPEC-SCNC: 9.2 MG/DL (ref 8.6–10.5)
CHLORIDE SERPL-SCNC: 104 MMOL/L (ref 98–107)
CO2 SERPL-SCNC: 24.2 MMOL/L (ref 22–29)
CREAT SERPL-MCNC: 0.7 MG/DL (ref 0.57–1)
DEPRECATED RDW RBC AUTO: 46.5 FL (ref 37–54)
EGFRCR SERPLBLD CKD-EPI 2021: 102.3 ML/MIN/1.73
EOSINOPHIL # BLD AUTO: 0.13 10*3/MM3 (ref 0–0.4)
EOSINOPHIL NFR BLD AUTO: 0.8 % (ref 0.3–6.2)
ERYTHROCYTE [DISTWIDTH] IN BLOOD BY AUTOMATED COUNT: 16.3 % (ref 12.3–15.4)
FLUAV SUBTYP SPEC NAA+PROBE: NOT DETECTED
FLUBV RNA ISLT QL NAA+PROBE: NOT DETECTED
GLOBULIN UR ELPH-MCNC: 5.6 GM/DL
GLUCOSE SERPL-MCNC: 95 MG/DL (ref 65–99)
HCT VFR BLD AUTO: 37.2 % (ref 34–46.6)
HGB BLD-MCNC: 11.9 G/DL (ref 12–15.9)
HOLD SPECIMEN: NORMAL
HOLD SPECIMEN: NORMAL
IMM GRANULOCYTES # BLD AUTO: 0.05 10*3/MM3 (ref 0–0.05)
IMM GRANULOCYTES NFR BLD AUTO: 0.3 % (ref 0–0.5)
LYMPHOCYTES # BLD AUTO: 3.1 10*3/MM3 (ref 0.7–3.1)
LYMPHOCYTES NFR BLD AUTO: 19.8 % (ref 19.6–45.3)
MCH RBC QN AUTO: 25.1 PG (ref 26.6–33)
MCHC RBC AUTO-ENTMCNC: 32 G/DL (ref 31.5–35.7)
MCV RBC AUTO: 78.3 FL (ref 79–97)
MONOCYTES # BLD AUTO: 0.91 10*3/MM3 (ref 0.1–0.9)
MONOCYTES NFR BLD AUTO: 5.8 % (ref 5–12)
NEUTROPHILS NFR BLD AUTO: 11.36 10*3/MM3 (ref 1.7–7)
NEUTROPHILS NFR BLD AUTO: 72.8 % (ref 42.7–76)
NRBC BLD AUTO-RTO: 0 /100 WBC (ref 0–0.2)
NT-PROBNP SERPL-MCNC: 113.8 PG/ML (ref 0–900)
PLATELET # BLD AUTO: 318 10*3/MM3 (ref 140–450)
PMV BLD AUTO: 11.6 FL (ref 6–12)
POTASSIUM SERPL-SCNC: 3.6 MMOL/L (ref 3.5–5.2)
PROT SERPL-MCNC: 9.1 G/DL (ref 6–8.5)
RBC # BLD AUTO: 4.75 10*6/MM3 (ref 3.77–5.28)
RSV RNA NPH QL NAA+NON-PROBE: NOT DETECTED
S PYO AG THROAT QL: NEGATIVE
SARS-COV-2 RNA RESP QL NAA+PROBE: NOT DETECTED
SODIUM SERPL-SCNC: 138 MMOL/L (ref 136–145)
TROPONIN T SERPL HS-MCNC: 6 NG/L
WBC NRBC COR # BLD AUTO: 15.63 10*3/MM3 (ref 3.4–10.8)
WHOLE BLOOD HOLD COAG: NORMAL
WHOLE BLOOD HOLD SPECIMEN: NORMAL

## 2024-02-28 PROCEDURE — 99285 EMERGENCY DEPT VISIT HI MDM: CPT

## 2024-02-28 PROCEDURE — 96375 TX/PRO/DX INJ NEW DRUG ADDON: CPT

## 2024-02-28 PROCEDURE — 87880 STREP A ASSAY W/OPTIC: CPT | Performed by: EMERGENCY MEDICINE

## 2024-02-28 PROCEDURE — 85025 COMPLETE CBC W/AUTO DIFF WBC: CPT | Performed by: EMERGENCY MEDICINE

## 2024-02-28 PROCEDURE — 93010 ELECTROCARDIOGRAM REPORT: CPT | Performed by: INTERNAL MEDICINE

## 2024-02-28 PROCEDURE — 83880 ASSAY OF NATRIURETIC PEPTIDE: CPT | Performed by: EMERGENCY MEDICINE

## 2024-02-28 PROCEDURE — 93005 ELECTROCARDIOGRAM TRACING: CPT | Performed by: EMERGENCY MEDICINE

## 2024-02-28 PROCEDURE — 25010000002 METHYLPREDNISOLONE PER 125 MG

## 2024-02-28 PROCEDURE — 80053 COMPREHEN METABOLIC PANEL: CPT | Performed by: EMERGENCY MEDICINE

## 2024-02-28 PROCEDURE — 96374 THER/PROPH/DIAG INJ IV PUSH: CPT

## 2024-02-28 PROCEDURE — 71260 CT THORAX DX C+: CPT

## 2024-02-28 PROCEDURE — 25510000001 IOPAMIDOL PER 1 ML: Performed by: EMERGENCY MEDICINE

## 2024-02-28 PROCEDURE — 87081 CULTURE SCREEN ONLY: CPT | Performed by: EMERGENCY MEDICINE

## 2024-02-28 PROCEDURE — 84484 ASSAY OF TROPONIN QUANT: CPT | Performed by: EMERGENCY MEDICINE

## 2024-02-28 PROCEDURE — 93005 ELECTROCARDIOGRAM TRACING: CPT

## 2024-02-28 PROCEDURE — 71045 X-RAY EXAM CHEST 1 VIEW: CPT

## 2024-02-28 PROCEDURE — 25010000002 KETOROLAC TROMETHAMINE PER 15 MG

## 2024-02-28 PROCEDURE — 87637 SARSCOV2&INF A&B&RSV AMP PRB: CPT | Performed by: EMERGENCY MEDICINE

## 2024-02-28 RX ORDER — METHYLPREDNISOLONE 4 MG/1
TABLET ORAL
Qty: 21 TABLET | Refills: 0 | Status: SHIPPED | OUTPATIENT
Start: 2024-02-28 | End: 2024-03-04

## 2024-02-28 RX ORDER — KETOROLAC TROMETHAMINE 30 MG/ML
30 INJECTION, SOLUTION INTRAMUSCULAR; INTRAVENOUS ONCE
Status: COMPLETED | OUTPATIENT
Start: 2024-02-28 | End: 2024-02-28

## 2024-02-28 RX ORDER — AZITHROMYCIN 250 MG/1
TABLET, FILM COATED ORAL
Qty: 6 TABLET | Refills: 0 | Status: SHIPPED | OUTPATIENT
Start: 2024-02-28 | End: 2024-03-03

## 2024-02-28 RX ORDER — SODIUM CHLORIDE 0.9 % (FLUSH) 0.9 %
10 SYRINGE (ML) INJECTION AS NEEDED
Status: DISCONTINUED | OUTPATIENT
Start: 2024-02-28 | End: 2024-02-28 | Stop reason: HOSPADM

## 2024-02-28 RX ORDER — METHYLPREDNISOLONE SODIUM SUCCINATE 125 MG/2ML
125 INJECTION, POWDER, LYOPHILIZED, FOR SOLUTION INTRAMUSCULAR; INTRAVENOUS ONCE
Status: COMPLETED | OUTPATIENT
Start: 2024-02-28 | End: 2024-02-28

## 2024-02-28 RX ORDER — BROMPHENIRAMINE MALEATE, PSEUDOEPHEDRINE HYDROCHLORIDE, AND DEXTROMETHORPHAN HYDROBROMIDE 2; 30; 10 MG/5ML; MG/5ML; MG/5ML
10 SYRUP ORAL ONCE
Status: COMPLETED | OUTPATIENT
Start: 2024-02-28 | End: 2024-02-28

## 2024-02-28 RX ADMIN — IOPAMIDOL 60 ML: 755 INJECTION, SOLUTION INTRAVENOUS at 20:34

## 2024-02-28 RX ADMIN — METHYLPREDNISOLONE SODIUM SUCCINATE 125 MG: 125 INJECTION INTRAMUSCULAR; INTRAVENOUS at 20:13

## 2024-02-28 RX ADMIN — KETOROLAC TROMETHAMINE 30 MG: 30 INJECTION, SOLUTION INTRAMUSCULAR; INTRAVENOUS at 20:13

## 2024-02-28 RX ADMIN — BROMPHENIRAMINE MALEATE, PSEUDOEPHEDRINE HYDROCHLORIDE, AND DEXTROMETHORPHAN HYDROBROMIDE 10 ML: 2; 30; 10 SYRUP ORAL at 20:13

## 2024-02-29 ENCOUNTER — HOSPITAL ENCOUNTER (OUTPATIENT)
Dept: CT IMAGING | Facility: HOSPITAL | Age: 54
Discharge: HOME | End: 2024-02-29
Admitting: FAMILY MEDICINE
Payer: COMMERCIAL

## 2024-02-29 DIAGNOSIS — N28.1 ACQUIRED CYST OF KIDNEY: ICD-10-CM

## 2024-02-29 LAB
CREAT BLDA-MCNC: 0.8 MG/DL (ref 0.6–1.3)
EGFRCR SERPLBLD CKD-EPI 2021: 87.1 ML/MIN/1.73
QT INTERVAL: 358 MS
QTC INTERVAL: 442 MS

## 2024-02-29 PROCEDURE — 25510000001 IOPAMIDOL PER 1 ML: Performed by: FAMILY MEDICINE

## 2024-02-29 PROCEDURE — 82565 ASSAY OF CREATININE: CPT

## 2024-02-29 PROCEDURE — 74178 CT ABD&PLV WO CNTR FLWD CNTR: CPT

## 2024-02-29 RX ADMIN — IOPAMIDOL 100 ML: 755 INJECTION, SOLUTION INTRAVENOUS at 09:28

## 2024-02-29 NOTE — DISCHARGE INSTRUCTIONS
Lab work, cardiac markers, EKG and chest x-ray  CT shows that you have some emphysema  CT also shows that you have a renal mass on your right kidney, this could be a cyst.  Please follow-up with your PCP for outpatient ultrasound
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
## 2024-03-01 LAB — BACTERIA SPEC AEROBE CULT: NORMAL

## 2024-03-05 ENCOUNTER — TELEPHONE (OUTPATIENT)
Dept: GASTROENTEROLOGY | Facility: CLINIC | Age: 54
End: 2024-03-05
Payer: COMMERCIAL

## 2024-03-07 ENCOUNTER — HOSPITAL ENCOUNTER (OUTPATIENT)
Dept: MAMMOGRAPHY | Facility: HOSPITAL | Age: 54
Discharge: HOME | End: 2024-03-07
Payer: COMMERCIAL

## 2024-03-07 ENCOUNTER — HOSPITAL ENCOUNTER (OUTPATIENT)
Dept: BONE DENSITY | Facility: HOSPITAL | Age: 54
Discharge: HOME | End: 2024-03-07
Payer: COMMERCIAL

## 2024-03-07 DIAGNOSIS — N95.1 MENOPAUSAL STATE: ICD-10-CM

## 2024-03-07 DIAGNOSIS — Z12.31 ENCOUNTER FOR SCREENING MAMMOGRAM FOR MALIGNANT NEOPLASM OF BREAST: ICD-10-CM

## 2024-03-07 PROCEDURE — 77080 DXA BONE DENSITY AXIAL: CPT

## 2024-03-07 PROCEDURE — 77067 SCR MAMMO BI INCL CAD: CPT

## 2024-03-07 PROCEDURE — 77063 BREAST TOMOSYNTHESIS BI: CPT

## 2024-03-11 ENCOUNTER — HOSPITAL ENCOUNTER (OUTPATIENT)
Dept: ULTRASOUND IMAGING | Facility: HOSPITAL | Age: 54
Discharge: HOME | End: 2024-03-11
Admitting: OTOLARYNGOLOGY
Payer: COMMERCIAL

## 2024-03-11 DIAGNOSIS — E04.1 THYROID NODULE: ICD-10-CM

## 2024-03-11 PROCEDURE — 76536 US EXAM OF HEAD AND NECK: CPT

## 2024-03-18 ENCOUNTER — OFFICE VISIT (OUTPATIENT)
Dept: PSYCHIATRY | Facility: CLINIC | Age: 54
End: 2024-03-18
Payer: COMMERCIAL

## 2024-03-18 DIAGNOSIS — F41.9 ANXIETY AND DEPRESSION: Primary | ICD-10-CM

## 2024-03-18 DIAGNOSIS — F32.A ANXIETY AND DEPRESSION: Primary | ICD-10-CM

## 2024-03-18 NOTE — PSYCHOTHERAPY NOTE
Sick with bronchitis   Smoking less,  I try the patch   Maybe talk to  about meds to quit smoking     I want to live    Hard time leaving house,   sttay home too much     Gardening     Alejandra flores     As soon as [facility] talks, upsets me     Drama,  setting limits with her

## 2024-03-18 NOTE — PROGRESS NOTES
"Progress Note    Date: 03/18/2024  Time In: 03:59  Time Out: 04:52    Patient Legal Name: Zoya Crook  Patient Age: 53 y.o.    CHIEF COMPLAINT: depression     Subjective   History of Present Illness     Zoya returns today for ongoing treatment  Goals from our previous session on 02/26/24 were: Continue  to recognize strength to boost self esteem and mood.   Psychotherapy to relieve symptom burden        Assessment    Mental Status Exam     Appearance: good hygiene and dressed appropriately for the weather  Behavior: calm  Cooperation:  engaged, cooperative, attentive, and friendly  Eye Contact:  good  Affect:  congruent  Mood: expressive  Speech: responsive and talkative  Thought Process:  organized  Thought Content: appropriate  Suicidal: denies  Homicidal:  denies  Hallucinations:  denies  Memory:  intact  Orientation:  person, place, time, and situation  Reliability:  reliable  Insight:  good  Judgment:  good     Clinical Intervention       ICD-10-CM ICD-9-CM   1. Anxiety and depression  F41.9 300.00    F32.A 311       Zoya is a 53 y.o. female who presents today as a follow-up for continued psychotherapy. Individual psychotherapy was provided utilizing solution focused techniques to provide symptom relief, support self-esteem, discuss values and strengths, manage stress, identify triggers, recognize patterns of behavior, acknowledge sources of feelings and behaviors, build confidence, assess symptoms, provide support, and discuss interpersonal conflicts. Zoya has struggled recently with bronchitis.  She is feeling some better, hopeful for continued improvement.  Her symptoms related to anxiety and depression are better.  She states that she is able to feel \"happy \" now.  She does sdtill have some ongoing struggles but is setting limits with others and avoiding relationships that contribute to her stress.      Plan   Plan & Goals     Supportive therapy to relieve symptom burden  Continue to recognize healthy " boundaries, set boundaries and maintain to decress stress and anxiety.     Patient acknowledged and verbally consented to continue working toward resolving current treatment plan goals and was educated on the importance of participation in the therapeutic process.  Patient will remain compliant with medication regimen as prescribed. Discuss any medication side effects, questions or concerns with prescribing provider.  Call 911 or present to the nearest emergency room in an emergency situation.  National Suicide Prevention Lifeline: Call 988. The Lifeline provides 24/7, free and confidential support for people in distress, prevention and crisis resources.  Crisis Text Line  Text HOME To 440772    No follow-ups on file.    ____________________  This document has been electronically signed by Trudi Almeida LCSW  March 18, 2024 17:01 EDT    Part of this note may be an electronic transcription/translation of spoken language to printed text using the Dragon Dictation System.

## 2024-03-26 NOTE — PRE-PROCEDURE INSTRUCTIONS
"Instructed on date and arrival time of 0930. Come to entrance \"C\". Must have  over age [address] to drive home.  May have two visitors; however, children under 12 must stay in waiting room.  Discussed clear liquid diet (no red or purple), bowel prep, and NPO.  May take medications as usual except for blood thinners, diabetic medications, and weight loss medications.  Verbalized understanding of instructions given.  Instructed to call for questions or concerns.  Hold Plavix for 7 days prior to procedure.  Cardiac and blood thinner clearances noted in chart.  "

## 2024-03-28 ENCOUNTER — TELEMEDICINE (OUTPATIENT)
Dept: PSYCHIATRY | Facility: CLINIC | Age: 54
End: 2024-03-28
Payer: COMMERCIAL

## 2024-03-28 DIAGNOSIS — F33.1 MAJOR DEPRESSIVE DISORDER, RECURRENT EPISODE, MODERATE: ICD-10-CM

## 2024-03-28 DIAGNOSIS — F41.1 GENERALIZED ANXIETY DISORDER: Primary | ICD-10-CM

## 2024-03-28 RX ORDER — MIRTAZAPINE 7.5 MG/1
7.5 TABLET, FILM COATED ORAL NIGHTLY
Qty: 30 TABLET | Refills: 2 | Status: ON HOLD | OUTPATIENT
Start: 2024-03-28 | End: 2024-04-01

## 2024-03-28 RX ORDER — DULOXETINE 40 MG/1
40 CAPSULE, DELAYED RELEASE ORAL DAILY
Qty: 30 CAPSULE | Refills: 2 | Status: SHIPPED | OUTPATIENT
Start: 2024-03-28

## 2024-03-28 NOTE — PROGRESS NOTES
"This provider is located at the [facility] (through [facility]), [address], [place], [address] using a secure [employer] Video Visit through [employer]. Patient is being seen remotely via telehealth at their home address in [place], and stated they are in a secure environment for this session. The patient's condition being diagnosed/treated is appropriate for telemedicine. The provider identified himself as well as his credentials.   The patient consent to be seen remotely, and when consent is given they understand that the consent allows for patient identifiable information to be sent to a third party as needed.   They may refuse to be seen remotely at any time. The electronic data is encrypted and password protected, and the patient  has been advised of the potential risks to privacy not withstanding such measures.    You have chosen to receive care through a telehealth visit.  Do you consent to use a video/audio connection for your medical care today? Yes    Patient identifiers utilized: Name and date of birth.    Patient verbally confirmed consent for today's encounter- yes    Subjective   Zoya Crook is a 53 y.o. female who presents today for follow-up appointment.     Chief Complaint:  Depression, anxiety    History of Present Illness:     Patient reports feeling down more over the past month. Increase in stress with family. Endorses low interest. Sleeping okay. Energy levels have been low. Anxiety has been high at times, due to situational stressors. States, \"its easy to make me nervous.\" Endorses excessive worries. Trouble relaxing some days.    Prior Psychiatric Medications:  Sertraline, prozac, paxil, duloxetine, mirtazapine     The following portions of the patient's history were reviewed and updated as appropriate: allergies, current medications, past family history, past medical history, past social history, past surgical history and problem list.    Allergy: "   Allergies   Allergen Reactions    Acetaminophen Unknown - High Severity    Naproxen Other (See Comments)     GI UPSET/ HX ULCER    Sulfamethoxazole Other (See Comments)    Ciprofloxacin Rash        Current Medications:   Current Outpatient Medications   Medication Sig Dispense Refill    DULoxetine 40 MG capsule delayed-release particles Take 1 capsule by mouth Daily. 30 capsule 2    amLODIPine (NORVASC) 10 MG tablet Take 1 tablet every day by oral route for 90 days.      aspirin 81 MG chewable tablet aspirin 81 mg chewable tablet      atorvastatin (LIPITOR) 80 MG tablet Take 1 tablet by mouth Every Night. 30 tablet 0    clopidogrel (PLAVIX) 75 MG tablet Take 1 tablet by mouth Daily.      fluticasone (FLONASE) 50 MCG/ACT nasal spray       hydrOXYzine pamoate (VISTARIL) 25 MG capsule Take 1 capsule by mouth Daily As Needed for Anxiety. 30 capsule 2    linaclotide (Linzess) 145 MCG capsule capsule Take 1 capsule by mouth Every Morning Before Breakfast for 90 days. 90 capsule 1    mirtazapine (REMERON) 7.5 MG tablet Take 1 tablet by mouth Every Night. 30 tablet 2    propranolol LA (INDERAL LA) 80 MG 24 hr capsule        No current facility-administered medications for this visit.       Mental Status Exam:   Hygiene:   good  Cooperation:  Cooperative  Eye Contact:  Good  Psychomotor Behavior:  Appropriate  Affect:  Full range  Mood: normal  Hopelessness: Denies  Speech:  Normal  Thought Process:  Goal directed  Thought Content:  Normal  Suicidal:  None  Homicidal:  None  Hallucinations:  None  Delusion:  None  Memory:  Intact  Orientation:  Grossly intact  Reliability:  good  Insight:  Good  Judgement:  Good  Impulse Control:  Good  Physical/Medical Issues:  No      Physical Exam:   There were no vitals taken for this visit. There is no height or weight on file to calculate BMI.   Due to the remote nature of this encounter (virtual encounter), vitals were unable to be obtained.  Weight change: n    PHQ-9 Depression  Screening  Little interest or pleasure in doing things? (P) 98-->patient unable to answer   Feeling down, depressed, or hopeless? (P) 98-->patient unable to answer   Trouble falling or staying asleep, or sleeping too much?     Feeling tired or having little energy?     Poor appetite or overeating? (P) 3-->nearly every day   Feeling bad about yourself - or that you are a failure or have let yourself or your family down? (P) 3-->nearly every day   Trouble concentrating on things, such as reading the newspaper or watching television? (P) 3-->nearly every day   Moving or speaking so slowly that other people could have noticed? Or the opposite - being so fidgety or restless that you have been moving around a lot more than usual? (P) 2-->more than half the days   Thoughts that you would be better off dead, or of hurting yourself in some way? (P) 0-->not at all   PHQ-9 Total Score     If you checked off any problems, how difficult have these problems made it for you to do your work, take care of things at home, or get along with other people? (P) somewhat difficult     PHQ-9 Total Score:      Feeling nervous, anxious or on edge: (P) Several days  Not being able to stop or control worrying: (P) Several days  Worrying too much about different things: (P) Several days  Trouble Relaxing: (P) Nearly every day  Being so restless that it is hard to sit still: (P) More than half the days  Feeling afraid as if something awful might happen: (P) Several days  Becoming easily annoyed or irritable: (P) Several days  ROSS 7 Total Score: (P) 10  If you checked any problems, how difficult have these problems made it for you to do your work, take care of things at home, or get along with other people: (P) Somewhat difficult    Previous available Provider notes and records reviewed by this APRN at today's encounter.     Visit Diagnoses:    ICD-10-CM ICD-9-CM   1. Generalized anxiety disorder  F41.1 300.02   2. Major depressive disorder,  recurrent episode, moderate  F33.1 296.32       TREATMENT PLAN: Continue supportive psychotherapy efforts and medications.  Medication and treatment options, both pharmacological and non-pharmacological treatment options, discussed during today's visit, including any off label use of medication. Patient acknowledged and verbally consented with current treatment plan and was educated on the importance of compliance with treatment and follow-up appointments.      - Increase duloxetine 30mg to 40mg po qday to target depression and anxiety  - Continue mirtazapine 7.5mg po qhs to target depression and anxiety    Labs: None ordered at this time  Therapy: Defers    MEDICATION ISSUES:  Discussed treatment plan and medication options of prescribed medication as well as the risks, benefits, any black box warnings, and side effects including potential falls, possible impaired driving, and metabolic adversities among others, including any off label use of medication. Patient is agreeable to call the office with any worsening of symptoms or onset of side effects, or if any concerns or questions arise.  The contact information for the office is made available to the patient. Patient is agreeable to call 911 or go to the nearest ER should they begin having any SI/HI, or if any urgent concerns arise. No medication side effects or related complaints today. OSIEL reviewed as expected.    RISK ASSESSMENT:  Risk of self-harm acutely is moderate.  Risk factors include anxiety disorder, mood disorder, and recent psychosocial stressors (pandemic). Protective factors include no family history, denies access to guns/weapons, no present SI, no history of suicide attempts or self-harm in the past, minimal AODA, healthcare seeking, future orientation, willingness to engage in care.  Risk of self-harm chronically is also moderate, but could be further elevated in the event of treatment noncompliance and/or AODA.    VERBAL INFORMED CONSENT FOR  MEDICATION:  The patient was educated that their proposed/prescribed psychotropic medication(s) has potential risks, side effects, adverse effects, and black box warnings; and these have been discussed with the patient.  The patient has been informed that their treatment and medication dosage is to be individualized, and may even be above or below the recommended range/dosage due to patient individualization and response, but medication is prescribed using a shared decision making approach, and no medication or dosage will be prescribed without the patient's verbal consent.  The reason for the use of the medication including any off label use and alternative modes of treatment other than or in addition to medication has been considered and discussed, the probable consequences of not receiving the proposed treatment have been discussed, and any treatment side effects, black box warnings, and cautions associated with treatment have been discussed with the patient.  The patient is allowed ample time to openly discuss and ask questions regarding the proposed medication(s) and treatment plan and the patient verbalizes understanding the reasons for the use of the medication, its potential risks and benefits, other alternative treatment(s), and the probable consequences that may occur if the proposed medication is not given.  The patient has been given ample time to ask questions and study the information and find the information to be specific, accurate, and complete.  The patient gives verbal consent for the medication(s) proposed/prescribed, they verbalized understanding that they can refuse and withdraw consent at any time with the assistance of this APRN, and the patient has verbally confirmed that they are aware, and are willing, to take the prescribed medication and follow the treatment plan with the known possible risks, side effect, black box warnings, and any potential medication interactions, and the patient  reports they will be worse off without this medication and treatment plan.  The patient is advised to contact this APRN/this office if any questions or concerns arise at any time (at [phone]), or call 911/go to the closest emergency department if needed or outside of office hours.      [facility] No Show Policy:  We understand unexpected circumstances arise; however, anytime you miss your appointment we are unable to provide you appropriate care.  In addition, each appointment missed could have been used to provide care for others.  We ask that you call at least 24 hours in advance to cancel or reschedule an appointment.  We would like to take this opportunity to remind you of our policy stating patients who miss THREE or more appointments without cancelling or rescheduling 24 hours in advance of the appointment may be subject to cancellation of any further visits with our clinic and recommendation to seek in-person services/visits.    Please call [phone] to reschedule your appointment. If there are reasons that make it difficult for you to keep the appointments, please call and let us know how we can help.  Please understand that medication prescribing will not continue without seeing your provider.      [facility]'s No Show Policy reviewed with patient at today's visit. Patient verbalized understanding of this policy. Discussed with patient that in the event that there are three or more no show visits, it will be recommended that they pursue in-person services/visits as noncompliance with telehealth visits indicates that patient is not an appropriate candidate for telemedicine and would likely be more appropriate for in-person services/visits. Patient verbalizes understanding and is agreeable to this.    MEDS ORDERED DURING VISIT:  New Medications Ordered This Visit   Medications    DULoxetine 40 MG capsule delayed-release particles     Sig: Take 1 capsule  by mouth Daily.     Dispense:  30 capsule     Refill:  2     Please d/c 30mg duloxetine po qday rx    mirtazapine (REMERON) 7.5 MG tablet     Sig: Take 1 tablet by mouth Every Night.     Dispense:  30 tablet     Refill:  2       Return in about 4 weeks (around 4/25/2024) for Next scheduled follow up.         Progress toward goal: Not at goal    Functional Status: No impairment    Prognosis: Good with Ongoing Treatment     This document has been electronically signed by ZAYNAB Sinclair  March 28, 2024 09:15 EDT      Please note that portions of this note were completed with a voice recognition program.

## 2024-03-29 ENCOUNTER — ANESTHESIA EVENT (OUTPATIENT)
Dept: GASTROENTEROLOGY | Facility: HOSPITAL | Age: 54
End: 2024-03-29
Payer: COMMERCIAL

## 2024-03-29 NOTE — ANESTHESIA PREPROCEDURE EVALUATION
Anesthesia Evaluation     Patient summary reviewed and Nursing notes reviewed   NPO Solid Status: > 8 hours  NPO Liquid Status: > 2 hours           Airway   Mallampati: I  TM distance: >3 FB  Neck ROM: full  No difficulty expected and Small opening  Dental    (+) upper dentures    Pulmonary - normal exam    breath sounds clear to auscultation  (+) a smoker Current, Abstained day of surgery,recent URI  Cardiovascular - normal exam  Exercise tolerance: good (4-7 METS)    ECG reviewed  PT is on anticoagulation therapy  Patient on routine beta blocker  Rhythm: regular  Rate: normal    (+) hypertension, past MI , CAD, hyperlipidemia    ROS comment: EKG 2/28/24  HEART RATE= 91  bpm  RR Interval= 656  ms  RI Interval= 138  ms  P Horizontal Axis= -21  deg  P Front Axis= 22  deg  QRSD Interval= 85  ms  QT Interval= 358  ms  QTcB= 442  ms  QRS Axis= 54  deg  T Wave Axis= 10  deg  - BORDERLINE ECG -  Sinus rhythm  Probable left atrial enlargement  When compared with ECG of 29-Sep-2023 22:51:52,  No significant change    11/26/22 ECHO    Calculated left ventricular EF = 58.2%  ·  Left ventricular wall thickness is consistent with mild septal asymmetric hypertrophy.  ·  Left ventricular diastolic function is consistent with (grade I) impaired relaxation.  ·  No hemodynamically significant valvular pathology.          Neuro/Psych  (+) TIA, CVA, headaches, numbness, psychiatric history  GI/Hepatic/Renal/Endo    (+) GERD, renal disease-, thyroid problem     Musculoskeletal     (+) neck pain  Abdominal  - normal exam    Bowel sounds: normal.   Substance History      OB/GYN      Comment:  Postmenopausal       Other   arthritis,     ROS/Med Hx Other: Plavix - last dose 3/29  Also takes ASA 81 mg                   Anesthesia Plan    ASA 2     general     (Total IV Anesthesia    Patient understands anesthesia not responsible for dental damage.  )  intravenous induction     Anesthetic plan, risks, benefits, and alternatives have been  provided, discussed and informed consent has been obtained with: patient.    Plan discussed with CRNA.    CODE STATUS:

## 2024-04-01 ENCOUNTER — HOSPITAL ENCOUNTER (OUTPATIENT)
Facility: HOSPITAL | Age: 54
Setting detail: HOSPITAL OUTPATIENT SURGERY
Discharge: HOME | End: 2024-04-01
Attending: INTERNAL MEDICINE | Admitting: INTERNAL MEDICINE
Payer: COMMERCIAL

## 2024-04-01 ENCOUNTER — ANESTHESIA (OUTPATIENT)
Dept: GASTROENTEROLOGY | Facility: HOSPITAL | Age: 54
End: 2024-04-01
Payer: COMMERCIAL

## 2024-04-01 VITALS
HEIGHT: 65 IN | DIASTOLIC BLOOD PRESSURE: 84 MMHG | WEIGHT: 154.32 LBS | TEMPERATURE: 98.7 F | SYSTOLIC BLOOD PRESSURE: 136 MMHG | RESPIRATION RATE: 15 BRPM | BODY MASS INDEX: 25.71 KG/M2 | OXYGEN SATURATION: 98 % | HEART RATE: 75 BPM

## 2024-04-01 DIAGNOSIS — K59.00 CONSTIPATION, UNSPECIFIED CONSTIPATION TYPE: ICD-10-CM

## 2024-04-01 DIAGNOSIS — R14.0 BLOATING: ICD-10-CM

## 2024-04-01 DIAGNOSIS — R19.4 CHANGE IN BOWEL HABITS: ICD-10-CM

## 2024-04-01 PROCEDURE — 25010000002 PROPOFOL 10 MG/ML EMULSION: Performed by: NURSE ANESTHETIST, CERTIFIED REGISTERED

## 2024-04-01 PROCEDURE — 25810000003 LACTATED RINGERS PER 1000 ML: Performed by: NURSE ANESTHETIST, CERTIFIED REGISTERED

## 2024-04-01 RX ORDER — SODIUM CHLORIDE, SODIUM LACTATE, POTASSIUM CHLORIDE, CALCIUM CHLORIDE 600; 310; 30; 20 MG/100ML; MG/100ML; MG/100ML; MG/100ML
30 INJECTION, SOLUTION INTRAVENOUS CONTINUOUS
Status: DISCONTINUED | OUTPATIENT
Start: 2024-04-01 | End: 2024-04-01 | Stop reason: HOSPADM

## 2024-04-01 RX ORDER — PROPOFOL 10 MG/ML
VIAL (ML) INTRAVENOUS AS NEEDED
Status: DISCONTINUED | OUTPATIENT
Start: 2024-04-01 | End: 2024-04-01 | Stop reason: SURG

## 2024-04-01 RX ADMIN — PROPOFOL 80 MG: 10 INJECTION, EMULSION INTRAVENOUS at 11:35

## 2024-04-01 RX ADMIN — SODIUM CHLORIDE, POTASSIUM CHLORIDE, SODIUM LACTATE AND CALCIUM CHLORIDE 30 ML/HR: 600; 310; 30; 20 INJECTION, SOLUTION INTRAVENOUS at 10:22

## 2024-04-01 RX ADMIN — PROPOFOL 200 MCG/KG/MIN: 10 INJECTION, EMULSION INTRAVENOUS at 11:35
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
## 2024-04-01 NOTE — ANESTHESIA POSTPROCEDURE EVALUATION
"Patient: Zoya Crook    Procedure Summary       Date: 04/01/24 Room / Location: [facility] ENDOSCOPY 4 / [facility] ENDOSCOPY    Anesthesia Start: 1134 Anesthesia Stop: 1154    Procedure: COLONOSCOPY Diagnosis:       Change in bowel habits      Constipation, unspecified constipation type      Bloating      (Change in bowel habits [R19.4])      (Constipation, unspecified constipation type [K59.00])      (Bloating [R14.0])    Surgeons: Kris Albarran MD Provider: Keyon Vargas CRNA    Anesthesia Type: general ASA Status: 2            Anesthesia Type: general    Vitals  Vitals Value Taken Time   /84 04/01/24 1210   Temp 37.1 °C (98.7 °F) 04/01/24 1210   Pulse 79 04/01/24 1211   Resp 15 04/01/24 1210   SpO2 98 % 04/01/24 1211   Vitals shown include unfiled device data.        Post Anesthesia Care and Evaluation    Patient location during evaluation: bedside  Patient participation: complete - patient participated  Level of consciousness: awake  Pain score: 0  Pain management: adequate    Airway patency: patent  Anesthetic complications: No anesthetic complications  PONV Status: controlled  Cardiovascular status: acceptable and stable  Respiratory status: acceptable, spontaneous ventilation and room air  Hydration status: acceptable    Comments: Pt states \"I feel dizzy and slow but good.\" Advised pt to rest and not ambulate without someone around to assist for the rest of today. Pt verbalized understanding.     "

## 2024-04-04 ENCOUNTER — TELEPHONE (OUTPATIENT)
Dept: GASTROENTEROLOGY | Facility: CLINIC | Age: 54
End: 2024-04-04
Payer: COMMERCIAL

## 2024-04-04 NOTE — TELEPHONE ENCOUNTER
I have reviewed the patient's most recent colonoscopy report which shows an adequate prep precluding visualization.  Patient will need repeat colonoscopy in 2 months.  Please troubleshoot any previous bowel prep issues

## 2024-04-08 ENCOUNTER — TELEPHONE (OUTPATIENT)
Dept: GASTROENTEROLOGY | Facility: CLINIC | Age: 54
End: 2024-04-08

## 2024-04-10 ENCOUNTER — PREP FOR SURGERY (OUTPATIENT)
Dept: OTHER | Facility: HOSPITAL | Age: 54
End: 2024-04-10
Payer: COMMERCIAL

## 2024-04-10 DIAGNOSIS — R19.4 CHANGE IN BOWEL HABITS: Primary | ICD-10-CM

## 2024-04-10 DIAGNOSIS — R14.0 BLOATING: ICD-10-CM

## 2024-04-10 DIAGNOSIS — K59.00 CONSTIPATION, UNSPECIFIED CONSTIPATION TYPE: ICD-10-CM

## 2024-04-18 NOTE — PROGRESS NOTES
.Progress Note    Date: 04/19/2024  Time In: 11:00am   Time Out: 11:53am    Patient Legal Name: Zoya rCook  Patient Age: 53 y.o.    CHIEF COMPLAINT: Mood     Subjective   History of Present Illness       Zoya returns today for ongoing treatment.  Goals from our previous session on 03/18/24 were: Supportive therapy to relieve symptom burden  Continue to recognize healthy boundaries, set boundaries and maintain to decress stress and anxiety.     Assessment    Mental Status Exam     Appearance: good hygiene and dressed appropriately for the weather  Behavior: calm  Cooperation:  engaged, cooperative, attentive, and friendly  Eye Contact:  good  Affect:  bright and congruent  Mood: euthymic and expressive  Speech: responsive and talkative  Thought Process:  organized and goal-oriented  Thought Content: appropriate  Suicidal: denies  Homicidal:  denies  Hallucinations:  denies  Memory:  intact  Orientation:  person, place, time, and situation  Reliability:  reliable  Insight:  good  Judgment:  good     Clinical Intervention       ICD-10-CM ICD-9-CM   1. Anxiety and depression  F41.9 300.00    F32.A 311      Zoya is a 53 y.o. female who presents today as a follow-up for continued psychotherapy. Individual psychotherapy was provided utilizing solution focused  techniques to provide symptom relief, support self-esteem, discuss values and strengths, recognize patterns of behavior, acknowledge sources of feelings and behaviors, identify strengths, build confidence, assess symptoms, practice identifying and accepting positive thoughts, and provide support.  Zoya reports improvement in mood. She has noticed decreased irritability and the ability to be engaged in more activities that promote positive emotions.  She is less preoccupied with physical symptoms. She recognizes her improvements and  how far she has came with treatment.  Discussed continuing the progress that she has made.     Plan   Plan & Goals     Continue to  notice progress with treatment  Supportive therapy for symptom relief     Patient acknowledged and verbally consented to continue working toward resolving current treatment plan goals and was educated on the importance of participation in the therapeutic process.  Patient will remain compliant with medication regimen as prescribed. Discuss any medication side effects, questions or concerns with prescribing provider.  Call 911 or present to the nearest emergency room in an emergency situation.  National Suicide Prevention Lifeline: Call 988. The Lifeline provides 24/7, free and confidential support for people in distress, prevention and crisis resources.  Crisis Text Line  Text HOME To 332351    Return in about 3 weeks (around 5/10/2024).    ____________________  This document has been electronically signed by Trudi Almeida LCSW  April 19, 2024 12:00 EDT    Part of this note may be an electronic transcription/translation of spoken language to printed text using the Dragon Dictation System.

## 2024-04-19 ENCOUNTER — OFFICE VISIT (OUTPATIENT)
Dept: PSYCHIATRY | Facility: CLINIC | Age: 54
End: 2024-04-19
Payer: COMMERCIAL

## 2024-04-19 ENCOUNTER — LAB (OUTPATIENT)
Dept: LAB | Facility: HOSPITAL | Age: 54
End: 2024-04-19
Payer: COMMERCIAL

## 2024-04-19 DIAGNOSIS — E03.9 HYPOTHYROIDISM (ACQUIRED): ICD-10-CM

## 2024-04-19 DIAGNOSIS — F32.A ANXIETY AND DEPRESSION: Primary | ICD-10-CM

## 2024-04-19 DIAGNOSIS — F41.9 ANXIETY AND DEPRESSION: Primary | ICD-10-CM

## 2024-04-19 LAB
T3FREE SERPL-MCNC: 2.97 PG/ML (ref 2–4.4)
T4 FREE SERPL-MCNC: 1.44 NG/DL (ref 0.93–1.7)
TSH SERPL DL<=0.05 MIU/L-ACNC: 2.14 UIU/ML (ref 0.27–4.2)

## 2024-04-19 PROCEDURE — 36415 COLL VENOUS BLD VENIPUNCTURE: CPT

## 2024-04-19 PROCEDURE — 84481 FREE ASSAY (FT-3): CPT

## 2024-04-19 PROCEDURE — 84443 ASSAY THYROID STIM HORMONE: CPT

## 2024-04-19 PROCEDURE — 84439 ASSAY OF FREE THYROXINE: CPT

## 2024-04-19 NOTE — PSYCHOTHERAPY NOTE
Better     Scared to be better   Increase in med has helped, irritable,   walk away  Have   Gardening,    -raised   Chickens

## 2024-04-30 ENCOUNTER — TELEMEDICINE (OUTPATIENT)
Dept: PSYCHIATRY | Facility: CLINIC | Age: 54
End: 2024-04-30
Payer: COMMERCIAL

## 2024-04-30 DIAGNOSIS — F33.1 MAJOR DEPRESSIVE DISORDER, RECURRENT EPISODE, MODERATE: Primary | ICD-10-CM

## 2024-04-30 DIAGNOSIS — F41.1 GENERALIZED ANXIETY DISORDER: ICD-10-CM

## 2024-04-30 PROCEDURE — 1160F RVW MEDS BY RX/DR IN RCRD: CPT | Performed by: NURSE PRACTITIONER

## 2024-04-30 PROCEDURE — 1159F MED LIST DOCD IN RCRD: CPT | Performed by: NURSE PRACTITIONER

## 2024-04-30 PROCEDURE — 99214 OFFICE O/P EST MOD 30 MIN: CPT | Performed by: NURSE PRACTITIONER

## 2024-04-30 RX ORDER — MIRTAZAPINE 15 MG/1
15 TABLET, FILM COATED ORAL NIGHTLY
Qty: 30 TABLET | Refills: 3 | Status: SHIPPED | OUTPATIENT
Start: 2024-04-30

## 2024-04-30 RX ORDER — DULOXETIN HYDROCHLORIDE 30 MG/1
30 CAPSULE, DELAYED RELEASE ORAL DAILY
Qty: 30 CAPSULE | Refills: 3 | Status: SHIPPED | OUTPATIENT
Start: 2024-04-30

## 2024-04-30 NOTE — PROGRESS NOTES
This provider is located at the [facility] (through [facility]), [address], [facility], [address] using a secure [employer] Video Visit through [employer]. Patient is being seen remotely via telehealth at their home address in [place], and stated they are in a secure environment for this session. The patient's condition being diagnosed/treated is appropriate for telemedicine. The provider identified himself as well as his credentials.   The patient consent to be seen remotely, and when consent is given they understand that the consent allows for patient identifiable information to be sent to a third party as needed.   They may refuse to be seen remotely at any time. The electronic data is encrypted and password protected, and the patient  has been advised of the potential risks to privacy not withstanding such measures.    You have chosen to receive care through a telehealth visit.  Do you consent to use a video/audio connection for your medical care today? Yes    Patient identifiers utilized: Name and date of birth.    Patient verbally confirmed consent for today's encounter- yes    Subjective   Zoya Crook is a 53 y.o. female who presents today for follow-up appointment.     Chief Complaint:  Depression, anxiety    History of Present Illness:     Patient reports her mood has improved over the past month. Feeling less down. More interest- enjoying spending more time outside with warmer weather. Working on her garden, which she enjoys. Sleeping well. Energy levels low at times. Anxiety has improved over the past month. Less worries. Able to relax better.     Prior Psychiatric Medications:  Sertraline, prozac, paxil, duloxetine, mirtazapine     The following portions of the patient's history were reviewed and updated as appropriate: allergies, current medications, past family history, past medical history, past social history, past surgical history and problem  list.    Allergy:   Allergies   Allergen Reactions    Acetaminophen Unknown - High Severity    Naproxen Other (See Comments)     GI UPSET/ HX ULCER    Sulfamethoxazole Other (See Comments)    Ciprofloxacin Rash        Current Medications:   Current Outpatient Medications   Medication Sig Dispense Refill    amLODIPine (NORVASC) 10 MG tablet Take 1 tablet every day by oral route for 90 days.      aspirin 81 MG chewable tablet aspirin 81 mg chewable tablet      atorvastatin (LIPITOR) 80 MG tablet Take 1 tablet by mouth Every Night. 30 tablet 0    clopidogrel (PLAVIX) 75 MG tablet Take 1 tablet by mouth Daily.      DULoxetine (Cymbalta) 30 MG capsule Take 1 capsule by mouth Daily. 30 capsule 3    fluticasone (FLONASE) 50 MCG/ACT nasal spray       linaclotide (Linzess) 145 MCG capsule capsule Take 1 capsule by mouth Every Morning Before Breakfast for 90 days. 90 capsule 1    mirtazapine (Remeron) 15 MG tablet Take 1 tablet by mouth Every Night. 30 tablet 3    polyethylene glycol (GoLYTELY) 236 g solution Mix solution.  Drink 2/3 of solution at 6:00 PM on the evening prior to procedure.  Drink remaining 1/3 of solution four hours prior to the scheduled arrival-time on the morning of the procedure. 4000 mL 0    propranolol LA (INDERAL LA) 80 MG 24 hr capsule        No current facility-administered medications for this visit.       Mental Status Exam:   Hygiene:   good  Cooperation:  Cooperative  Eye Contact:  Good  Psychomotor Behavior:  Appropriate  Affect:  Full range  Mood: normal  Hopelessness: Denies  Speech:  Normal  Thought Process:  Goal directed  Thought Content:  Normal  Suicidal:  None  Homicidal:  None  Hallucinations:  None  Delusion:  None  Memory:  Intact  Orientation:  Grossly intact  Reliability:  good  Insight:  Good  Judgement:  Good  Impulse Control:  Good  Physical/Medical Issues:  No      Physical Exam:   There were no vitals taken for this visit. There is no height or weight on file to calculate BMI.    Due to the remote nature of this encounter (virtual encounter), vitals were unable to be obtained.  Weight change: n    PHQ-9 Depression Screening  Little interest or pleasure in doing things?     Feeling down, depressed, or hopeless?     Trouble falling or staying asleep, or sleeping too much?     Feeling tired or having little energy?     Poor appetite or overeating?     Feeling bad about yourself - or that you are a failure or have let yourself or your family down?     Trouble concentrating on things, such as reading the newspaper or watching television?     Moving or speaking so slowly that other people could have noticed? Or the opposite - being so fidgety or restless that you have been moving around a lot more than usual?     Thoughts that you would be better off dead, or of hurting yourself in some way?     PHQ-9 Total Score     If you checked off any problems, how difficult have these problems made it for you to do your work, take care of things at home, or get along with other people?       PHQ-9 Total Score:           Previous available Provider notes and records reviewed by this APRN at today's encounter.     Visit Diagnoses:    ICD-10-CM ICD-9-CM   1. Major depressive disorder, recurrent episode, moderate  F33.1 296.32   2. Generalized anxiety disorder  F41.1 300.02       TREATMENT PLAN: Continue supportive psychotherapy efforts and medications.  Medication and treatment options, both pharmacological and non-pharmacological treatment options, discussed during today's visit, including any off label use of medication. Patient acknowledged and verbally consented with current treatment plan and was educated on the importance of compliance with treatment and follow-up appointments.      - Stopped taking the duloxetine 40mg due to having headaches after taking for couple of days. Has been taking leftover duloxetine 30mg tablets she had. Decrease duloxetine 40mg to 30mg po qday to target depression and  anxiety  - Continue mirtazapine 7.5mg po qhs to target depression and anxiety    Labs: None ordered at this time  Therapy: Defers    MEDICATION ISSUES:  Discussed treatment plan and medication options of prescribed medication as well as the risks, benefits, any black box warnings, and side effects including potential falls, possible impaired driving, and metabolic adversities among others, including any off label use of medication. Patient is agreeable to call the office with any worsening of symptoms or onset of side effects, or if any concerns or questions arise.  The contact information for the office is made available to the patient. Patient is agreeable to call 911 or go to the nearest ER should they begin having any SI/HI, or if any urgent concerns arise. No medication side effects or related complaints today. OSIEL reviewed as expected.    RISK ASSESSMENT:  Risk of self-harm acutely is moderate.  Risk factors include anxiety disorder, mood disorder, and recent psychosocial stressors (pandemic). Protective factors include no family history, denies access to guns/weapons, no present SI, no history of suicide attempts or self-harm in the past, minimal AODA, healthcare seeking, future orientation, willingness to engage in care.  Risk of self-harm chronically is also moderate, but could be further elevated in the event of treatment noncompliance and/or AODA.    VERBAL INFORMED CONSENT FOR MEDICATION:  The patient was educated that their proposed/prescribed psychotropic medication(s) has potential risks, side effects, adverse effects, and black box warnings; and these have been discussed with the patient.  The patient has been informed that their treatment and medication dosage is to be individualized, and may even be above or below the recommended range/dosage due to patient individualization and response, but medication is prescribed using a shared decision making approach, and no medication or dosage will be  prescribed without the patient's verbal consent.  The reason for the use of the medication including any off label use and alternative modes of treatment other than or in addition to medication has been considered and discussed, the probable consequences of not receiving the proposed treatment have been discussed, and any treatment side effects, black box warnings, and cautions associated with treatment have been discussed with the patient.  The patient is allowed ample time to openly discuss and ask questions regarding the proposed medication(s) and treatment plan and the patient verbalizes understanding the reasons for the use of the medication, its potential risks and benefits, other alternative treatment(s), and the probable consequences that may occur if the proposed medication is not given.  The patient has been given ample time to ask questions and study the information and find the information to be specific, accurate, and complete.  The patient gives verbal consent for the medication(s) proposed/prescribed, they verbalized understanding that they can refuse and withdraw consent at any time with the assistance of this APRN, and the patient has verbally confirmed that they are aware, and are willing, to take the prescribed medication and follow the treatment plan with the known possible risks, side effect, black box warnings, and any potential medication interactions, and the patient reports they will be worse off without this medication and treatment plan.  The patient is advised to contact this APRN/this office if any questions or concerns arise at any time (at [phone]), or call 911/go to the closest emergency department if needed or outside of office hours.      [facility] No Show Policy:  We understand unexpected circumstances arise; however, anytime you miss your appointment we are unable to provide you appropriate care.  In addition, each appointment missed could have been  used to provide care for others.  We ask that you call at least 24 hours in advance to cancel or reschedule an appointment.  We would like to take this opportunity to remind you of our policy stating patients who miss THREE or more appointments without cancelling or rescheduling 24 hours in advance of the appointment may be subject to cancellation of any further visits with our clinic and recommendation to seek in-person services/visits.    Please call [phone] to reschedule your appointment. If there are reasons that make it difficult for you to keep the appointments, please call and let us know how we can help.  Please understand that medication prescribing will not continue without seeing your provider.      [facility]'s No Show Policy reviewed with patient at today's visit. Patient verbalized understanding of this policy. Discussed with patient that in the event that there are three or more no show visits, it will be recommended that they pursue in-person services/visits as noncompliance with telehealth visits indicates that patient is not an appropriate candidate for telemedicine and would likely be more appropriate for in-person services/visits. Patient verbalizes understanding and is agreeable to this.    MEDS ORDERED DURING VISIT:  New Medications Ordered This Visit   Medications    DULoxetine (Cymbalta) 30 MG capsule     Sig: Take 1 capsule by mouth Daily.     Dispense:  30 capsule     Refill:  3     Dose decrease 4/30. Please d/c duloxetine 40mg po qday rx.    mirtazapine (Remeron) 15 MG tablet     Sig: Take 1 tablet by mouth Every Night.     Dispense:  30 tablet     Refill:  3       Patient informed this provider is leaving the practice. Patient would like to continue care with [facility]. Patient informed to call our office to schedule appointment with new provider. Recommended follow up in 4 weeks or sooner if needed.          Progress toward goal: At  goal    Functional Status: No impairment    Prognosis: Good with Ongoing Treatment     This document has been electronically signed by ZAYNAB Sinclair  April 30, 2024 09:33 EDT      Please note that portions of this note were completed with a voice recognition program.

## 2024-05-08 ENCOUNTER — HOSPITAL ENCOUNTER (EMERGENCY)
Facility: HOSPITAL | Age: 54
Discharge: HOME | End: 2024-05-08
Attending: EMERGENCY MEDICINE
Payer: COMMERCIAL

## 2024-05-08 VITALS
DIASTOLIC BLOOD PRESSURE: 79 MMHG | RESPIRATION RATE: 18 BRPM | HEIGHT: 65 IN | BODY MASS INDEX: 25.38 KG/M2 | TEMPERATURE: 97.9 F | OXYGEN SATURATION: 98 % | WEIGHT: 152.34 LBS | HEART RATE: 67 BPM | SYSTOLIC BLOOD PRESSURE: 155 MMHG

## 2024-05-08 DIAGNOSIS — R04.0 EPISTAXIS: Primary | ICD-10-CM

## 2024-05-08 LAB
ALBUMIN SERPL-MCNC: 4.2 G/DL (ref 3.5–5.2)
ALBUMIN/GLOB SERPL: 1.1 G/DL
ALP SERPL-CCNC: 87 U/L (ref 39–117)
ALT SERPL W P-5'-P-CCNC: 15 U/L (ref 1–33)
ANION GAP SERPL CALCULATED.3IONS-SCNC: 12.5 MMOL/L (ref 5–15)
AST SERPL-CCNC: 17 U/L (ref 1–32)
BASOPHILS # BLD AUTO: 0.09 10*3/MM3 (ref 0–0.2)
BASOPHILS NFR BLD AUTO: 0.8 % (ref 0–1.5)
BILIRUB SERPL-MCNC: 0.2 MG/DL (ref 0–1.2)
BUN SERPL-MCNC: 11 MG/DL (ref 6–20)
BUN/CREAT SERPL: 14.7 (ref 7–25)
CALCIUM SPEC-SCNC: 9.5 MG/DL (ref 8.6–10.5)
CHLORIDE SERPL-SCNC: 103 MMOL/L (ref 98–107)
CO2 SERPL-SCNC: 22.5 MMOL/L (ref 22–29)
CREAT SERPL-MCNC: 0.75 MG/DL (ref 0.57–1)
DEPRECATED RDW RBC AUTO: 50.1 FL (ref 37–54)
EGFRCR SERPLBLD CKD-EPI 2021: 94.2 ML/MIN/1.73
EOSINOPHIL # BLD AUTO: 0.27 10*3/MM3 (ref 0–0.4)
EOSINOPHIL NFR BLD AUTO: 2.3 % (ref 0.3–6.2)
ERYTHROCYTE [DISTWIDTH] IN BLOOD BY AUTOMATED COUNT: 16.8 % (ref 12.3–15.4)
GLOBULIN UR ELPH-MCNC: 3.8 GM/DL
GLUCOSE SERPL-MCNC: 83 MG/DL (ref 65–99)
HCT VFR BLD AUTO: 39.5 % (ref 34–46.6)
HGB BLD-MCNC: 12.6 G/DL (ref 12–15.9)
IMM GRANULOCYTES # BLD AUTO: 0.03 10*3/MM3 (ref 0–0.05)
IMM GRANULOCYTES NFR BLD AUTO: 0.3 % (ref 0–0.5)
INR PPP: 1.03 (ref 0.86–1.15)
LYMPHOCYTES # BLD AUTO: 3 10*3/MM3 (ref 0.7–3.1)
LYMPHOCYTES NFR BLD AUTO: 26.1 % (ref 19.6–45.3)
MCH RBC QN AUTO: 26.1 PG (ref 26.6–33)
MCHC RBC AUTO-ENTMCNC: 31.9 G/DL (ref 31.5–35.7)
MCV RBC AUTO: 82 FL (ref 79–97)
MONOCYTES # BLD AUTO: 0.72 10*3/MM3 (ref 0.1–0.9)
MONOCYTES NFR BLD AUTO: 6.3 % (ref 5–12)
NEUTROPHILS NFR BLD AUTO: 64.2 % (ref 42.7–76)
NEUTROPHILS NFR BLD AUTO: 7.39 10*3/MM3 (ref 1.7–7)
NRBC BLD AUTO-RTO: 0 /100 WBC (ref 0–0.2)
PLATELET # BLD AUTO: 230 10*3/MM3 (ref 140–450)
PMV BLD AUTO: 12 FL (ref 6–12)
POTASSIUM SERPL-SCNC: 3.9 MMOL/L (ref 3.5–5.2)
PROT SERPL-MCNC: 8 G/DL (ref 6–8.5)
PROTHROMBIN TIME: 13.7 SECONDS (ref 11.8–14.9)
RBC # BLD AUTO: 4.82 10*6/MM3 (ref 3.77–5.28)
SODIUM SERPL-SCNC: 138 MMOL/L (ref 136–145)
WBC NRBC COR # BLD AUTO: 11.5 10*3/MM3 (ref 3.4–10.8)

## 2024-05-08 PROCEDURE — 36415 COLL VENOUS BLD VENIPUNCTURE: CPT

## 2024-05-08 PROCEDURE — 85610 PROTHROMBIN TIME: CPT

## 2024-05-08 PROCEDURE — 85025 COMPLETE CBC W/AUTO DIFF WBC: CPT

## 2024-05-08 PROCEDURE — 99283 EMERGENCY DEPT VISIT LOW MDM: CPT

## 2024-05-08 PROCEDURE — 80053 COMPREHEN METABOLIC PANEL: CPT

## 2024-05-08 RX ORDER — HYDROCODONE BITARTRATE AND ACETAMINOPHEN 5; 325 MG/1; MG/1
1 TABLET ORAL EVERY 6 HOURS PRN
COMMUNITY

## 2024-05-08 RX ORDER — TRANEXAMIC ACID 100 MG/ML
500 INJECTION, SOLUTION INTRAVENOUS ONCE
Status: COMPLETED | OUTPATIENT
Start: 2024-05-08 | End: 2024-05-08

## 2024-05-08 RX ORDER — LOSARTAN POTASSIUM 25 MG/1
25 TABLET ORAL DAILY
Qty: 15 TABLET | Refills: 0 | Status: SHIPPED | OUTPATIENT
Start: 2024-05-08

## 2024-05-08 RX ADMIN — TRANEXAMIC ACID 500 MG: 100 INJECTION INTRAVENOUS at 13:31
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
## 2024-05-09 ENCOUNTER — OFFICE VISIT (OUTPATIENT)
Dept: PSYCHIATRY | Facility: CLINIC | Age: 54
End: 2024-05-09
Payer: COMMERCIAL

## 2024-05-09 DIAGNOSIS — F41.9 ANXIETY AND DEPRESSION: Primary | ICD-10-CM

## 2024-05-09 DIAGNOSIS — F32.A ANXIETY AND DEPRESSION: Primary | ICD-10-CM

## 2024-05-09 DIAGNOSIS — F33.1 MAJOR DEPRESSIVE DISORDER, RECURRENT EPISODE, MODERATE: Primary | ICD-10-CM

## 2024-05-10 ENCOUNTER — TELEPHONE (OUTPATIENT)
Dept: OTOLARYNGOLOGY | Facility: CLINIC | Age: 54
End: 2024-05-10
Payer: COMMERCIAL

## 2024-05-10 NOTE — TELEPHONE ENCOUNTER
Provider: DR PEREZ    Caller: FABIANO KRISHNA    Relationship to Patient: SELF      Reason for Call: PT WAS SEEN @  [facility] 5/09/24 FOR NOSE BLEEDS.  PT HAS BEEN HAVING NOSE BLEEDS FOR 4 DAYS, 2 TIMES PER DAY.  PT ALSO GETS HEADACHES.    NOSE BLEEDS SOMETIMES OCCUR AT NIGHT, WHICH CAUSES CHOKING.    PT HAS REQUESTED TO BE SEEN SOONER THAN NEXT APPT SCHEDULED 6/6/24    When was the patient last seen: 12/6/23    When did it start: 5/7/24    Where is it located: NOSE    Characteristics of symptom/severity: 10    Timing- Is it constant or intermittent: INTERMITTENT    What makes it worse: NA    What makes it better: NA    What therapies/medications have you tried: NA

## 2024-05-13 NOTE — TELEPHONE ENCOUNTER
Rescheduled patients 6/6 follow-up appointment due to patient requesting sooner appointment. Nose bleeds for 4x day, 2x per day.     Rescheduled patient for 5/15.     Patient verbalized understanding.

## 2024-05-15 ENCOUNTER — OFFICE VISIT (OUTPATIENT)
Dept: OTOLARYNGOLOGY | Facility: CLINIC | Age: 54
End: 2024-05-15
Payer: COMMERCIAL

## 2024-05-15 VITALS — HEIGHT: 65 IN | BODY MASS INDEX: 25.49 KG/M2 | TEMPERATURE: 97.5 F | WEIGHT: 153 LBS

## 2024-05-15 DIAGNOSIS — F17.210 CONTINUOUS DEPENDENCE ON CIGARETTE SMOKING: ICD-10-CM

## 2024-05-15 DIAGNOSIS — E06.0 ACUTE THYROIDITIS: ICD-10-CM

## 2024-05-15 DIAGNOSIS — J34.2 DEVIATED NASAL SEPTUM: ICD-10-CM

## 2024-05-15 DIAGNOSIS — R05.3 CHRONIC COUGH: ICD-10-CM

## 2024-05-15 DIAGNOSIS — M95.0 NASAL VALVE COLLAPSE: ICD-10-CM

## 2024-05-15 DIAGNOSIS — E05.00 GRAVES DISEASE: ICD-10-CM

## 2024-05-15 DIAGNOSIS — E06.3 HASHIMOTO'S THYROIDITIS: ICD-10-CM

## 2024-05-15 DIAGNOSIS — E03.9 HYPOTHYROIDISM (ACQUIRED): ICD-10-CM

## 2024-05-15 DIAGNOSIS — R04.0 EPISTAXIS: Primary | ICD-10-CM

## 2024-05-15 DIAGNOSIS — E04.1 THYROID NODULE: ICD-10-CM

## 2024-05-15 DIAGNOSIS — Z72.0 TOBACCO ABUSE: ICD-10-CM

## 2024-05-15 RX ORDER — LORATADINE 10 MG/1
1 TABLET ORAL DAILY
COMMUNITY
Start: 2024-05-07

## 2024-05-15 RX ORDER — LINACLOTIDE 145 UG/1
145 CAPSULE, GELATIN COATED ORAL
COMMUNITY

## 2024-05-15 RX ORDER — POLYETHYLENE GLYCOL 3350, SODIUM CHLORIDE, SODIUM BICARBONATE, POTASSIUM CHLORIDE 420; 11.2; 5.72; 1.48 G/4L; G/4L; G/4L; G/4L
POWDER, FOR SOLUTION ORAL
COMMUNITY

## 2024-05-15 RX ORDER — PANTOPRAZOLE SODIUM 40 MG/1
TABLET, DELAYED RELEASE ORAL
COMMUNITY
Start: 2024-02-06

## 2024-05-15 RX ORDER — PENICILLIN V POTASSIUM 500 MG/1
1 TABLET ORAL EVERY 6 HOURS
COMMUNITY

## 2024-05-15 RX ORDER — LEVOTHYROXINE SODIUM 0.07 MG/1
TABLET ORAL
COMMUNITY

## 2024-05-15 RX ORDER — PROPRANOLOL HYDROCHLORIDE 80 MG/1
CAPSULE, EXTENDED RELEASE ORAL
COMMUNITY
Start: 2024-05-09

## 2024-05-15 RX ORDER — ALBUTEROL SULFATE 90 UG/1
AEROSOL, METERED RESPIRATORY (INHALATION)
COMMUNITY
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
## 2024-05-15 NOTE — PATIENT INSTRUCTIONS
1.  Ultrasound obtained on March 11, 2024 shows basically stable thyroid nodule and also a heterogeneous thyroid consistent with thyroiditis.  2.  Thyroid function tests were unremarkable with patient taking levothyroxine and 75 mcg p.o. daily.  Obviously TPO is above 600 and TSI 35.6 again consistent with Graves' disease and Hashimoto's thyroiditis.  3.  Regarding epistaxis, patient has no active bleeding vessels on today's and rigid nasal endoscopy.  She does have a caudal septal deviation to the right.  And she also has some allergies.  I think most the time if the bleeding occurred it was more recent and it was due to probably nasal manipulation once the vessel bleed obviously being on aspirin and Plavix did not help.  Currently there is no actively bleeding vessels or clots present.  Therefore she may continue the Plavix and aspirin.  However regarding her chest pain I recommend the patient see her cardiologist right away and have it addressed.  If there is no cardiac etiology of her chest pain then I think probably most likely it is from the reflux.  I recommended patient to quit smoking and also probably later to get her off of caffeine as well.  4.  I am going to see patient next year with ultrasound and thyroid lab.  5.  If patient continues to have recurrent nosebleeds I think we probably will have to deal with either cauterization or possibly even proceed with septoplasty.  Certainly having deviation of nasal septum does not help with this either.  6.  Patient intends to quit smoking and also will address her chest pain with cardiology especially given her history of MI in the past.

## 2024-06-06 NOTE — PROGRESS NOTES
[facility] - Progress Note    Date: 06/07/2024  Time In: 11:00am   Time Out: 11:48am    Patient Legal Name: Zoya Crook  Patient Age: 53 y.o.    CHIEF COMPLAINT: depression and anxiety     Subjective   History of Present Illness       Zoya returns today for ongoing treatment  Goals form our most recent session on 05/09/24 were:   Continue to maintain healthy boundaries   Supportive therapy for relief of symptom burden     Assessment    Mental Status Exam     Appearance: good hygiene and dressed appropriately for the weather  Behavior: calm  Cooperation:  engaged, cooperative, attentive, and friendly  Eye Contact:  good  Affect:  congruent and angry  Mood: expressive, anxious, and cautious  Speech: responsive and talkative  Thought Process:  organized  Thought Content: appropriate  Suicidal: denies  Homicidal:  denies  Hallucinations:  denies  Memory:  intact  Orientation:  person, place, time, and situation  Reliability:  reliable  Insight:  good  Judgment:  good     Clinical Intervention       ICD-10-CM ICD-9-CM   1. Anxiety and depression  F41.9 300.00    F32.A 311        Zoya is a 53 y.o. female who presents today as a follow-up for continued psychotherapy. Individual psychotherapy was provided utilizing solution focused  techniques to provide symptom relief, encourage expression of thoughts and feelings, support self-esteem, manage stress, identify triggers, recognize patterns of behavior, acknowledge sources of feelings and behaviors, identify strengths, assess symptoms, provide support, and discuss interpersonal conflicts.  Zoya reports that she has had some increased stress related to issues that her daughter is experiencing.  Child protective services removed her children this week, she is concerned about potential drug use.  She has experienced emotional turmoil, is trying to accept that this is out of her control.  She realizes that she has to be protective of her self.  She has had some issues  with her medication, has discontinued using Cymbalta as she had difficulty functioning and felt like she could not tolerate it.  Her pm medication works for sleep and she continues to take that medication daily.  Her provider has changed and we will try to work with her and obtain an earlier appointment than current one scheduled the end of August.     Plan   Plan & Goals     Supportive therapy for relief of symptom burden  Work toward earlier appointment with provider for medication management.     Patient acknowledged and verbally consented to continue working toward resolving current treatment plan goals and was educated on the importance of participation in the therapeutic process.  Patient will remain compliant with medication regimen as prescribed. Discuss any medication side effects, questions or concerns with prescribing provider.  Call 911 or present to the nearest emergency room in an emergency situation.  National Suicide Prevention Lifeline: Call 988. The Lifeline provides 24/7, free and confidential support for people in distress, prevention and crisis resources.  Crisis Text Line  Text HOME To 731707    Return in about 3 weeks (around 6/28/2024).    ____________________  This document has been electronically signed by Trudi Almeida LCSW  June 7, 2024 11:58 EDT    Part of this note may be an electronic transcription/translation of spoken language to printed text using the Dragon Dictation System.

## 2024-06-07 ENCOUNTER — OFFICE VISIT (OUTPATIENT)
Dept: PSYCHIATRY | Facility: CLINIC | Age: 54
End: 2024-06-07
Payer: COMMERCIAL

## 2024-06-07 DIAGNOSIS — F32.A ANXIETY AND DEPRESSION: Primary | ICD-10-CM

## 2024-06-07 DIAGNOSIS — F41.9 ANXIETY AND DEPRESSION: Primary | ICD-10-CM

## 2024-06-07 NOTE — PSYCHOTHERAPY NOTE
Vacation next month   Other grandchildren coming   Drug test,  think she is in trouble   CPS took them  Monday     Gave her till Monday to clean it up and prove not on drugs   Cymbalta not working, not taking it   [employer]eron works,   Stopped cymbalta, could not function   feeling depressive symptoms return

## 2024-06-12 ENCOUNTER — OFFICE VISIT (OUTPATIENT)
Dept: PSYCHIATRY | Facility: CLINIC | Age: 54
End: 2024-06-12
Payer: COMMERCIAL

## 2024-06-12 VITALS
BODY MASS INDEX: 25.76 KG/M2 | SYSTOLIC BLOOD PRESSURE: 142 MMHG | WEIGHT: 154.6 LBS | HEART RATE: 95 BPM | HEIGHT: 65 IN | DIASTOLIC BLOOD PRESSURE: 72 MMHG

## 2024-06-12 DIAGNOSIS — F33.1 MAJOR DEPRESSIVE DISORDER, RECURRENT EPISODE, MODERATE: Primary | ICD-10-CM

## 2024-06-12 DIAGNOSIS — F51.05 INSOMNIA DUE TO MENTAL CONDITION: ICD-10-CM

## 2024-06-12 DIAGNOSIS — F41.1 GENERALIZED ANXIETY DISORDER: ICD-10-CM

## 2024-06-12 RX ORDER — BUPROPION HYDROCHLORIDE 150 MG/1
150 TABLET ORAL EVERY MORNING
Qty: 30 TABLET | Refills: 2 | Status: SHIPPED | OUTPATIENT
Start: 2024-06-12
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
## 2024-06-12 NOTE — PATIENT INSTRUCTIONS
1.  Please return to clinic at your next scheduled visit.  Contact the clinic ([phone]) at least 24 hours prior in the event you need to cancel.  2.  Do no harm to yourself or others.    3.  Avoid alcohol and drugs.    4.  Take all medications as prescribed.  Please contact the clinic with any concerns. If you are in need of medication refills, please call the clinic at [phone].    5. Should you want to get in touch with your provider, Dr. Alberta Jones, please utilize [employer] or contact the office ([phone]), and staff will be able to page Dr. Jones directly.  6.  In the event you have personal crisis, contact the following crisis numbers: Suicide Prevention Hotline 1-438.949.7332; [facility] Helpline 0-207-877-JEREMIE; [facility] Emergency Room [phone]; text HELLO to 156792; or 121.

## 2024-06-21 NOTE — PROGRESS NOTES
[facility] - Progress Note    Date: 06/24/2024  Time In: 11:00am  Time Out: 11:39am     Patient Legal Name: Zoya Crook  Patient Age: 53 y.o.    CHIEF COMPLAINT: depression     Subjective   History of Present Illness       Zoya returns todeay for ongong treatment  Goals from our most recent session on 06/07/24 are: Supportive therapy for relief of symptom burden  Work toward earlier appointment with provider for medication management.        Assessment    Mental Status Exam     Appearance: good hygiene and dressed appropriately for the weather  Behavior: calm  Cooperation:  engaged, cooperative, attentive, and friendly  Eye Contact:  good  Affect:  congruent  Mood: expressive and sad  Speech: responsive and talkative  Thought Process:  organized and goal-oriented  Thought Content: appropriate  Suicidal: denies  Homicidal:  denies  Hallucinations:  denies  Memory:  intact  Orientation:  person, place, time, and situation  Reliability:  reliable  Insight:  good  Judgment:  good     Clinical Intervention       ICD-10-CM ICD-9-CM   1. Anxiety and depression  F41.9 300.00    F32.A 311   Zoya is a 53 y.o. female who presents today as a follow-up for continued psychotherapy. Individual psychotherapy was provided utilizing solution focused  techniques to provide symptom relief, encourage expression of thoughts and feelings, manage stress, identify triggers, recognize patterns of behavior, assess symptoms, and provide support. Raquel continues to verbalize concerns regarding family situations.  Her youngest granchildren are in foster care.  She recognizes her limits and she is unable to help with this situation any more than she already has.  She is feeling some improvement with recent medication change.  She feels better in the mornings and thinks that it may be helping with her energy.  She is optimistic that she may continue to experience some more benefit.  Sleeping remains good.       Plan   Plan & Goals      Supportive therapy for relief of symptom burden  Continue to monitor mood and remain compliant with all aspects of treatment.     Patient acknowledged and verbally consented to continue working toward resolving current treatment plan goals and was educated on the importance of participation in the therapeutic process.  Patient will remain compliant with medication regimen as prescribed. Discuss any medication side effects, questions or concerns with prescribing provider.  Call 911 or present to the nearest emergency room in an emergency situation.  National Suicide Prevention Lifeline: Call 988. The Lifeline provides 24/7, free and confidential support for people in distress, prevention and crisis resources.  Crisis Text Line  Text HOME To 839893    Return in about 3 weeks (around 7/15/2024).    ____________________  This document has been electronically signed by Trudi Almeida LCSW  June 24, 2024 11:44 EDT    Part of this note may be an electronic transcription/translation of spoken language to printed text using the Dragon Dictation System.

## 2024-06-24 ENCOUNTER — OFFICE VISIT (OUTPATIENT)
Dept: PSYCHIATRY | Facility: CLINIC | Age: 54
End: 2024-06-24
Payer: COMMERCIAL

## 2024-06-24 DIAGNOSIS — F32.A ANXIETY AND DEPRESSION: Primary | ICD-10-CM

## 2024-06-24 DIAGNOSIS — F41.9 ANXIETY AND DEPRESSION: Primary | ICD-10-CM

## 2024-06-24 PROCEDURE — 1159F MED LIST DOCD IN RCRD: CPT | Performed by: SOCIAL WORKER

## 2024-06-24 PROCEDURE — 1160F RVW MEDS BY RX/DR IN RCRD: CPT | Performed by: SOCIAL WORKER

## 2024-06-24 PROCEDURE — 90834 PSYTX W PT 45 MINUTES: CPT | Performed by: SOCIAL WORKER

## 2024-06-24 NOTE — PSYCHOTHERAPY NOTE
A little more energy,    looks like helping     Try to see where we are on the 1st    Ankles,    Gout?    Alejandra - [employer]  games -does not want to have a phone that calls

## 2024-06-25 NOTE — PRE-PROCEDURE INSTRUCTIONS
"Instructed on date and arrival time of 0730. Come to entrance \"C\". Must have  over age 18 to drive home.  May have two visitors; however, children under 12 must stay in waiting room.  Discussed clear liquid diet (no red or purple), bowel prep, and NPO.  May take medications as usual except for blood thinners, diabetic medications, and weight loss medications.  Verbalized understanding of instructions given.  Instructed to call for questions or concerns.  Hold Plavix for 5 days prior to procedure.  Cardiac clearance noted in chart.  Awaiting blood thinner clearance.  "

## 2024-06-27 ENCOUNTER — TELEPHONE (OUTPATIENT)
Dept: GASTROENTEROLOGY | Facility: CLINIC | Age: 54
End: 2024-06-27
Payer: COMMERCIAL

## 2024-06-27 NOTE — TELEPHONE ENCOUNTER
Blood Thinner Clearance REC'D. Patient had already rec'd instructions from her provider to hold Plavix.

## 2024-07-01 ENCOUNTER — ANESTHESIA EVENT (OUTPATIENT)
Dept: GASTROENTEROLOGY | Facility: HOSPITAL | Age: 54
End: 2024-07-01
Payer: COMMERCIAL

## 2024-07-01 NOTE — ANESTHESIA PREPROCEDURE EVALUATION
Anesthesia Evaluation     Patient summary reviewed   NPO Solid Status: > 8 hours  NPO Liquid Status: > 2 hours           Airway   Mallampati: II  TM distance: >3 FB  Neck ROM: full  No difficulty expected  Dental    (+) upper dentures    Pulmonary - normal exam    breath sounds clear to auscultation  (+) a smoker Current, Smoked day of surgery, cigarettes, COPD mild,recent URI  Cardiovascular - normal exam    Patient on routine beta blocker  Rhythm: regular  Rate: normal    (+) hypertension, past MI  >12 months, CAD, hyperlipidemia    ROS comment: 2/28/24 EKG  HEART RATE= 91  bpm  RR Interval= 656  ms  SD Interval= 138  ms  P Horizontal Axis= -21  deg  P Front Axis= 22  deg  QRSD Interval= 85  ms  QT Interval= 358  ms  QTcB= 442  ms  QRS Axis= 54  deg  T Wave Axis= 10  deg  - BORDERLINE ECG -  Sinus rhythm  Probable left atrial enlargement  When compared with ECG of 29-Sep-2023 22:51:52,  No significant change        Neuro/Psych  (+) TIA, CVA, headaches, numbness, psychiatric history Anxiety and Depression  GI/Hepatic/Renal/Endo    (+) GERD well controlled, renal disease- stones, thyroid problem     Musculoskeletal     (+) neck pain  Abdominal  - normal exam   Substance History      OB/GYN          Other   arthritis,     ROS/Med Hx Other: 11/26/22 Echo  Interpretation Summary  ·  Calculated left ventricular EF = 58.2%  ·  Left ventricular wall thickness is consistent with mild septal asymmetric hypertrophy.  ·  Left ventricular diastolic function is consistent with (grade I) impaired relaxation.  ·  No hemodynamically significant valvular pathology.    Clopidogrel last dose -                 Anesthesia Plan    ASA 3     general   total IV anesthesia  (Total IV Anesthesia    Patient understands anesthesia not responsible for dental damage.  )  intravenous induction     Anesthetic plan, risks, benefits, and alternatives have been provided, discussed and informed consent has been obtained with: patient.  Pre-procedure  education provided  Plan discussed with CRNA.      CODE STATUS:

## 2024-07-02 ENCOUNTER — HOSPITAL ENCOUNTER (OUTPATIENT)
Facility: HOSPITAL | Age: 54
Setting detail: HOSPITAL OUTPATIENT SURGERY
Discharge: HOME | End: 2024-07-02
Attending: INTERNAL MEDICINE | Admitting: INTERNAL MEDICINE
Payer: COMMERCIAL

## 2024-07-02 ENCOUNTER — ANESTHESIA (OUTPATIENT)
Dept: GASTROENTEROLOGY | Facility: HOSPITAL | Age: 54
End: 2024-07-02
Payer: COMMERCIAL

## 2024-07-02 VITALS
HEIGHT: 65 IN | OXYGEN SATURATION: 97 % | DIASTOLIC BLOOD PRESSURE: 84 MMHG | HEART RATE: 75 BPM | BODY MASS INDEX: 25.73 KG/M2 | SYSTOLIC BLOOD PRESSURE: 143 MMHG | TEMPERATURE: 97.7 F | RESPIRATION RATE: 19 BRPM

## 2024-07-02 DIAGNOSIS — R14.0 BLOATING: ICD-10-CM

## 2024-07-02 DIAGNOSIS — K59.00 CONSTIPATION, UNSPECIFIED CONSTIPATION TYPE: ICD-10-CM

## 2024-07-02 DIAGNOSIS — R19.4 CHANGE IN BOWEL HABITS: ICD-10-CM

## 2024-07-02 PROCEDURE — 25010000002 PROPOFOL 10 MG/ML EMULSION: Performed by: NURSE ANESTHETIST, CERTIFIED REGISTERED

## 2024-07-02 PROCEDURE — 25810000003 LACTATED RINGERS PER 1000 ML

## 2024-07-02 PROCEDURE — 88305 TISSUE EXAM BY PATHOLOGIST: CPT | Performed by: INTERNAL MEDICINE

## 2024-07-02 RX ORDER — LIDOCAINE HYDROCHLORIDE 20 MG/ML
INJECTION, SOLUTION EPIDURAL; INFILTRATION; INTRACAUDAL; PERINEURAL AS NEEDED
Status: DISCONTINUED | OUTPATIENT
Start: 2024-07-02 | End: 2024-07-02 | Stop reason: SURG

## 2024-07-02 RX ORDER — PROPOFOL 10 MG/ML
VIAL (ML) INTRAVENOUS AS NEEDED
Status: DISCONTINUED | OUTPATIENT
Start: 2024-07-02 | End: 2024-07-02 | Stop reason: SURG

## 2024-07-02 RX ORDER — SODIUM CHLORIDE, SODIUM LACTATE, POTASSIUM CHLORIDE, CALCIUM CHLORIDE 600; 310; 30; 20 MG/100ML; MG/100ML; MG/100ML; MG/100ML
30 INJECTION, SOLUTION INTRAVENOUS CONTINUOUS
Status: DISCONTINUED | OUTPATIENT
Start: 2024-07-02 | End: 2024-07-02 | Stop reason: HOSPADM

## 2024-07-02 RX ADMIN — PROPOFOL 100 MG: 10 INJECTION, EMULSION INTRAVENOUS at 09:46

## 2024-07-02 RX ADMIN — SODIUM CHLORIDE, POTASSIUM CHLORIDE, SODIUM LACTATE AND CALCIUM CHLORIDE 30 ML/HR: 600; 310; 30; 20 INJECTION, SOLUTION INTRAVENOUS at 08:07

## 2024-07-02 RX ADMIN — PROPOFOL 165 MCG/KG/MIN: 10 INJECTION, EMULSION INTRAVENOUS at 09:47

## 2024-07-02 RX ADMIN — LIDOCAINE HYDROCHLORIDE 60 MG: 20 INJECTION, SOLUTION EPIDURAL; INFILTRATION; INTRACAUDAL; PERINEURAL at 09:47
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
## 2024-07-02 NOTE — ANESTHESIA POSTPROCEDURE EVALUATION
Patient: Zoya Crook    Procedure Summary       Date: 07/02/24 Room / Location: [facility] ENDOSCOPY 4 / [facility] ENDOSCOPY    Anesthesia Start: 0943 Anesthesia Stop: 1008    Procedure: COLONOSCOPY WITH COLD SNARE POLYPECTOMIES Diagnosis:       Change in bowel habits      Constipation, unspecified constipation type      Bloating      (Change in bowel habits [R19.4])      (Constipation, unspecified constipation type [K59.00])      (Bloating [R14.0])    Surgeons: Kris Albarran MD Provider: Andrea Enamorado CRNA    Anesthesia Type: general ASA Status: 3            Anesthesia Type: general    Vitals  Vitals Value Taken Time   /84 07/02/24 1032   Temp 36.5 °C (97.7 °F) 07/02/24 1031   Pulse 74 07/02/24 1032   Resp 19 07/02/24 1031   SpO2 96 % 07/02/24 1032   Vitals shown include unfiled device data.        Post Anesthesia Care and Evaluation    Post-procedure mental status: acceptable.  Pain management: satisfactory to patient    Airway patency: patent  Anesthetic complications: No anesthetic complications    Cardiovascular status: acceptable  Respiratory status: acceptable    Comments: Per chart review

## 2024-07-22 NOTE — PROGRESS NOTES
[facility] - Progress Note    Date: 07/23/2024  Time In: 10:00am   Time Out: 10:55am     Patient Legal Name: Zoya Crook  Patient Age: 53 y.o.    CHIEF COMPLAINT: mood     Subjective   History of Present Illness       Ligia returns today for ongoing treatment  Goals from our previous session on 0/24/24 were:   Supportive therapy for relief of symptom burden  Continue to monitor mood and remain compliant with all aspects of treatment.     Assessment    Mental Status Exam     Appearance: good hygiene and dressed appropriately for the weather  Behavior: calm  Cooperation:  engaged, cooperative, attentive, and friendly  Eye Contact:  good  Affect:  congruent, sad, and tearful  Mood: expressive and sad  Speech: responsive and talkative  Thought Process:  organized  Thought Content: appropriate  Suicidal: denies  Homicidal:  denies  Hallucinations:  denies  Memory:  intact  Orientation:  person, place, time, and situation  Reliability:  reliable  Insight:  good  Judgment:  good     Clinical Intervention       ICD-10-CM ICD-9-CM   1. Anxiety and depression  F41.9 300.00    F32.A 311   2. Grief  F43.21 309.0        Zoya is a 53 y.o. female who presents today as a follow-up for continued psychotherapy. Individual psychotherapy was provided utilizing solution focused techniques to restore adaptive functioning, provide symptom relief, promote emotion regulation, discuss values and strengths, manage stress, identify triggers, acknowledge sources of feelings and behaviors, assess symptoms, provide support, and discuss interpersonal conflicts.  Zoya discussed recent events that have been very difficult for her.  She is now processing death of her daughter that was murdered.  She states that most of the time this does not seem real to her.  She has periods of difficulty, excessive crying  She recognizes that she needs to feel these emotions.  She reports that she is sleeping.  She denies concerns with recent medication  change and states that she does not feel that she needs additional medications at this time.     Plan   Plan & Goals     Supportive therapy to relieve symptom burden  Grief education and therapy to process emotions      Patient acknowledged and verbally consented to continue working toward resolving current treatment plan goals and was educated on the importance of participation in the therapeutic process.  Patient will remain compliant with medication regimen as prescribed. Discuss any medication side effects, questions or concerns with prescribing provider.  Call 911 or present to the nearest emergency room in an emergency situation.  National Suicide Prevention Lifeline: Call 988. The Lifeline provides 24/7, free and confidential support for people in distress, prevention and crisis resources.  Crisis Text Line  Text HOME To 406828    Return in about 2 weeks (around 8/6/2024).    ____________________  This document has been electronically signed by Trudi Almeida LCSW  July 23, 2024 12:03 EDT    Part of this note may be an electronic transcription/translation of spoken language to printed text using the Dragon Dictation System.

## 2024-07-23 ENCOUNTER — OFFICE VISIT (OUTPATIENT)
Dept: PSYCHIATRY | Facility: CLINIC | Age: 54
End: 2024-07-23
Payer: COMMERCIAL

## 2024-07-23 DIAGNOSIS — F41.9 ANXIETY AND DEPRESSION: Primary | ICD-10-CM

## 2024-07-23 DIAGNOSIS — F32.A ANXIETY AND DEPRESSION: Primary | ICD-10-CM

## 2024-07-23 DIAGNOSIS — F43.21 GRIEF: ICD-10-CM

## 2024-07-23 PROCEDURE — 1159F MED LIST DOCD IN RCRD: CPT | Performed by: SOCIAL WORKER

## 2024-07-23 PROCEDURE — 1160F RVW MEDS BY RX/DR IN RCRD: CPT | Performed by: SOCIAL WORKER

## 2024-07-23 PROCEDURE — 90837 PSYTX W PT 60 MINUTES: CPT | Performed by: SOCIAL WORKER

## 2024-07-23 NOTE — PSYCHOTHERAPY NOTE
Doesn't seem real   On vacation     My sister wants to adopt youngest    Court dates in September,    Sister wants to adopt     Sleeping good   When I by myself  start crying     Med change  Did  not want to take any other medicine,  -additional to help- NO  Feel what I feel

## 2024-07-25 ENCOUNTER — OFFICE VISIT (OUTPATIENT)
Dept: PSYCHIATRY | Facility: CLINIC | Age: 54
End: 2024-07-25
Payer: COMMERCIAL

## 2024-07-25 VITALS
DIASTOLIC BLOOD PRESSURE: 72 MMHG | SYSTOLIC BLOOD PRESSURE: 133 MMHG | BODY MASS INDEX: 25.89 KG/M2 | HEART RATE: 87 BPM | WEIGHT: 155.4 LBS | HEIGHT: 65 IN

## 2024-07-25 DIAGNOSIS — F33.1 MAJOR DEPRESSIVE DISORDER, RECURRENT EPISODE, MODERATE: ICD-10-CM

## 2024-07-25 DIAGNOSIS — F51.05 INSOMNIA DUE TO MENTAL CONDITION: ICD-10-CM

## 2024-07-25 DIAGNOSIS — F43.21 GRIEF: Primary | ICD-10-CM

## 2024-07-25 DIAGNOSIS — F41.1 GENERALIZED ANXIETY DISORDER: ICD-10-CM

## 2024-07-25 RX ORDER — FLUTICASONE PROPIONATE 50 MCG
1 SPRAY, SUSPENSION (ML) NASAL 2 TIMES DAILY
COMMUNITY
Start: 2024-07-02

## 2024-07-25 RX ORDER — LOSARTAN POTASSIUM 25 MG/1
1 TABLET ORAL DAILY
COMMUNITY

## 2024-07-25 RX ORDER — MIRTAZAPINE 15 MG/1
15 TABLET, FILM COATED ORAL NIGHTLY
Qty: 90 TABLET | Refills: 1 | Status: SHIPPED | OUTPATIENT
Start: 2024-07-25

## 2024-07-25 RX ORDER — OXYCODONE HYDROCHLORIDE 5 MG/1
1 TABLET ORAL EVERY 12 HOURS SCHEDULED
COMMUNITY
Start: 2024-07-16

## 2024-07-25 RX ORDER — PREDNISONE 10 MG/1
TABLET ORAL SEE ADMIN INSTRUCTIONS
COMMUNITY

## 2024-07-25 NOTE — PATIENT INSTRUCTIONS
1.  Please return to clinic at your next scheduled visit.  Contact the clinic ([phone]) at least 24 hours prior in the event you need to cancel.  2.  Do no harm to yourself or others.    3.  Avoid alcohol and drugs.    4.  Take all medications as prescribed.  Please contact the clinic with any concerns. If you are in need of medication refills, please call the clinic at [phone].    5. Should you want to get in touch with your provider, Dr. Alberta Jones, please utilize [employer] or contact the office ([phone]), and staff will be able to page Dr. Jones directly.  6.  In the event you have personal crisis, contact the following crisis numbers: Suicide Prevention Hotline 1-325.373.9885; [facility] Helpline 5-454-394-JEREMIE; [facility] Emergency Room [phone]; text HELLO to 523472; or 704.

## 2024-07-25 NOTE — PROGRESS NOTES
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 should he/she begin having SI/HI. No medication side effects or related complaints today.     MEDS ORDERED DURING VISIT:  New Medications Ordered This Visit   Medications    mirtazapine (Remeron) 15 MG tablet     Sig: Take 1 tablet by mouth Every Night.     Dispense:  90 tablet     Refill:  1     refills       Return in about 3 weeks (around 8/15/2024).         This document has been electronically signed by Alberta Jones MD  July 25, 2024 13:18 EDT    Dictated Utilizing Dragon Dictation: Part of this note may be an electronic transcription/translation of spoken language to printed text using the Dragon Dictation System.

## 2024-07-29 NOTE — PROGRESS NOTES
[facility] - Progress Note    Date: 07/30/2024  Time In: 11:00am   Time Out: 11:55am     Patient Legal Name: Zoya Crook  Patient Age: 53 y.o.    CHIEF COMPLAINT: grief     Subjective   History of Present Illness       Zoya returns today for ongoing treatment  Goals from our previous session on 07/23/24 were:   Supportive therapy to relieve symptom burden  Grief education and therapy to process emotions       Assessment    Mental Status Exam     Appearance: good hygiene and dressed appropriately for the weather  Behavior: calm  Cooperation:  engaged, cooperative, attentive, and friendly  Eye Contact:  good  Affect:  sad and tearful  Mood: expressive and sad  Speech: responsive and talkative  Thought Process:  linear  Thought Content: appropriate  Suicidal: denies  Homicidal:  denies  Hallucinations:  denies  Memory:  intact  Orientation:  person, place, time, and situation  Reliability:  reliable  Insight:  good  Judgment:  good     Clinical Intervention       ICD-10-CM ICD-9-CM   1. Anxiety and depression  F41.9 300.00    F32.A 311   2. Grief  F43.21 309.0      Zoya is a 53 y.o. female who presents today as a follow-up for continued psychotherapy. Individual psychotherapy was provided utilizing solution focused  techniques to restore adaptive functioning, provide symptom relief, encourage expression of thoughts and feelings, support self-esteem, promote emotion regulation, manage stress, identify triggers, recognize patterns of behavior, acknowledge sources of feelings and behaviors, assess symptoms, challenge negative thinking patterns, provide support, and discuss interpersonal conflicts.  Zoya continues to deal with a heavy emotional burden from the recent loss of her daughter  She idenitifies most with anger at this time and we processed this further.     Plan   Plan & Goals     Continue supportive therapy for relief of symptom burden and adaptive funcioning  Continue to allow for discussion and pscho  education regarding grief     Patient acknowledged and verbally consented to continue working toward resolving current treatment plan goals and was educated on the importance of participation in the therapeutic process.  Patient will remain compliant with medication regimen as prescribed. Discuss any medication side effects, questions or concerns with prescribing provider.  Call 911 or present to the nearest emergency room in an emergency situation.  National Suicide Prevention Lifeline: Call 988. The Lifeline provides 24/7, free and confidential support for people in distress, prevention and crisis resources.  Crisis Text Line  Text HOME To 900531    Return in about 1 week (around 8/6/2024).    ____________________  This document has been electronically signed by Trudi Almeida LCSW  July 30, 2024 14:07 EDT    Part of this note may be an electronic transcription/translation of spoken language to printed text using the Dragon Dictation System.

## 2024-07-30 ENCOUNTER — OFFICE VISIT (OUTPATIENT)
Dept: PSYCHIATRY | Facility: CLINIC | Age: 54
End: 2024-07-30
Payer: COMMERCIAL

## 2024-07-30 DIAGNOSIS — F43.21 GRIEF: ICD-10-CM

## 2024-07-30 DIAGNOSIS — F41.9 ANXIETY AND DEPRESSION: Primary | ICD-10-CM

## 2024-07-30 DIAGNOSIS — F32.A ANXIETY AND DEPRESSION: Primary | ICD-10-CM

## 2024-07-30 PROCEDURE — 90837 PSYTX W PT 60 MINUTES: CPT | Performed by: SOCIAL WORKER

## 2024-07-30 PROCEDURE — 1160F RVW MEDS BY RX/DR IN RCRD: CPT | Performed by: SOCIAL WORKER

## 2024-07-30 PROCEDURE — 1159F MED LIST DOCD IN RCRD: CPT | Performed by: SOCIAL WORKER

## 2024-07-30 NOTE — PSYCHOTHERAPY NOTE
I'm angry...No images are attached to the encounter. Cussing  Problems with grandparent that has custody  Milvia - acts like she is ok but she is quiet  Can't go in the camper can't look at it,  When it rains I think about her..getting wet  Tension in body

## 2024-08-07 ENCOUNTER — OFFICE VISIT (OUTPATIENT)
Dept: ORTHOPEDIC SURGERY | Facility: CLINIC | Age: 54
End: 2024-08-07
Payer: COMMERCIAL

## 2024-08-07 VITALS
DIASTOLIC BLOOD PRESSURE: 77 MMHG | OXYGEN SATURATION: 97 % | HEART RATE: 68 BPM | SYSTOLIC BLOOD PRESSURE: 145 MMHG | WEIGHT: 155 LBS | BODY MASS INDEX: 25.83 KG/M2 | HEIGHT: 65 IN

## 2024-08-07 DIAGNOSIS — M51.16 LUMBAR DISC DISEASE WITH RADICULOPATHY: ICD-10-CM

## 2024-08-07 DIAGNOSIS — M25.551 RIGHT HIP PAIN: ICD-10-CM

## 2024-08-07 DIAGNOSIS — M16.11 ARTHRITIS OF RIGHT HIP: Primary | ICD-10-CM

## 2024-08-07 PROCEDURE — 99213 OFFICE O/P EST LOW 20 MIN: CPT | Performed by: PHYSICIAN ASSISTANT

## 2024-08-07 PROCEDURE — 3078F DIAST BP <80 MM HG: CPT | Performed by: PHYSICIAN ASSISTANT

## 2024-08-07 PROCEDURE — 3077F SYST BP >= 140 MM HG: CPT | Performed by: PHYSICIAN ASSISTANT

## 2024-08-07 NOTE — PROGRESS NOTES
"Chief Complaint  Follow-up and Pain of the Right Hip    Subjective          Zoya Crook presents to [facility]   History of Present Illness    Zoya Crook presents today for a follow-up of her right hip.  Patient has right hip arthritis and lumbar disc disease with radiculopathy.  Today, she states that she has pain and fatigue to her right lower extremity, especially with walking.  She continues to follow with Dr. Knox regarding her degenerative disc disease and radiculopathy.  She denies any new injuries to her hip.      Allergies   Allergen Reactions    Acetaminophen Unknown - High Severity    Naproxen Other (See Comments)     GI UPSET/ HX ULCER    Sulfamethoxazole Other (See Comments)    Ciprofloxacin Rash        Social History     Socioeconomic History    Marital status:    Tobacco Use    Smoking status: Every Day     Current packs/day: 0.50     Average packs/day: 0.7 packs/day for 45.0 years (30.0 ttl pk-yrs)     Types: Cigarettes     Passive exposure: Current    Smokeless tobacco: Never   Vaping Use    Vaping status: Never Used   Substance and Sexual Activity    Alcohol use: Yes     Comment: couple times a year    Drug use: Never    Sexual activity: Yes     Partners: Male     Birth control/protection: None        I reviewed the patient's chief complaint, history of present illness, review of systems, past medical history, surgical history, family history, social history, medications, and allergy list.     REVIEW OF SYSTEMS    Constitutional: Denies fevers, chills, weight loss  Cardiovascular: Denies chest pain, shortness of breath  Skin: Denies rashes, acute skin changes  Neurologic: Denies headache, loss of consciousness  MSK: Right hip pain      Objective   Vital Signs:   /77   Pulse 68   Ht 165.1 cm (65\")   Wt 70.3 kg (155 lb)   SpO2 97%   BMI 25.79 kg/m²     Body mass index is 25.79 kg/m².    Physical Exam    General: Alert. No acute distress.   Right " lower extremity: No groin pain with passive hip range of motion.  No groin pain with passive logroll of the hip.  Tenderness to the SI joint.  Nontender to the greater trochanteric bursa.  Hip flexion intact.  5 out of 5 hip flexion.  Positive straight leg raise.  Knee extensor mechanism intact.  Calf soft, nontender.  Demonstrates active ankle plantarflexion and dorsiflexion.  Sensation intact over dorsal and plantar foot.  Palpable pedal pulses.    Procedures    Imaging Results (Most Recent)       Procedure Component Value Units Date/Time    XR Hip With or Without Pelvis 2 - 3 View Right [[id number]] Resulted: 08/07/24 1232     Updated: 08/07/24 1255    Narrative:      Indications: Follow-up right hip arthritis    Views: AP and frog lateral right hip    Findings: No fractures.  Mild arthritic changes to the hip are seen.  Hip   is reduced.    Comparative Data: No comparative data available                   Assessment and Plan    Diagnoses and all orders for this visit:    1. Arthritis of right hip (Primary)    2. Lumbar disc disease with radiculopathy    3. Right hip pain  -     XR Hip With or Without Pelvis 2 - 3 View Right        Zoya Crook presents today to follow-up with her right hip arthritis and lumbar disc disease with radiculopathy.  X-rays were reviewed with the patient today.  We discussed that her symptoms were stemming from her back.  She is instructed to continue following with Dr. Knox.      Patient will follow up with our office as needed.      Call or return if symptoms worsen or patient has any concerns.       Follow Up   Return if symptoms worsen or fail to improve.  Patient was given instructions and counseling regarding her condition or for health maintenance advice. Please see specific information pulled into the AVS if appropriate.     Albania Phelps PA-C  08/07/24  12:55 EDT

## 2024-08-08 ENCOUNTER — OFFICE VISIT (OUTPATIENT)
Dept: PSYCHIATRY | Facility: CLINIC | Age: 54
End: 2024-08-08
Payer: COMMERCIAL

## 2024-08-08 DIAGNOSIS — F41.9 ANXIETY AND DEPRESSION: ICD-10-CM

## 2024-08-08 DIAGNOSIS — F43.21 GRIEF: Primary | ICD-10-CM

## 2024-08-08 DIAGNOSIS — F32.A ANXIETY AND DEPRESSION: ICD-10-CM

## 2024-08-08 RX ORDER — LINACLOTIDE 145 UG/1
145 CAPSULE, GELATIN COATED ORAL
Qty: 90 CAPSULE | Refills: 1 | Status: SHIPPED | OUTPATIENT
Start: 2024-08-08

## 2024-08-08 NOTE — PROGRESS NOTES
"[facility] - Progress Note    Date: 08/08/2024  Time In: 01:00pm   Time Out: 01:47pm    Patient Legal Name: Zoya Crook  Patient Age: 53 y.o.    CHIEF COMPLAINT: grief     Subjective   History of Present Illness       Zoya returns today for ongoing treatment  Goals from our previous session on 07/30/24 were:   Continue supportive therapy for relief of symptom burden and adaptive funcioning  Continue to allow for discussion and pscho education regarding grief        Assessment    Mental Status Exam     Appearance: good hygiene and dressed appropriately for the weather  Behavior: calm  Cooperation:  engaged, cooperative, attentive, and friendly  Eye Contact:  good  Affect:  congruent, angry, sad, and tearful  Mood: expressive and sad  Speech: responsive and talkative  Thought Process:  organized  Thought Content: appropriate  Suicidal: denies  Homicidal:  denies  Hallucinations:  denies  Memory:  intact  Orientation:  person, place, time, and situation  Reliability:  reliable  Insight:  good  Judgment:  good     Clinical Intervention       ICD-10-CM ICD-9-CM   1. Grief  F43.21 309.0   2. Anxiety and depression  F41.9 300.00    F32.A 311       Zoya is a 53 y.o. female who presents today as a follow-up for continued psychotherapy. Individual psychotherapy was provided utilizing solution focused  techniques to restore adaptive functioning, provide symptom relief, support self-esteem, promote emotion regulation, manage stress, identify triggers, recognize patterns of behavior, acknowledge sources of feelings and behaviors, assess symptoms, provide support, and discuss interpersonal conflicts.  Zoya continues to talk about feeling related to the loss of her daughter by homicide.  Provided article about losing a child to homicide. Discussed struggles with her emotions, always asking herself \"what if\".  She is also experiencing  many ongoing physical issues.  She has  a lot of joint pain and limited mobility due to " her pain.  She is cautious about taking pain medication.     Plan   Plan & Goals     Continue to process grief  Supportive therapy to relieve symptom burden.     Patient acknowledged and verbally consented to continue working toward resolving current treatment plan goals and was educated on the importance of participation in the therapeutic process.  Patient will remain compliant with medication regimen as prescribed. Discuss any medication side effects, questions or concerns with prescribing provider.  Call 911 or present to the nearest emergency room in an emergency situation.  National Suicide Prevention Lifeline: Call 988. The Lifeline provides 24/7, free and confidential support for people in distress, prevention and crisis resources.  Crisis Text Line  Text HOME To 294668    Return in about 1 week (around 8/15/2024).    ____________________  This document has been electronically signed by Trudi Almeida LCSW  August 8, 2024 13:53 EDT    Part of this note may be an electronic transcription/translation of spoken language to printed text using the Dragon Dictation System.    .

## 2024-08-08 NOTE — PSYCHOTHERAPY NOTE
Dont want to ask for help,  anything   Can't get away from it,      See therapist  lionel hammond,   Filed for  child support- tx   Feel guilty being happy  Talked about having moments- happy, sad,angry, overwhelmed  Difficulty dealing with the lady that has the kids, how she treats me, I'm sad for them, hard to handle it but I will.

## 2024-08-17 DIAGNOSIS — F33.1 MAJOR DEPRESSIVE DISORDER, RECURRENT EPISODE, MODERATE: ICD-10-CM

## 2024-08-17 DIAGNOSIS — F41.1 GENERALIZED ANXIETY DISORDER: ICD-10-CM

## 2024-08-17 DIAGNOSIS — F51.05 INSOMNIA DUE TO MENTAL CONDITION: ICD-10-CM

## 2024-08-19 RX ORDER — BUPROPION HYDROCHLORIDE 150 MG/1
150 TABLET ORAL EVERY MORNING
Qty: 30 TABLET | Refills: 2 | OUTPATIENT
Start: 2024-08-19

## 2024-08-19 RX ORDER — BUPROPION HYDROCHLORIDE 150 MG/1
150 TABLET ORAL EVERY MORNING
Qty: 90 TABLET | Refills: 1 | Status: SHIPPED | OUTPATIENT
Start: 2024-08-19

## 2024-08-19 NOTE — TELEPHONE ENCOUNTER
NEXT VISIT WITH PROVIDER   Appointment with Alberta Jones MD (08/23/2024)     LAST SEEN BY PROVIDER   Office Visit with Alberta Jones MD (07/25/2024)     LAST MED REFILL  buPROPion XL (Wellbutrin XL) 150 MG 24 hr tablet (06/12/2024)     TOO SOON FOR REFILL     PROVIDER PLEASE ADVISE

## 2024-08-21 NOTE — PROGRESS NOTES
[facility] - Progress Note    Date: 08/22/2024  Time In: 11:00am   Time Out: 11:53    Patient Legal Name: Zoya Crook  Patient Age: 53 y.o.    CHIEF COMPLAINT: grief     Subjective   History of Present Illness       Zoya returns today for continued treatment  Goals from our charanjit session on 08/08/24 were:   Continue to process grief  Supportive therapy to relieve symptom burden.        Assessment    Mental Status Exam     Appearance: good hygiene and dressed appropriately for the weather  Behavior: calm  Cooperation:  engaged, cooperative, attentive, and friendly  Eye Contact:  good  Affect:  congruent  Mood: expressive  Speech: responsive and talkative  Thought Process:  organized and goal-oriented  Thought Content: appropriate  Suicidal: denies  Homicidal:  denies  Hallucinations:  denies  Memory:  intact  Orientation:  person, place, time, and situation  Reliability:  reliable  Insight:  good  Judgment:  good     Clinical Intervention       ICD-10-CM ICD-9-CM   1. Grief  F43.21 309.0   2. Anxiety and depression  F41.9 300.00    F32.A 311   Zoya is a 53 y.o. female who presents today as a follow-up for continued psychotherapy. Individual psychotherapy was provided utilizing solution focused  techniques to provide symptom relief, promote emotion regulation, manage stress, identify triggers, assess symptoms, provide support, discuss interpersonal conflicts, and define and establish appropriate boundaries. Zoya reports some stabilization of mood, feeling more energy.  Discussed ongoing grief issues related to loss of her daughter .. She feels like she is able to take a step back and have some peace in her thinking about the situation.  She continues to be concerned about custody issues of grandchildren.  She is able to understand that she is not able to provide a home for them, it would not be healthy for  her.  She will continue to maintain a supportive role in their lives.       Plan   Plan & Goals      Ongoing grief support  Supportive therapy to relieve symptoms burden and promote adaptive skills     Patient acknowledged and verbally consented to continue working toward resolving current treatment plan goals and was educated on the importance of participation in the therapeutic process.  Patient will remain compliant with medication regimen as prescribed. Discuss any medication side effects, questions or concerns with prescribing provider.  Call 911 or present to the nearest emergency room in an emergency situation.  National Suicide Prevention Lifeline: Call 988. The Lifeline provides 24/7, free and confidential support for people in distress, prevention and crisis resources.  Crisis Text Line  Text HOME To 290526    Return in about 2 weeks (around 9/5/2024).    ____________________  This document has been electronically signed by Trudi Almeida LCSW  August 22, 2024 12:00 EDT    Part of this note may be an electronic transcription/translation of spoken language to printed text using the Dragon Dictation System.

## 2024-08-22 ENCOUNTER — OFFICE VISIT (OUTPATIENT)
Dept: PSYCHIATRY | Facility: CLINIC | Age: 54
End: 2024-08-22
Payer: COMMERCIAL

## 2024-08-22 DIAGNOSIS — F43.21 GRIEF: Primary | ICD-10-CM

## 2024-08-22 DIAGNOSIS — F41.9 ANXIETY AND DEPRESSION: ICD-10-CM

## 2024-08-22 DIAGNOSIS — F32.A ANXIETY AND DEPRESSION: ICD-10-CM

## 2024-08-22 NOTE — PSYCHOTHERAPY NOTE
Energy  Sleep sometimes a problem but mostly better   Never rest   Always doing, cleaning, cooking, laundry Unless sick     Can't get them because I dont have room     Other gma lost them       not her gma   lied about it   Mary to foster home   Cuong  son - close to him   Maybe I could ask him about taking them   Go this weekend    Alejandra, seeing therapy     At more peace about it,  her life was pain, struggle

## 2024-08-23 ENCOUNTER — OFFICE VISIT (OUTPATIENT)
Dept: PSYCHIATRY | Facility: CLINIC | Age: 54
End: 2024-08-23
Payer: COMMERCIAL

## 2024-08-23 VITALS
HEIGHT: 65 IN | SYSTOLIC BLOOD PRESSURE: 133 MMHG | WEIGHT: 155.6 LBS | BODY MASS INDEX: 25.92 KG/M2 | DIASTOLIC BLOOD PRESSURE: 77 MMHG | HEART RATE: 96 BPM

## 2024-08-23 DIAGNOSIS — F33.1 MAJOR DEPRESSIVE DISORDER, RECURRENT EPISODE, MODERATE: ICD-10-CM

## 2024-08-23 DIAGNOSIS — F41.1 GENERALIZED ANXIETY DISORDER: ICD-10-CM

## 2024-08-23 DIAGNOSIS — F43.21 GRIEF: Primary | ICD-10-CM

## 2024-08-23 DIAGNOSIS — F51.05 INSOMNIA DUE TO MENTAL CONDITION: ICD-10-CM

## 2024-08-23 NOTE — TREATMENT PLAN
Multi-Disciplinary Problems (from Behavioral Health Treatment Plan)      Active Problems       Problem: Anxiety  Start Date: 08/23/24      Problem Details: The patient self-scales this problem as a 1 with 10 being the worst.    grief, family conflict, relationships        Goal Priority Start Date Expected End Date End Date    Patient will develop and implement behavioral and cognitive strategies to reduce anxiety and irrational fears. -- 08/23/24 02/21/25 --    Goal Details: Progress toward goal:  improving          Goal Intervention Frequency Start Date End Date    Help patient explore past emotional issues in relation to present anxiety. Q Month 08/23/24 --    Intervention Details: Duration of treatment until remission of symptoms.          Goal Intervention Frequency Start Date End Date    Help patient develop an awareness of their cognitive and physical responses to anxiety. Q Month 08/23/24 --    Intervention Details: Duration of treatment until remission of symptoms.                  Problem: Depression  Start Date: 08/23/24      Problem Details: The patient self-scales this problem as a 1 with 10 being the worst.    grief, family conflict, relationships        Goal Priority Start Date Expected End Date End Date    Patient will demonstrate the ability to initiate new constructive life skills outside of sessions on a consistent basis. -- 08/23/24 02/21/25 --    Goal Details: Progress toward goal:  improving          Goal Intervention Frequency Start Date End Date    Assist patient in setting attainable activities of daily living goals. PRN 08/23/24 --      Goal Intervention Frequency Start Date End Date    Provide education about depression Q Month 08/23/24 --    Intervention Details: Duration of treatment until remission of symptoms.          Goal Intervention Frequency Start Date End Date    Assist patient in developing healthy coping strategies. Q Month 08/23/24 --    Intervention Details: Duration of  treatment until remission of symptoms.                  Problem: Grief and Loss  Start Date: 08/23/24      Problem Details: The patient self-scales this problem as a 5 with 10 being the worst.    grief, family conflict, relationships        Goal Priority Start Date Expected End Date End Date    Patient will process feelings and identify and implement specific ways to facilitate the grieving process. -- 08/23/24 02/21/25 --    Goal Details: Progress toward goal:  minimal improvement          Goal Intervention Frequency Start Date End Date    Assist patient in identifying and processing feelings about losses. Q Month 08/23/24 --    Intervention Details: Duration of treatment until remission of symptoms.          Goal Intervention Frequency Start Date End Date    Identify ways to grieve effectively and utilize healthy support systems Q Month 08/23/24 --    Intervention Details: Duration of treatment until remission of symptoms.                          Reviewed By       Alberta Jones MD 08/23/24 6625                     I have discussed and reviewed this treatment plan with the patient.

## 2024-08-23 NOTE — PATIENT INSTRUCTIONS
1.  Please return to clinic at your next scheduled visit.  Contact the clinic ([phone]) at least 24 hours prior in the event you need to cancel.  2.  Do no harm to yourself or others.    3.  Avoid alcohol and drugs.    4.  Take all medications as prescribed.  Please contact the clinic with any concerns. If you are in need of medication refills, please call the clinic at [phone].    5. Should you want to get in touch with your provider, Dr. Alberta Jones, please utilize [employer] or contact the office ([phone]), and staff will be able to page Dr. Jones directly.  6.  In the event you have personal crisis, contact the following crisis numbers: Suicide Prevention Hotline 1-931.751.5476; [facility] Helpline 0-958-255-JEREMIE; [facility] Emergency Room [phone]; text HELLO to 034695; or 073.

## 2024-08-23 NOTE — PLAN OF CARE
[facility] to help manage depression and anxiety related to grief, family conflict, relationships

## 2024-08-23 NOTE — PROGRESS NOTES
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           TREATMENT PLAN/GOALS: Continue supportive psychotherapy efforts and medications as indicated. Treatment and medication options discussed during today's visit. Patient acknowledged and verbally consented to continue with current treatment plan and was educated on the importance of compliance with treatment and follow-up appointments.    MEDICATION ISSUES:  OSIEL reviewed as expected.  Discussed medication options and treatment plan of prescribed medication as well as the risks, benefits, and side effects including potential falls, possible impaired driving and metabolic adversities among others. Patient is agreeable to call the office with any worsening of symptoms or onset of side effects. Patient is agreeable to call 911 or go to the nearest ER should he/she begin having SI/HI. No medication side effects or related complaints today.     MEDS ORDERED DURING VISIT:  No orders of the defined types were placed in this encounter.      Return in about 4 weeks (around 9/20/2024).         This document has been electronically signed by Alberta Jones MD  August 23, 2024 14:48 EDT    Dictated Utilizing Dragon Dictation: Part of this note may be an electronic  transcription/translation of spoken language to printed text using the Dragon Dictation System.

## 2024-08-27 DIAGNOSIS — E03.9 HYPOTHYROIDISM (ACQUIRED): Primary | ICD-10-CM

## 2024-08-27 RX ORDER — LEVOTHYROXINE SODIUM 75 UG/1
75 TABLET ORAL DAILY
Qty: 90 TABLET | Refills: 2 | Status: SHIPPED | OUTPATIENT
Start: 2024-08-27

## 2024-09-04 NOTE — PROGRESS NOTES
[facility] [place] [facility] - Progress Note    Date: 09/05/2024  Time In: 01:00pm  Time Out: 01:54pm    Patient Legal Name: Zoya Crook  Patient Age: 53 y.o.    CHIEF COMPLAINT: depression     Subjective   History of Present Illness   Zoya returns today for ongoing treatment  Goals form our previous session on 08/22/24 were:   Ongoing grief support  Supportive therapy to relieve symptoms burden and promote adaptive skills     Assessment    Mental Status Exam     Appearance: good hygiene and dressed appropriately for the weather  Behavior: calm  Cooperation:  engaged, cooperative, attentive, and friendly  Eye Contact:  good  Affect:  congruent and tearful  Mood: expressive  Speech: responsive  Thought Process:  organized and goal-oriented  Thought Content: appropriate  Suicidal: denies  Homicidal:  denies  Hallucinations:  denies  Memory:  intact  Orientation:  person, place, time, and situation  Reliability:  reliable  Insight:  good  Judgment:  good     Clinical Intervention       ICD-10-CM ICD-9-CM   1. Grief  F43.21 309.0   2. Anxiety and depression  F41.9 300.00    F32.A 311        Zoya is a 53 y.o. female who presents today as a follow-up for continued psychotherapy. Individual psychotherapy was provided utilizing solution focused  techniques to provide symptom relief, promote emotion regulation, manage stress, identify triggers, recognize patterns of behavior, identify strengths, build confidence, assess symptoms, and provide support.  Zoya reports stabilization of mood with current treatment.  She recognizes medication is helpful as she is doing very well despite her stressors and issues related to grief and loss.  She has decided to pursue fostering her grandchildren and feels good about this decision   We discussed the importance of self care and being able to ask for help when she needs it.   Plan   Plan & Goals     Prioritize self care and have a willingness to ask others for help  Supportive therapy to relive  symptom burden and promote adaptive skills     Patient acknowledged and verbally consented to continue working toward resolving current treatment plan goals and was educated on the importance of participation in the therapeutic process.  Patient will remain compliant with medication regimen as prescribed. Discuss any medication side effects, questions or concerns with prescribing provider.  Call 911 or present to the nearest emergency room in an emergency situation.  National Suicide Prevention Lifeline: Call 988. The Lifeline provides 24/7, free and confidential support for people in distress, prevention and crisis resources.  Crisis Text Line  Text HOME To 838300    No follow-ups on file.    ____________________  This document has been electronically signed by Trudi Almeida LCSW  September 5, 2024 14:00 EDT    Part of this note may be an electronic transcription/translation of spoken language to printed text using the Dragon Dictation System.

## 2024-09-05 ENCOUNTER — OFFICE VISIT (OUTPATIENT)
Dept: PSYCHIATRY | Facility: CLINIC | Age: 54
End: 2024-09-05
Payer: COMMERCIAL

## 2024-09-05 DIAGNOSIS — F43.21 GRIEF: Primary | ICD-10-CM

## 2024-09-05 DIAGNOSIS — F32.A ANXIETY AND DEPRESSION: ICD-10-CM

## 2024-09-05 DIAGNOSIS — F41.9 ANXIETY AND DEPRESSION: ICD-10-CM

## 2024-09-05 NOTE — PSYCHOTHERAPY NOTE
Imgoing toget my kids      4 18 month   Appts on Fridays  my sister will      maybe counseling for 5 yo   Went through the [facility]   Meds good, energy     Sleep ok     Respnsible to dght by taking kids

## 2024-09-27 NOTE — PROGRESS NOTES
[facility] [place] [facility] - Progress Note    Date: 10/01/2024  Time In: 10:58  Time Out: 11:46    Patient Legal Name: Zoya Crook  Patient Age: 54 y.o.    CHIEF COMPLAINT: mood     Subjective   History of Present Illness         Zoya return today for ongoing treatment Goals from our previous session on 09/05/24 were:   Prioritize self care and have a willingness to ask others for help  Supportive therapy to relive symptom burden and promote adaptive skills        Assessment    Mental Status Exam     Appearance: good hygiene and dressed appropriately for the weather  Behavior: calm  Cooperation:  engaged, cooperative, attentive, and friendly  Eye Contact:  good  Affect:  congruent and tearful  Mood: expressive  Speech: talkative  Thought Process:  organized  Thought Content: appropriate  Suicidal: denies  Homicidal:  denies  Hallucinations:  denies  Memory:  intact  Orientation:  person, place, time, and situation  Reliability:  reliable  Insight:  good  Judgment:  good     Clinical Intervention       ICD-10-CM ICD-9-CM   1. Grief  F43.21 309.0   2. Anxiety and depression  F41.9 300.00    F32.A 311       Zoya (54 y.o.) presents today as a follow-up for continued psychotherapy. Individual psychotherapy was provided utilizing solution focused  techniques to promote problem-solving, encourage expression of thoughts and feelings, support self-esteem, promote emotion regulation, manage stress, identify triggers, recognize patterns of behavior, identify strengths, assess symptoms, and provide support. Zoya continues to experience emotions related to grief and loss of daughter.   She has tearful moments expressing how she feels.  We talked about how she  may be coping through distraction and avoidance at times.  Discussed how having conversations with other family members about her feelings and thiers might be helpful for everyone.       Plan   Plan & Goals     Supportive therapy to relieve symptom burden and promote adaptive  skills     Patient acknowledged and verbally consented to continue working toward resolving current treatment plan goals and was educated on the importance of participation in the therapeutic process.  Patient will remain compliant with medication regimen as prescribed. Discuss any medication side effects, questions or concerns with prescribing provider.  Call 911 or present to the nearest emergency room in an emergency situation.  National Suicide Prevention Lifeline: Call 988. The Lifeline provides 24/7, free and confidential support for people in distress, prevention and crisis resources.  Crisis Text Line  Text HOME To 511879    Return in about 2 weeks (around 10/15/2024).    ____________________  This document has been electronically signed by Trudi Almeida LCSW  October 1, 2024 11:58 EDT    Part of this note may be an electronic transcription/translation of spoken language to printed text using the Dragon Dictation System.

## 2024-10-01 ENCOUNTER — OFFICE VISIT (OUTPATIENT)
Dept: PSYCHIATRY | Facility: CLINIC | Age: 54
End: 2024-10-01
Payer: COMMERCIAL

## 2024-10-01 DIAGNOSIS — F41.9 ANXIETY AND DEPRESSION: ICD-10-CM

## 2024-10-01 DIAGNOSIS — F32.A ANXIETY AND DEPRESSION: ICD-10-CM

## 2024-10-01 DIAGNOSIS — F43.21 GRIEF: Primary | ICD-10-CM

## 2024-10-01 NOTE — PSYCHOTHERAPY NOTE
Got them Saturday    Buildiing a  New house     Need appointmetns on Friday - my sister will keep them  paperwork to get him into school     Christian does not really talk  about renatta  Try to stay busy , dont think,  dont talk,   anxious that something might fall through , tell counselor about  christian preoccupied with death - afraid something will happen to me and

## 2024-10-11 ENCOUNTER — OFFICE VISIT (OUTPATIENT)
Dept: PSYCHIATRY | Facility: CLINIC | Age: 54
End: 2024-10-11
Payer: COMMERCIAL

## 2024-10-11 DIAGNOSIS — F41.9 ANXIETY AND DEPRESSION: ICD-10-CM

## 2024-10-11 DIAGNOSIS — F43.21 GRIEF: Primary | ICD-10-CM

## 2024-10-11 DIAGNOSIS — F32.A ANXIETY AND DEPRESSION: ICD-10-CM

## 2024-10-11 NOTE — PROGRESS NOTES
[facility] [place] Note    Date: 10/11/2024  Time In: -1:00pm  Time Out: 01:53pm    Patient Legal Name: Zoya Crook  Patient Age: 54 y.o.    CHIEF COMPLAINT: Mood     Subjective   History of Present Illness       Zoya returns today for ongoing treatment  Goals from our previous session on 10/01/24 were: Supportive therapy to relieve symptom burden and promote adaptive skills        Assessment    Mental Status Exam     Appearance: good hygiene and dressed appropriately for the weather  Behavior: calm  Cooperation:  engaged, cooperative, attentive, and friendly  Eye Contact:  good  Affect:  congruent  Mood: expressive  Speech: talkative  Thought Process:  organized and goal-oriented  Thought Content: appropriate  Suicidal: denies  Homicidal:  denies  Hallucinations:  denies  Memory:  intact  Orientation:  person, place, time, and situation  Reliability:  reliable  Insight:  good  Judgment:  good     Clinical Intervention       ICD-10-CM ICD-9-CM   1. Grief  F43.21 309.0   2. Anxiety and depression  F41.9 300.00    F32.A 311        Zoya (54 y.o.) presents today as a follow-up for continued psychotherapy. Individual psychotherapy was provided utilizing solution focused  techniques to provide symptom relief, encourage expression of thoughts and feelings, manage stress, recognize patterns of behavior, identify strengths, build confidence, assess symptoms, and provide support.    Zoya continues to adjust to caring for her young grandchildren.  It is very physically demanding but she feels that it has helped her in many ways.  She says that she is less sad and focuses less on the death and loss of her daughter.  She still has trouble at times sleeping, denies further depressive symptoms. We discussed further her success of her past and her sense of resilience.     Plan   Plan & Goals     Supportive therapy to relieve symptom burden and promote adaptive skills     Patient acknowledged and verbally consented to  continue working toward resolving current treatment plan goals and was educated on the importance of participation in the therapeutic process.  Patient will remain compliant with medication regimen as prescribed. Discuss any medication side effects, questions or concerns with prescribing provider.  Call 911 or present to the nearest emergency room in an emergency situation.  National Suicide Prevention Lifeline: Call 988. The Lifeline provides 24/7, free and confidential support for people in distress, prevention and crisis resources.  Crisis Text Line  Text HOME To 099955    Return in about 3 weeks (around 11/1/2024).    ____________________  This document has been electronically signed by Trudi Almeida LCSW  October 11, 2024 13:56 EDT    Part of this note may be an electronic transcription/translation of spoken language to printed text using the Dragon Dictation System.

## 2024-10-11 NOTE — PSYCHOTHERAPY NOTE
No paperwork yet    Tired   Dont have time to think about being sad     Son gets out next month, live in [facility]

## 2024-10-30 NOTE — PROGRESS NOTES
[facility] [place] [facility] - Progress Note    Date: 11/01/2024  Time In: 1:34  Time Out: 2:27pm     Patient Legal Name: Zoya Crook  Patient Age: 54 y.o.    CHIEF COMPLAINT: mood     Subjective   History of Present Illness       Zoya returns today for ongoing treatment  Goals from our previous session were to continue with  Supportive therapy to relieve symptom burden and promote adaptive skills     Assessment    Mental Status Exam     Appearance: good hygiene and dressed appropriately for the weather  Behavior: calm  Cooperation:  engaged, cooperative, attentive, and friendly  Eye Contact:  good  Affect:  congruent  Mood: expressive  Speech: responsive and talkative  Thought Process:  organized and goal-oriented  Thought Content: appropriate  Suicidal: denies  Homicidal:  denies  Hallucinations:  denies  Memory:  intact  Orientation:  person, place, time, and situation  Reliability:  reliable  Insight:  good  Judgment:  good     Clinical Intervention       ICD-10-CM ICD-9-CM   1. Grief  F43.21 309.0   2. Anxiety and depression  F41.9 300.00    F32.A 311        Zoya (54 y.o.) presents today as a follow-up for continued psychotherapy. Individual psychotherapy was provided utilizing solution focused techniques to restore adaptive functioning, encourage expression of thoughts and feelings, support self-esteem, discuss values and strengths, manage stress, identify triggers, recognize patterns of behavior, acknowledge sources of feelings and behaviors, build confidence, assess symptoms, and provide support. Zoya reports that she is doing well, staying very busy since she has custody of her grandsons.  She say that this has been very good for her.  This seems to give her a sense of purpose and she recognizes that she made the right decision to assume their care. She has little time to herself but has good support and canask for help from other family when she feels she really needs it.  Her son will be coming to stay with her  next week and he will hopefully help her and her  a great deal. She reports that her symptoms are well managed with current treatment.       Plan   Plan & Goals     Supportive therapy to relieve symptom burden and promote adaptive skills   Continue to process grief, emotions surrounding loss of daughter,       Patient acknowledged and verbally consented to continue working toward resolving current treatment plan goals and was educated on the importance of participation in the therapeutic process.  Patient will remain compliant with medication regimen as prescribed. Discuss any medication side effects, questions or concerns with prescribing provider.  Call 911 or present to the nearest emergency room in an emergency situation.  National Suicide Prevention Lifeline: Call 988. The Lifeline provides 24/7, free and confidential support for people in distress, prevention and crisis resources.  Crisis Text Line  Text HOME To 904184    Return in about 3 weeks (around 11/22/2024).    ____________________  This document has been electronically signed by Trudi Almeida LCSW  November 1, 2024 14:37 EDT    Part of this note may be an electronic transcription/translation of spoken language to printed text using the Dragon Dictation System.

## 2024-11-01 ENCOUNTER — OFFICE VISIT (OUTPATIENT)
Dept: PSYCHIATRY | Facility: CLINIC | Age: 54
End: 2024-11-01
Payer: COMMERCIAL

## 2024-11-01 DIAGNOSIS — F32.A ANXIETY AND DEPRESSION: ICD-10-CM

## 2024-11-01 DIAGNOSIS — F41.9 ANXIETY AND DEPRESSION: ICD-10-CM

## 2024-11-01 DIAGNOSIS — F43.21 GRIEF: Primary | ICD-10-CM

## 2024-11-01 NOTE — PLAN OF CARE
Initial plan, to reassess in 90 days  Zoya continues to express emotions regarding grief and loss

## 2024-11-01 NOTE — TREATMENT PLAN
Multi-Disciplinary Problems (from Behavioral Health Treatment Plan)      Active Problems       Problem: Grief and Loss  Start Date: 11/01/24      Problem Details: The patient self-scales this problem as a 5 with 10 being the worst.          Goal Priority Start Date Expected End Date End Date    Patient will process feelings and identify and implement specific ways to facilitate the grieving process. -- 11/01/24 05/02/25 --    Goal Details: Progress toward goal:  Not appropriate to rate progress toward goal since this is the initial treatment plan.          Goal Intervention Frequency Start Date End Date    Assist patient in identifying and processing feelings about losses. Weekly 11/01/24 --    Intervention Details: Duration of treatment until remission of symptoms.          Goal Intervention Frequency Start Date End Date    Identify ways to grieve effectively and utilize healthy support systems Weekly 11/01/24 --    Intervention Details: Duration of treatment until remission of symptoms.                                 I have discussed and reviewed this treatment plan with the patient.
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
## 2024-11-07 NOTE — PATIENT INSTRUCTIONS
1.  Please return to clinic at your next scheduled visit.  Contact the clinic ([phone]) at least 24 hours prior in the event you need to cancel.  2.  Do no harm to yourself or others.    3.  Avoid alcohol and drugs.    4.  Take all medications as prescribed.  Please contact the clinic with any concerns. If you are in need of medication refills, please call the clinic at [phone].    5. Should you want to get in touch with your provider, Dr. Alberta Jones, please utilize [employer] or contact the office ([phone]), and staff will be able to page Dr. Jones directly.  6.  In the event you have personal crisis, contact the following crisis numbers: Suicide Prevention Hotline 1-335.886.5685; [place] Helpline 5-245-209-JEREMIE; [facility] Emergency Room [phone]; text HELLO to 230698; or 119.

## 2024-11-08 ENCOUNTER — HOSPITAL ENCOUNTER (EMERGENCY)
Facility: HOSPITAL | Age: 54
Discharge: HOME | End: 2024-11-08
Attending: EMERGENCY MEDICINE
Payer: COMMERCIAL

## 2024-11-08 ENCOUNTER — OFFICE VISIT (OUTPATIENT)
Dept: PSYCHIATRY | Facility: CLINIC | Age: 54
End: 2024-11-08
Payer: COMMERCIAL

## 2024-11-08 ENCOUNTER — APPOINTMENT (OUTPATIENT)
Dept: GENERAL RADIOLOGY | Facility: HOSPITAL | Age: 54
End: 2024-11-08
Payer: COMMERCIAL

## 2024-11-08 VITALS
SYSTOLIC BLOOD PRESSURE: 127 MMHG | DIASTOLIC BLOOD PRESSURE: 70 MMHG | WEIGHT: 155.6 LBS | HEIGHT: 65 IN | HEART RATE: 62 BPM | BODY MASS INDEX: 25.92 KG/M2

## 2024-11-08 VITALS
WEIGHT: 156.09 LBS | SYSTOLIC BLOOD PRESSURE: 136 MMHG | TEMPERATURE: 97.9 F | OXYGEN SATURATION: 96 % | HEIGHT: 65 IN | BODY MASS INDEX: 26.01 KG/M2 | DIASTOLIC BLOOD PRESSURE: 80 MMHG | HEART RATE: 65 BPM | RESPIRATION RATE: 20 BRPM

## 2024-11-08 DIAGNOSIS — F43.21 GRIEF: Primary | ICD-10-CM

## 2024-11-08 DIAGNOSIS — F51.05 INSOMNIA DUE TO MENTAL CONDITION: ICD-10-CM

## 2024-11-08 DIAGNOSIS — J06.9 VIRAL UPPER RESPIRATORY TRACT INFECTION: Primary | ICD-10-CM

## 2024-11-08 DIAGNOSIS — F33.1 MAJOR DEPRESSIVE DISORDER, RECURRENT EPISODE, MODERATE: ICD-10-CM

## 2024-11-08 DIAGNOSIS — F41.1 GENERALIZED ANXIETY DISORDER: ICD-10-CM

## 2024-11-08 LAB
FLUAV SUBTYP SPEC NAA+PROBE: NOT DETECTED
FLUBV RNA ISLT QL NAA+PROBE: NOT DETECTED
RSV RNA NPH QL NAA+NON-PROBE: NOT DETECTED
S PYO AG THROAT QL: NEGATIVE
SARS-COV-2 RNA RESP QL NAA+PROBE: NOT DETECTED

## 2024-11-08 PROCEDURE — 87637 SARSCOV2&INF A&B&RSV AMP PRB: CPT | Performed by: EMERGENCY MEDICINE

## 2024-11-08 PROCEDURE — 71045 X-RAY EXAM CHEST 1 VIEW: CPT

## 2024-11-08 PROCEDURE — 87880 STREP A ASSAY W/OPTIC: CPT | Performed by: EMERGENCY MEDICINE

## 2024-11-08 PROCEDURE — 87081 CULTURE SCREEN ONLY: CPT | Performed by: EMERGENCY MEDICINE

## 2024-11-08 PROCEDURE — 99283 EMERGENCY DEPT VISIT LOW MDM: CPT

## 2024-11-08 RX ORDER — FLUTICASONE PROPIONATE 50 MCG
2 SPRAY, SUSPENSION (ML) NASAL DAILY
Qty: 11.1 ML | Refills: 0 | Status: SHIPPED | OUTPATIENT
Start: 2024-11-08

## 2024-11-08 RX ORDER — BUPROPION HYDROCHLORIDE 150 MG/1
150 TABLET ORAL EVERY MORNING
Qty: 90 TABLET | Refills: 1 | Status: SHIPPED | OUTPATIENT
Start: 2024-11-08

## 2024-11-08 RX ORDER — GUAIFENESIN 200 MG/10ML
200 LIQUID ORAL EVERY 4 HOURS PRN
Qty: 180 ML | Refills: 0 | Status: SHIPPED | OUTPATIENT
Start: 2024-11-08

## 2024-11-08 RX ORDER — MIRTAZAPINE 15 MG/1
15 TABLET, FILM COATED ORAL NIGHTLY
Qty: 90 TABLET | Refills: 1 | Status: SHIPPED | OUTPATIENT
Start: 2024-11-08

## 2024-11-08 NOTE — DISCHARGE INSTRUCTIONS
Please help with your primary care provider Monday if symptoms persist.  Return to the ED for any chest pain, shortness of breath, fevers greater than 100.4 or any other new or worsening concerning symptoms.
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
## 2024-11-10 LAB — BACTERIA SPEC AEROBE CULT: NORMAL

## 2024-11-20 ENCOUNTER — OFFICE VISIT (OUTPATIENT)
Dept: PODIATRY | Facility: CLINIC | Age: 54
End: 2024-11-20
Payer: COMMERCIAL

## 2024-11-20 VITALS
OXYGEN SATURATION: 94 % | TEMPERATURE: 98 F | WEIGHT: 156 LBS | DIASTOLIC BLOOD PRESSURE: 78 MMHG | SYSTOLIC BLOOD PRESSURE: 151 MMHG | BODY MASS INDEX: 25.99 KG/M2 | HEIGHT: 65 IN | HEART RATE: 83 BPM

## 2024-11-20 DIAGNOSIS — Q82.8 POROKERATOSIS: ICD-10-CM

## 2024-11-20 DIAGNOSIS — M21.621 TAILOR'S BUNION OF RIGHT FOOT: Primary | ICD-10-CM
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
## 2024-11-21 NOTE — PROGRESS NOTES
[facility] - Progress Note    Date: 11/22/2024  Time In: 03:00pm  Time Out: 3:53pm    Patient Legal Name: Zoya Crook  Patient Age: 54 y.o.    CHIEF COMPLAINT: mood     Subjective   History of Present Illness     Zoya returns today for ongoing treatment  Goals from our most recent session were to continue with   Supportive therapy to relieve symptom burden and promote adaptive skills   Continue to process grief, emotions surrounding loss of daughter,        Assessment    Mental Status Exam     Appearance: good hygiene and dressed appropriately for the weather  Behavior: calm  Cooperation:  engaged, cooperative, attentive, and friendly  Eye Contact:  good  Affect:  congruent  Mood: expressive  Speech: responsive and talkative  Thought Process:  organized  Thought Content: appropriate  Suicidal: denies  Homicidal:  denies  Hallucinations:  denies  Memory:  intact  Orientation:  person, place, time, and situation  Reliability:  reliable  Insight:  good  Judgment:  good     Clinical Intervention       ICD-10-CM ICD-9-CM   1. Anxiety and depression  F41.9 300.00    F32.A 311   2. Grief  F43.21 309.0      Zoya (54 y.o.) presents today as a follow-up for continued psychotherapy. Individual psychotherapy was provided utilizing solution focused  techniques to restore adaptive functioning, encourage expression of thoughts and feelings, support self-esteem, promote emotion regulation, manage stress, identify triggers, recognize patterns of behavior, assess symptoms, and provide support. Zoya reports that her mood is good.  She recognizes improvement in current medication regimen.  She acknowledges moments of sadness related to grief and loss but she says that she feels what she experiences is normal.  She feels happy about being able to care for her grandchildren and make a better life for them  She has good family support from her adult children, sister, brother and .      Plan   Plan & Goals     Supportive  therapy to relieve symptom burden and promote adaptive skills     Patient acknowledged and verbally consented to continue working toward resolving current treatment plan goals and was educated on the importance of participation in the therapeutic process.  Patient will remain compliant with medication regimen as prescribed. Discuss any medication side effects, questions or concerns with prescribing provider.  Call 911 or present to the nearest emergency room in an emergency situation.  National Suicide Prevention Lifeline: Call 988. The Lifeline provides 24/7, free and confidential support for people in distress, prevention and crisis resources.  Crisis Text Line  Text HOME To 509085    Return in about 4 weeks (around 12/20/2024).    ____________________  This document has been electronically signed by Trudi Almeida LCSW  November 22, 2024 15:57 EST    Part of this note may be an electronic transcription/translation of spoken language to printed text using the Dragon Dictation System.

## 2024-11-22 ENCOUNTER — OFFICE VISIT (OUTPATIENT)
Dept: PSYCHIATRY | Facility: CLINIC | Age: 54
End: 2024-11-22
Payer: COMMERCIAL

## 2024-11-22 DIAGNOSIS — F43.21 GRIEF: ICD-10-CM

## 2024-11-22 DIAGNOSIS — F41.9 ANXIETY AND DEPRESSION: Primary | ICD-10-CM

## 2024-11-22 DIAGNOSIS — F32.A ANXIETY AND DEPRESSION: Primary | ICD-10-CM

## 2024-11-22 NOTE — PSYCHOTHERAPY NOTE
Take son for medical insurance and appt   House,   Go to sister for thanksgiving  Moments of sadness, normal  Overall I feel happy, right here in my chest, those kids

## 2024-12-15 DIAGNOSIS — F33.1 MAJOR DEPRESSIVE DISORDER, RECURRENT EPISODE, MODERATE: ICD-10-CM

## 2024-12-15 DIAGNOSIS — E03.9 HYPOTHYROIDISM (ACQUIRED): ICD-10-CM

## 2024-12-16 RX ORDER — MIRTAZAPINE 15 MG/1
15 TABLET, FILM COATED ORAL NIGHTLY
Qty: 90 TABLET | Refills: 1 | OUTPATIENT
Start: 2024-12-16

## 2024-12-16 RX ORDER — LEVOTHYROXINE SODIUM 75 UG/1
75 TABLET ORAL DAILY
Qty: 90 TABLET | Refills: 2 | Status: SHIPPED | OUTPATIENT
Start: 2024-12-16

## 2024-12-16 NOTE — TELEPHONE ENCOUNTER
NEXT VISIT WITH PROVIDER   Appointment with Alberta Jones MD (01/07/2025)     LAST SEEN BY PROVIDER   Office Visit with Alberta Jones MD (11/08/2024)     LAST MED REFILL  mirtazapine (Remeron) 15 MG tablet (11/08/2024)     TOO SOON FOR REFILL     PROVIDER PLEASE ADVISE

## 2025-01-02 DIAGNOSIS — E03.9 HYPOTHYROIDISM (ACQUIRED): ICD-10-CM

## 2025-01-02 DIAGNOSIS — E05.00 GRAVES DISEASE: ICD-10-CM

## 2025-01-02 DIAGNOSIS — E06.3 HASHIMOTO'S THYROIDITIS: Primary | ICD-10-CM
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
## 2025-01-07 ENCOUNTER — OFFICE VISIT (OUTPATIENT)
Dept: PSYCHIATRY | Facility: CLINIC | Age: 55
End: 2025-01-07
Payer: COMMERCIAL

## 2025-01-07 VITALS
SYSTOLIC BLOOD PRESSURE: 152 MMHG | DIASTOLIC BLOOD PRESSURE: 86 MMHG | BODY MASS INDEX: 26.42 KG/M2 | HEART RATE: 76 BPM | HEIGHT: 65 IN | WEIGHT: 158.6 LBS

## 2025-01-07 DIAGNOSIS — F33.42 RECURRENT MAJOR DEPRESSIVE DISORDER, IN FULL REMISSION: ICD-10-CM

## 2025-01-07 DIAGNOSIS — F41.1 GENERALIZED ANXIETY DISORDER: ICD-10-CM

## 2025-01-07 DIAGNOSIS — F51.05 INSOMNIA DUE TO MENTAL CONDITION: ICD-10-CM

## 2025-01-07 DIAGNOSIS — F43.21 GRIEF: Primary | ICD-10-CM

## 2025-01-07 NOTE — TREATMENT PLAN
Multi-Disciplinary Problems (from Behavioral Health Treatment Plan)      Active Problems       Problem: Anxiety  Start Date: 01/07/25      Problem Details: The patient self-scales this problem as a 2 with 10 being the worst.   grief, family conflict, relationships        Goal Priority Start Date Expected End Date End Date    Patient will develop and implement behavioral and cognitive strategies to reduce anxiety and irrational fears. -- 01/07/25 07/08/25 --    Goal Details: Progress toward goal:  improving          Goal Intervention Frequency Start Date End Date    Help patient explore past emotional issues in relation to present anxiety. Q Month 01/07/25 --    Intervention Details: Duration of treatment until remission of symptoms.          Goal Intervention Frequency Start Date End Date    Help patient develop an awareness of their cognitive and physical responses to anxiety. Q Month 01/07/25 --    Intervention Details: Duration of treatment until remission of symptoms.                  Problem: Depression  Start Date: 01/07/25      Problem Details: The patient self-scales this problem as a 3 with 10 being the worst.   grief, family conflict, relationships        Goal Priority Start Date Expected End Date End Date    Patient will demonstrate the ability to initiate new constructive life skills outside of sessions on a consistent basis. -- 01/07/25 07/08/25 --    Goal Details: Progress toward goal:  improving          Goal Intervention Frequency Start Date End Date    Assist patient in setting attainable activities of daily living goals. PRN 01/07/25 --      Goal Intervention Frequency Start Date End Date    Provide education about depression Q Month 01/07/25 --    Intervention Details: Duration of treatment until remission of symptoms.          Goal Intervention Frequency Start Date End Date    Assist patient in developing healthy coping strategies. Q Month 01/07/25 --    Intervention Details: Duration of  treatment until remission of symptoms.                  Problem: Grief and Loss  Start Date: 01/07/25      Problem Details: The patient self-scales this problem as a 6 with 10 being the worst.          Goal Priority Start Date Expected End Date End Date    Patient will process feelings and identify and implement specific ways to facilitate the grieving process. -- 01/07/25 07/08/25 --    Goal Details: Progress toward goal:  improving slowly          Goal Intervention Frequency Start Date End Date    Assist patient in identifying and processing feelings about losses. Q Month 01/07/25 --    Intervention Details: Duration of treatment until remission of symptoms.          Goal Intervention Frequency Start Date End Date    Identify ways to grieve effectively and utilize healthy support systems Q Month 01/07/25 --    Intervention Details: Duration of treatment until remission of symptoms.                          Reviewed By       Alberta Jones MD 01/07/25 [id number]                     I have discussed and reviewed this treatment plan with the patient.

## 2025-01-23 NOTE — PROGRESS NOTES
[facility] - [facility] Note    Date: 01/24/2025  Time In: 02:00pm  Time Out: 2:42pm     Patient Legal Name: Zoya Crook  Patient Age: 54 y.o.    CHIEF COMPLAINT: mood     Subjective   History of Present Illness       Zoya returns today for ongoing treatment  Goals from our previous session on 11/22/24 were to continue with   Supportive therapy to relieve symptom burden and promote adaptive skills         Assessment    Mental Status Exam     Appearance: good hygiene and dressed appropriately for the weather  Behavior: calm  Cooperation:  engaged, cooperative, attentive, and friendly  Eye Contact:  good  Affect:  congruent  Mood: expressive  Speech: responsive and talkative  Thought Process:  organized and goal-oriented  Thought Content: appropriate  Suicidal: denies  Homicidal:  denies  Hallucinations:  denies  Memory:  intact  Orientation:  person, place, time, and situation  Reliability:  reliable  Insight:  good  Judgment:  good     Clinical Intervention       ICD-10-CM ICD-9-CM   1. Anxiety and depression  F41.9 300.00    F32.A 311   2. Grief  F43.21 309.0   Zoya (54 y.o.) presents today as a follow-up for continued psychotherapy. Individual psychotherapy was provided utilizing solution focused  techniques to provide symptom relief, encourage expression of thoughts and feelings, promote emotion regulation, manage stress, acknowledge sources of feelings and behaviors, assess symptoms, provide support, and discuss interpersonal conflicts. Zoya reports that she is continuing to do well. She is continuing to seek custody of grandchildren.  She I very busy with their care.  This provides her with a sense of purpose.  At times she feels that she  has difficulty feeling a sense of gina.  However, she denies overwhelming sadness and grief and tries to focus on the care of the grandchildren.      Plan   Plan & Goals     Supportive therapy to relieve symptom burden and promote adaptive skills       Patient  acknowledged and verbally consented to continue working toward resolving current treatment plan goals and was educated on the importance of participation in the therapeutic process.  Patient will remain compliant with medication regimen as prescribed. Discuss any medication side effects, questions or concerns with prescribing provider.  Call 911 or present to the nearest emergency room in an emergency situation.  National Suicide Prevention Lifeline: Call 988. The Lifeline provides 24/7, free and confidential support for people in distress, prevention and crisis resources.  Crisis Text Line  Text HOME To 507102    Return in about 6 weeks (around 3/7/2025), or if symptoms worsen or fail to improve.    ____________________  This document has been electronically signed by Trudi Almeida LCSW  January 24, 2025 14:48 EST    Part of this note may be an electronic transcription/translation of spoken language to printed text using the Dragon Dictation System.

## 2025-01-24 ENCOUNTER — OFFICE VISIT (OUTPATIENT)
Dept: PSYCHIATRY | Facility: CLINIC | Age: 55
End: 2025-01-24
Payer: COMMERCIAL

## 2025-01-24 DIAGNOSIS — F41.9 ANXIETY AND DEPRESSION: Primary | ICD-10-CM

## 2025-01-24 DIAGNOSIS — F43.21 GRIEF: ICD-10-CM

## 2025-01-24 DIAGNOSIS — F32.A ANXIETY AND DEPRESSION: Primary | ICD-10-CM

## 2025-01-24 NOTE — PSYCHOTHERAPY NOTE
Alejandra, sharing  tough  [place] - just started menstration    Wait till you get kids   they come on Wednesday - papers CPS    6months  full custoday       Sin  david  working ob  getting  help   Thinks depressed     Classes  driving- son-  several weeks  When weather is better it will help  Hope to get in house by summer

## 2025-03-04 ENCOUNTER — OFFICE VISIT (OUTPATIENT)
Dept: PSYCHIATRY | Facility: CLINIC | Age: 55
End: 2025-03-04
Payer: COMMERCIAL

## 2025-03-04 DIAGNOSIS — F41.9 ANXIETY AND DEPRESSION: Primary | ICD-10-CM

## 2025-03-04 DIAGNOSIS — F43.21 GRIEF: ICD-10-CM

## 2025-03-04 DIAGNOSIS — F32.A ANXIETY AND DEPRESSION: Primary | ICD-10-CM

## 2025-03-04 NOTE — TREATMENT PLAN
Multi-Disciplinary Problems (from Behavioral Health Treatment Plan)      Active Problems       Problem: Depression  Start Date: 03/04/25      Problem Details: The patient self-scales this problem as a 6 with 10 being the worst.          Goal Priority Start Date Expected End Date End Date    Patient will demonstrate the ability to initiate new constructive life skills outside of sessions on a consistent basis. -- 03/04/25 09/02/25 --    Goal Details: Progress toward goal:  Not appropriate to rate progress toward goal since this is the initial treatment plan.          Goal Intervention Frequency Start Date End Date    Assist patient in setting attainable activities of daily living goals. PRN 03/04/25 --      Goal Intervention Frequency Start Date End Date    Provide education about depression Q Month 03/04/25 --    Intervention Details: Duration of treatment until remission of symptoms.          Goal Intervention Frequency Start Date End Date    Assist patient in developing healthy coping strategies. Monthly 03/04/25 --    Intervention Details: Duration of treatment until remission of symptoms.                                 I have discussed and reviewed this treatment plan with the patient.

## 2025-03-04 NOTE — PROGRESS NOTES
[facility] - Progress Note    Date: 03/04/2025  Time In: 2:00pm  Time Out: 2;53pm     Patient Legal Name: Zoya Crook  Patient Age: 54 y.o.    CHIEF COMPLAINT: mood     Subjective   History of Present Illness       Zoya returns today for ongoing treatment  Goals from our previous session on 01/24/25 were:   Supportive therapy to relieve symptom burden and promote adaptive skills        Assessment    Mental Status Exam     Appearance: good hygiene and dressed appropriately for the weather  Behavior: calm  Cooperation:  engaged, cooperative, attentive, and friendly  Eye Contact:  good  Affect:  congruent  Mood: expressive  Speech: responsive and talkative  Thought Process:  organized  Thought Content: appropriate  Suicidal: denies  Homicidal:  denies  Hallucinations:  denies  Memory:  intact  Orientation:  person, place, time, and situation  Reliability:  reliable  Insight:  good  Judgment:  good     Clinical Intervention       ICD-10-CM ICD-9-CM   1. Anxiety and depression  F41.9 300.00    F32.A 311   2. Grief  F43.21 309.0        Zoya (54 y.o.) presents today as a follow-up for continued psychotherapy. Individual psychotherapy was provided utilizing solution focused  techniques to provide symptom relief, support self-esteem, promote emotion regulation, manage stress, identify triggers, recognize patterns of behavior, acknowledge sources of feelings and behaviors, challenge negative thinking patterns, provide support, and discuss interpersonal conflicts. Discussed ongoing issues related to grief and loss with daughter.  Zoya states that she mk best by focusing on raising her grandchildren.  She still allows herself to be sad at times, special occasions are difficult.  NO concerns noted regarding other symptoms.  She vocies some ononging concerns about her health. She recognizes the need for occasional rest and self care and will ask other family for help occasionally.        Plan   Plan & Goals      Continue to ask for help and make time for self care  Supportive therapy to relieve symptom burden and promote adaptive skills     Patient acknowledged and verbally consented to continue working toward resolving current treatment plan goals and was educated on the importance of participation in the therapeutic process.  Patient will remain compliant with medication regimen as prescribed. Discuss any medication side effects, questions or concerns with prescribing provider.  Call 911 or present to the nearest emergency room in an emergency situation.  National Suicide Prevention Lifeline: Call 988. The Lifeline provides 24/7, free and confidential support for people in distress, prevention and crisis resources.  Crisis Text Line  Text HOME To 350498    Return in about 3 weeks (around 3/25/2025).    ____________________  This document has been electronically signed by Trudi Almeida LCSW  March 4, 2025 15:00 EST    Part of this note may be an electronic transcription/translation of spoken language to printed text using the Dragon Dictation System.

## 2025-03-04 NOTE — PSYCHOTHERAPY NOTE
Every 15 sdays at least   need rest     Son  cars - brother buys them and he works on them and sells them,  Strengths perspective  Still sad at time,s  focus on kids

## 2025-03-13 RX ORDER — LINACLOTIDE 145 UG/1
145 CAPSULE, GELATIN COATED ORAL
Qty: 90 CAPSULE | Refills: 1 | OUTPATIENT
Start: 2025-03-13

## 2025-04-07 ENCOUNTER — OFFICE VISIT (OUTPATIENT)
Dept: PSYCHIATRY | Facility: CLINIC | Age: 55
End: 2025-04-07
Payer: COMMERCIAL

## 2025-04-07 VITALS
BODY MASS INDEX: 25.96 KG/M2 | WEIGHT: 155.8 LBS | DIASTOLIC BLOOD PRESSURE: 74 MMHG | SYSTOLIC BLOOD PRESSURE: 148 MMHG | HEIGHT: 65 IN | HEART RATE: 77 BPM

## 2025-04-07 DIAGNOSIS — F41.1 GENERALIZED ANXIETY DISORDER: ICD-10-CM

## 2025-04-07 DIAGNOSIS — F43.21 GRIEF: Primary | ICD-10-CM

## 2025-04-07 DIAGNOSIS — F33.42 RECURRENT MAJOR DEPRESSIVE DISORDER, IN FULL REMISSION: ICD-10-CM

## 2025-04-07 DIAGNOSIS — F51.05 INSOMNIA DUE TO MENTAL CONDITION: ICD-10-CM

## 2025-04-07 PROCEDURE — 3077F SYST BP >= 140 MM HG: CPT | Performed by: STUDENT IN AN ORGANIZED HEALTH CARE EDUCATION/TRAINING PROGRAM

## 2025-04-07 PROCEDURE — 3078F DIAST BP <80 MM HG: CPT | Performed by: STUDENT IN AN ORGANIZED HEALTH CARE EDUCATION/TRAINING PROGRAM

## 2025-04-07 PROCEDURE — 1160F RVW MEDS BY RX/DR IN RCRD: CPT | Performed by: STUDENT IN AN ORGANIZED HEALTH CARE EDUCATION/TRAINING PROGRAM

## 2025-04-07 PROCEDURE — 1159F MED LIST DOCD IN RCRD: CPT | Performed by: STUDENT IN AN ORGANIZED HEALTH CARE EDUCATION/TRAINING PROGRAM

## 2025-04-07 PROCEDURE — 99214 OFFICE O/P EST MOD 30 MIN: CPT | Performed by: STUDENT IN AN ORGANIZED HEALTH CARE EDUCATION/TRAINING PROGRAM

## 2025-04-07 PROCEDURE — 90833 PSYTX W PT W E/M 30 MIN: CPT | Performed by: STUDENT IN AN ORGANIZED HEALTH CARE EDUCATION/TRAINING PROGRAM

## 2025-04-07 RX ORDER — MIRTAZAPINE 15 MG/1
15 TABLET, FILM COATED ORAL NIGHTLY
Qty: 90 TABLET | Refills: 3 | Status: SHIPPED | OUTPATIENT
Start: 2025-04-07

## 2025-04-07 RX ORDER — BUPROPION HYDROCHLORIDE 150 MG/1
150 TABLET ORAL EVERY MORNING
Qty: 90 TABLET | Refills: 3 | Status: SHIPPED | OUTPATIENT
Start: 2025-04-07
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
## 2025-04-07 NOTE — TREATMENT PLAN
Anxiety:  2/10 progressing    Goals:  Patient will develop and implement behavioral and cognitive strategies to reduce anxiety and irrational fears, monthly, using Rogerian psychotherapy and CBT where appropriate.  Help patient explore past emotional issues in relation to present anxiety, monthly, until remission of symptoms, using Rogerian psychotherapy and CBT where appropriate.  Help patient develop an awareness of their cognitive and physical responses to anxiety, monthly, until remission of symptoms, using Rogerian psychotherapy and CBT where appropriate.          Depression:  3/10 progressing    Goals:  Patient will demonstrate the ability to initiate new constructive life skills outside of sessions on a consistent basis, monthly, using Rogerian psychotherapy and CBT where appropriate.  Assist patient in setting attainable activities of daily living goals, monthly, using Rogerian psychotherapy and CBT where appropriate.  Provide education about depression, monthly, using Rogerian psychotherapy and CBT where appropriate.  Assist patient in developing healthy coping strategies, monthly, using Rogerian psychotherapy and CBT where appropriate.    Rogerian psychotherapy and CBT will be used to help accomplish the above goals and manage depression and anxiety related to grief, family conflict, relationships       I have discussed and reviewed this treatment plan with the patient.      Reviewed by Alberta Jones MD   04/07/2025

## 2025-04-09 NOTE — PROGRESS NOTES
[facility] - Progress Note    Date: 04/10/2025  Time In: 02:00  Time Out: 02:34    Patient Legal Name: Zoya Crook  Patient Age: 54 y.o.    CHIEF COMPLAINT: mood     Subjective   History of Present Illness       Zoya returns today for ongoing treatment  Goals from our previous session on 3/04/25 were: Continue to ask for help and make time for self care  Supportive therapy to relieve symptom burden and promote adaptive skills     Assessment    Mental Status Exam     Appearance: good hygiene and dressed appropriately for the weather  Behavior: calm  Cooperation:  engaged, cooperative, attentive, and friendly  Eye Contact:  good  Affect:  congruent  Mood: expressive  Speech: responsive and talkative  Thought Process:  organized and goal-oriented  Thought Content: appropriate  Suicidal: denies  Homicidal:  denies  Hallucinations:  denies  Memory:  intact  Orientation:  person, place, time, and situation  Reliability:  reliable  Insight:  good  Judgment:  good     Clinical Intervention       ICD-10-CM ICD-9-CM   1. Anxiety and depression  F41.9 300.00    F32.A 311   2. Grief  F43.21 309.0        Zoya (54 y.o.) presents today as a follow-up for continued psychotherapy. Individual psychotherapy was provided utilizing solution focused techniques to support self-esteem, promote emotion regulation, manage stress, acknowledge sources of feelings and behaviors, identify strengths, build confidence, assess symptoms, and provide support Zoya reports ongoing stabilization onf symptoms  She had a recent change in thyroid medication and may have experienced some improvement in mood and energy level. She had an appointment this week with Dr Jones and no medication changes were indicated.  She admits to some difficult days related to primarily grief. However, she stays very busy with caring for grandchildren and finds purpose in doing this.     Plan   Plan & Goals     Supportive therapy to relieve symptom burden and promote  adaptive skills     Patient acknowledged and verbally consented to continue working toward resolving current treatment plan goals and was educated on the importance of participation in the therapeutic process.  Patient will remain compliant with medication regimen as prescribed. Discuss any medication side effects, questions or concerns with prescribing provider.  Call 911 or present to the nearest emergency room in an emergency situation.  National Suicide Prevention Lifeline: Call 988. The Lifeline provides 24/7, free and confidential support for people in distress, prevention and crisis resources.  Crisis Text Line  Text HOME To 981787    Return in about 4 weeks (around 5/8/2025).    ____________________  This document has been electronically signed by Trudi Almeida LCSW  April 10, 2025 14:48 EDT    Part of this note may be an electronic transcription/translation of spoken language to printed text using the Dragon Dictation System.

## 2025-04-10 ENCOUNTER — OFFICE VISIT (OUTPATIENT)
Dept: PSYCHIATRY | Facility: CLINIC | Age: 55
End: 2025-04-10
Payer: COMMERCIAL

## 2025-04-10 DIAGNOSIS — F41.9 ANXIETY AND DEPRESSION: Primary | ICD-10-CM

## 2025-04-10 DIAGNOSIS — F32.A ANXIETY AND DEPRESSION: Primary | ICD-10-CM

## 2025-04-10 DIAGNOSIS — F43.21 GRIEF: ICD-10-CM
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
## 2025-05-07 ENCOUNTER — HOSPITAL ENCOUNTER (OUTPATIENT)
Dept: ULTRASOUND IMAGING | Facility: HOSPITAL | Age: 55
Discharge: HOME | End: 2025-05-07
Payer: COMMERCIAL

## 2025-05-07 ENCOUNTER — LAB (OUTPATIENT)
Dept: LAB | Facility: HOSPITAL | Age: 55
End: 2025-05-07
Payer: COMMERCIAL

## 2025-05-07 DIAGNOSIS — E03.9 HYPOTHYROIDISM (ACQUIRED): ICD-10-CM

## 2025-05-07 DIAGNOSIS — E06.3 HASHIMOTO'S THYROIDITIS: ICD-10-CM

## 2025-05-07 DIAGNOSIS — E05.00 GRAVES DISEASE: ICD-10-CM

## 2025-05-07 DIAGNOSIS — E04.1 THYROID NODULE: ICD-10-CM

## 2025-05-07 LAB
T3FREE SERPL-MCNC: 1.93 PG/ML (ref 2–4.4)
T4 FREE SERPL-MCNC: 1.12 NG/DL (ref 0.92–1.68)
TSH SERPL DL<=0.05 MIU/L-ACNC: 28.9 UIU/ML (ref 0.27–4.2)

## 2025-05-07 PROCEDURE — 84481 FREE ASSAY (FT-3): CPT

## 2025-05-07 PROCEDURE — 84439 ASSAY OF FREE THYROXINE: CPT

## 2025-05-07 PROCEDURE — 86376 MICROSOMAL ANTIBODY EACH: CPT

## 2025-05-07 PROCEDURE — 84443 ASSAY THYROID STIM HORMONE: CPT

## 2025-05-07 PROCEDURE — 84445 ASSAY OF TSI GLOBULIN: CPT

## 2025-05-07 PROCEDURE — 76536 US EXAM OF HEAD AND NECK: CPT

## 2025-05-07 PROCEDURE — 36415 COLL VENOUS BLD VENIPUNCTURE: CPT

## 2025-05-08 LAB — THYROPEROXIDASE AB SERPL-ACNC: >600 IU/ML (ref 0–34)

## 2025-05-09 ENCOUNTER — OFFICE VISIT (OUTPATIENT)
Dept: PSYCHIATRY | Facility: CLINIC | Age: 55
End: 2025-05-09
Payer: COMMERCIAL

## 2025-05-09 DIAGNOSIS — F41.9 ANXIETY AND DEPRESSION: Primary | ICD-10-CM

## 2025-05-09 DIAGNOSIS — F32.A ANXIETY AND DEPRESSION: Primary | ICD-10-CM
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
## 2025-05-11 LAB — TSI SER-ACNC: 14.3 IU/L (ref 0–0.55)

## 2025-05-14 ENCOUNTER — OFFICE VISIT (OUTPATIENT)
Dept: OTOLARYNGOLOGY | Facility: CLINIC | Age: 55
End: 2025-05-14
Payer: COMMERCIAL

## 2025-05-14 VITALS
WEIGHT: 154 LBS | HEIGHT: 65 IN | TEMPERATURE: 98 F | SYSTOLIC BLOOD PRESSURE: 136 MMHG | BODY MASS INDEX: 25.66 KG/M2 | HEART RATE: 68 BPM | DIASTOLIC BLOOD PRESSURE: 80 MMHG

## 2025-05-14 DIAGNOSIS — J34.829 NASAL VALVE COLLAPSE: ICD-10-CM

## 2025-05-14 DIAGNOSIS — E03.9 HYPOTHYROIDISM (ACQUIRED): ICD-10-CM

## 2025-05-14 DIAGNOSIS — R05.3 CHRONIC COUGH: ICD-10-CM

## 2025-05-14 DIAGNOSIS — R04.0 EPISTAXIS: ICD-10-CM

## 2025-05-14 DIAGNOSIS — J34.2 DEVIATED NASAL SEPTUM: ICD-10-CM

## 2025-05-14 DIAGNOSIS — Z01.818 PREOP TESTING: ICD-10-CM

## 2025-05-14 DIAGNOSIS — E05.00 GRAVES DISEASE: ICD-10-CM

## 2025-05-14 DIAGNOSIS — E04.1 THYROID NODULE: ICD-10-CM

## 2025-05-14 DIAGNOSIS — E06.3 HASHIMOTO'S THYROIDITIS: Primary | ICD-10-CM

## 2025-05-14 RX ORDER — METHOCARBAMOL 500 MG/1
500 TABLET, FILM COATED ORAL 3 TIMES DAILY PRN
COMMUNITY
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
## 2025-05-14 NOTE — PATIENT INSTRUCTIONS
1.  Patient has pretty significant Hashimoto's disease with TPO greater than 600 and also significant Graves' disease with TSI that was 14.3.  2.  Patient also complains of not only having foreign body sensation in her throat but having difficulty swallowing and also unable to lay flat on her bed due to the significant thyroiditis.  3.  I think at this point I gave patient options of conservative measure which is to continue to try hormones at different levels and the other option is to just take the whole thyroid out and then start with levothyroxine.  After discussion of all the pros and cons, patient would like to proceed with surgical intervention.  Therefore I recommend proceeding with total thyroidectomy, recurrent laryngeal nerve monitoring.    THYROIDECTOMY: A thyroidectomy was recommended. The risks and benefits were explained including but not limited to bleeding, infection, persistent and/or recurrent disease, risks of the general anesthesia, pain, recurrent laryngeal nerve injury with hoarseness and airway loss, parathyroid injury and hypocalcemia. Operative possibilities including hemithyroidectomy, total thyroidectomy and shara dissections were discussed. Possibilities for delayed need for total thyroidectomy pending pathologic diagnosis were also discussed. Alternatives were discussed. No guarantees were made or implied. Questions were asked appropriately answered.       4.  Patient is to stop aspirin about a week to 10 days before surgery and Plavix about 5 days before surgery  5.  Patient need to obtain cardiac clearance from her cardiologist.  6.  Patient in the meantime is to increase her dose to 150 mcg or 2 tablets daily.

## 2025-06-10 ENCOUNTER — PRE-ADMISSION TESTING (OUTPATIENT)
Dept: PREADMISSION TESTING | Facility: HOSPITAL | Age: 55
End: 2025-06-10
Payer: COMMERCIAL

## 2025-06-10 VITALS
WEIGHT: 153.22 LBS | DIASTOLIC BLOOD PRESSURE: 81 MMHG | SYSTOLIC BLOOD PRESSURE: 148 MMHG | HEIGHT: 65 IN | RESPIRATION RATE: 16 BRPM | BODY MASS INDEX: 25.53 KG/M2 | OXYGEN SATURATION: 96 % | HEART RATE: 80 BPM | TEMPERATURE: 96.8 F

## 2025-06-10 DIAGNOSIS — Z01.818 PREOP TESTING: ICD-10-CM

## 2025-06-10 DIAGNOSIS — E06.3 HASHIMOTO'S THYROIDITIS: ICD-10-CM

## 2025-06-10 DIAGNOSIS — E05.90 THYROTOXICOSIS: Primary | ICD-10-CM

## 2025-06-10 DIAGNOSIS — E05.00 GRAVES DISEASE: ICD-10-CM

## 2025-06-10 DIAGNOSIS — R05.3 CHRONIC COUGH: ICD-10-CM

## 2025-06-10 LAB
ANION GAP SERPL CALCULATED.3IONS-SCNC: 11 MMOL/L (ref 5–15)
APTT PPP: 25.8 SECONDS (ref 24.2–34.2)
BASOPHILS # BLD AUTO: 0.1 10*3/MM3 (ref 0–0.2)
BASOPHILS NFR BLD AUTO: 1 % (ref 0–1.5)
BUN SERPL-MCNC: 17 MG/DL (ref 6–20)
BUN/CREAT SERPL: 20.2 (ref 7–25)
CALCIUM SPEC-SCNC: 9.6 MG/DL (ref 8.6–10.5)
CHLORIDE SERPL-SCNC: 103 MMOL/L (ref 98–107)
CO2 SERPL-SCNC: 25 MMOL/L (ref 22–29)
CREAT SERPL-MCNC: 0.84 MG/DL (ref 0.57–1)
DEPRECATED RDW RBC AUTO: 47.3 FL (ref 37–54)
EGFRCR SERPLBLD CKD-EPI 2021: 81.7 ML/MIN/1.73
EOSINOPHIL # BLD AUTO: 0.26 10*3/MM3 (ref 0–0.4)
EOSINOPHIL NFR BLD AUTO: 2.5 % (ref 0.3–6.2)
ERYTHROCYTE [DISTWIDTH] IN BLOOD BY AUTOMATED COUNT: 14 % (ref 12.3–15.4)
GLUCOSE SERPL-MCNC: 103 MG/DL (ref 65–99)
HCT VFR BLD AUTO: 40.9 % (ref 34–46.6)
HGB BLD-MCNC: 13.2 G/DL (ref 12–15.9)
IMM GRANULOCYTES # BLD AUTO: 0.04 10*3/MM3 (ref 0–0.05)
IMM GRANULOCYTES NFR BLD AUTO: 0.4 % (ref 0–0.5)
INR PPP: 1.05 (ref 0.86–1.15)
LYMPHOCYTES # BLD AUTO: 2.76 10*3/MM3 (ref 0.7–3.1)
LYMPHOCYTES NFR BLD AUTO: 26.5 % (ref 19.6–45.3)
MCH RBC QN AUTO: 29.5 PG (ref 26.6–33)
MCHC RBC AUTO-ENTMCNC: 32.3 G/DL (ref 31.5–35.7)
MCV RBC AUTO: 91.5 FL (ref 79–97)
MONOCYTES # BLD AUTO: 0.79 10*3/MM3 (ref 0.1–0.9)
MONOCYTES NFR BLD AUTO: 7.6 % (ref 5–12)
NEUTROPHILS NFR BLD AUTO: 6.48 10*3/MM3 (ref 1.7–7)
NEUTROPHILS NFR BLD AUTO: 62 % (ref 42.7–76)
NRBC BLD AUTO-RTO: 0 /100 WBC (ref 0–0.2)
PLATELET # BLD AUTO: 161 10*3/MM3 (ref 140–450)
PMV BLD AUTO: 12.9 FL (ref 6–12)
POTASSIUM SERPL-SCNC: 4.3 MMOL/L (ref 3.5–5.2)
PROTHROMBIN TIME: 14.1 SECONDS (ref 11.8–14.9)
QT INTERVAL: 384 MS
QTC INTERVAL: 434 MS
RBC # BLD AUTO: 4.47 10*6/MM3 (ref 3.77–5.28)
SODIUM SERPL-SCNC: 139 MMOL/L (ref 136–145)
WBC NRBC COR # BLD AUTO: 10.43 10*3/MM3 (ref 3.4–10.8)

## 2025-06-10 PROCEDURE — 36415 COLL VENOUS BLD VENIPUNCTURE: CPT

## 2025-06-10 PROCEDURE — 93005 ELECTROCARDIOGRAM TRACING: CPT

## 2025-06-10 PROCEDURE — 85730 THROMBOPLASTIN TIME PARTIAL: CPT

## 2025-06-10 PROCEDURE — 85025 COMPLETE CBC W/AUTO DIFF WBC: CPT

## 2025-06-10 PROCEDURE — 85610 PROTHROMBIN TIME: CPT

## 2025-06-10 PROCEDURE — 80048 BASIC METABOLIC PNL TOTAL CA: CPT

## 2025-06-10 NOTE — DISCHARGE INSTRUCTIONS
IMPORTANT INSTRUCTIONS - PRE-ADMISSION TESTING  HOLD MEDS: ASPIRIN LD 6/10/25  PLAVIX LD 6/10/25      DO NOT EAT OR CHEW anything after midnight the night before your procedure.    You may have CLEAR liquids up to _____2_ hours prior to ARRIVAL time.   Take the following medications the morning of your procedure with JUST A SIP OF WATER:  __amlodipine, welbutrin, levothyroxin, propranolol_____________________________________________________________________________________________________________________________________________________________________________________    DO NOT BRING your medications to the hospital with you, UNLESS something has changed since your PRE-Admission Testing appointment.  Hold all vitamins, supplements, and NSAIDS (Non- steroidal anti-inflammatory meds) for one week prior to surgery (you MAY take Tylenol or Acetaminophen).  If you are diabetic, check your blood sugar the morning of your procedure. If it is less than 70 or if you are feeling symptomatic, call the following number for further instructions: [phone] SAME DAY SURGERY WILL CALL YOUR ARRIVAL TIME BY 4 P.M. 6/16/25 MONDAY______.  Use your inhalers/nebulizers as usual, the morning of your procedure. BRING YOUR INHALERS with you.   Bring your CPAP or BIPAP to hospital, ONLY IF YOU WILL BE SPENDING THE NIGHT.   Make sure you have a ride home and have someone who will stay with you the day of your procedure after you go home.  If you have any questions, please call your Pre-Admission Testing Nurse, _________RICCI_______ at [phone]___________.   Per anesthesia request, do not smoke for 24 hours before your procedure or as instructed by your surgeon.      PREOPERATIVE (BEFORE SURGERY)              BATHING INSTRUCTIONS  Instructions:    You will need to shower 1 time utilizing the soap provided; at the times indicated   below:     - AM THE DAY OF SURGERY OR  PM THE NIGHT BEFORE      Wash your hair and face with normal  shampoo and soap, rinse it well before using the surgical soap.      In the shower, wet the skin completely with water from your neck to your feet. Apply the cleanser to your   body ONLY FROM THE NECK TO YOUR FEET.     Do NOT USE THE CLEANSER ON YOUR FACE, HEAD, OR GENITAL (PRIVATE) AREAS.   Keep it out of your eyes, ears, and mouth because of the risk of injury to those areas.      Scrub with a clean washcloth for each bath utilizing the soap provided from the top of your body to the   bottom starting at the neck area.      Pay close attention to your armpits, groin area, and the site of surgery.      Wash your body gently for 5 minutes. Stand outside the stream or turn off the water while scrubbing your   body. Do NOT wash with your regular soap after the surgical cleanser is used.      RINSE THE CLEANSER OFF COMPLETELY with plenty of water. Rinse the area again thoroughly.      Dry off with a clean towel. The surgical soap can cause dryness; however do NOT APPLY LOTION,   CREAM, POWDER, and/or DEODORANT AFTER SHOWERING.     Be sure to where clean clothes after showering.      Ensure CLEAN BED LINENS AFTER FIRST wash with the surgical soap.      NO PETS ALLOWED IN THE BED with you after utilizing the surgical soap.

## 2025-06-10 NOTE — SIGNIFICANT NOTE
FOLLOWS [facility] PAIN MGMT CHRONIC SPINE PAIN, RX OXY USES [facility] ON [address].

## 2025-06-12 ENCOUNTER — ANESTHESIA EVENT (OUTPATIENT)
Dept: PERIOP | Facility: HOSPITAL | Age: 55
End: 2025-06-12
Payer: COMMERCIAL

## 2025-06-17 ENCOUNTER — ANESTHESIA (OUTPATIENT)
Dept: PERIOP | Facility: HOSPITAL | Age: 55
End: 2025-06-17
Payer: COMMERCIAL

## 2025-06-17 ENCOUNTER — HOSPITAL ENCOUNTER (OUTPATIENT)
Facility: HOSPITAL | Age: 55
Discharge: HOME | End: 2025-06-18
Attending: OTOLARYNGOLOGY | Admitting: OTOLARYNGOLOGY
Payer: COMMERCIAL

## 2025-06-17 DIAGNOSIS — R05.3 CHRONIC COUGH: ICD-10-CM

## 2025-06-17 DIAGNOSIS — E05.00 GRAVES DISEASE: ICD-10-CM

## 2025-06-17 DIAGNOSIS — G89.18 POSTOPERATIVE PAIN: Primary | ICD-10-CM

## 2025-06-17 DIAGNOSIS — E06.3 HASHIMOTO'S THYROIDITIS: ICD-10-CM

## 2025-06-17 PROBLEM — R05.9 COUGH: Status: RESOLVED | Noted: 2023-04-20 | Resolved: 2025-06-17

## 2025-06-17 LAB
ANION GAP SERPL CALCULATED.3IONS-SCNC: 8.3 MMOL/L (ref 5–15)
BUN SERPL-MCNC: 14.3 MG/DL (ref 6–20)
BUN/CREAT SERPL: 17.9 (ref 7–25)
CALCIUM SPEC-SCNC: 9 MG/DL (ref 8.6–10.5)
CALCIUM SPEC-SCNC: 9.1 MG/DL (ref 8.6–10.5)
CHLORIDE SERPL-SCNC: 106 MMOL/L (ref 98–107)
CO2 SERPL-SCNC: 22.7 MMOL/L (ref 22–29)
CREAT SERPL-MCNC: 0.8 MG/DL (ref 0.57–1)
EGFRCR SERPLBLD CKD-EPI 2021: 86.6 ML/MIN/1.73
GLUCOSE SERPL-MCNC: 104 MG/DL (ref 65–99)
POTASSIUM SERPL-SCNC: 4.4 MMOL/L (ref 3.5–5.2)
PTH-INTACT SERPL-MCNC: 58.6 PG/ML (ref 17.9–58.6)
SODIUM SERPL-SCNC: 137 MMOL/L (ref 136–145)

## 2025-06-17 PROCEDURE — 25010000002 PROPOFOL 10 MG/ML EMULSION

## 2025-06-17 PROCEDURE — 25010000002 PHENYLEPHRINE 10 MG/ML SOLUTION 1 ML VIAL

## 2025-06-17 PROCEDURE — 25010000002 MAGNESIUM SULFATE IN D5W 1G/100ML (PREMIX) 1-5 GM/100ML-% SOLUTION

## 2025-06-17 PROCEDURE — 25010000002 LIDOCAINE 1% - EPINEPHRINE 1:100000 1 %-1:100000 SOLUTION: Performed by: OTOLARYNGOLOGY

## 2025-06-17 PROCEDURE — 94799 UNLISTED PULMONARY SVC/PX: CPT

## 2025-06-17 PROCEDURE — 83970 ASSAY OF PARATHORMONE: CPT | Performed by: OTOLARYNGOLOGY

## 2025-06-17 PROCEDURE — 25010000002 FENTANYL CITRATE (PF) 50 MCG/ML SOLUTION

## 2025-06-17 PROCEDURE — 25010000002 MIDAZOLAM PER 1MG: Performed by: ANESTHESIOLOGY

## 2025-06-17 PROCEDURE — 25010000002 LIDOCAINE PF 2% 2 % SOLUTION

## 2025-06-17 PROCEDURE — 25010000002 ONDANSETRON PER 1 MG

## 2025-06-17 PROCEDURE — 25810000003 LACTATED RINGERS PER 1000 ML: Performed by: ANESTHESIOLOGY

## 2025-06-17 PROCEDURE — 80048 BASIC METABOLIC PNL TOTAL CA: CPT | Performed by: OTOLARYNGOLOGY

## 2025-06-17 PROCEDURE — 25010000002 DEXAMETHASONE PER 1 MG

## 2025-06-17 PROCEDURE — 88307 TISSUE EXAM BY PATHOLOGIST: CPT | Performed by: OTOLARYNGOLOGY

## 2025-06-17 PROCEDURE — 25010000002 SUGAMMADEX 200 MG/2ML SOLUTION

## 2025-06-17 PROCEDURE — 25010000002 GLYCOPYRROLATE 0.2 MG/ML SOLUTION

## 2025-06-17 PROCEDURE — 60240 REMOVAL OF THYROID: CPT | Performed by: OTOLARYNGOLOGY

## 2025-06-17 DEVICE — LIGACLIP MCA MULTIPLE CLIP APPLIERS, 20 SMALL CLIPS
Type: IMPLANTABLE DEVICE | Site: THYROID | Status: FUNCTIONAL
Brand: LIGACLIP

## 2025-06-17 RX ORDER — METHOCARBAMOL 500 MG/1
500 TABLET, FILM COATED ORAL 3 TIMES DAILY PRN
Status: DISCONTINUED | OUTPATIENT
Start: 2025-06-17 | End: 2025-06-18 | Stop reason: HOSPADM

## 2025-06-17 RX ORDER — LIDOCAINE HYDROCHLORIDE AND EPINEPHRINE 10; 10 MG/ML; UG/ML
INJECTION, SOLUTION INFILTRATION; PERINEURAL AS NEEDED
Status: DISCONTINUED | OUTPATIENT
Start: 2025-06-17 | End: 2025-06-17 | Stop reason: HOSPADM

## 2025-06-17 RX ORDER — PETROLATUM,WHITE
OINTMENT IN PACKET (GRAM) TOPICAL AS NEEDED
Status: DISCONTINUED | OUTPATIENT
Start: 2025-06-17 | End: 2025-06-17 | Stop reason: SURG

## 2025-06-17 RX ORDER — BISACODYL 10 MG
10 SUPPOSITORY, RECTAL RECTAL DAILY PRN
Status: DISCONTINUED | OUTPATIENT
Start: 2025-06-17 | End: 2025-06-18 | Stop reason: HOSPADM

## 2025-06-17 RX ORDER — EPHEDRINE SULFATE 50 MG/ML
INJECTION INTRAVENOUS AS NEEDED
Status: DISCONTINUED | OUTPATIENT
Start: 2025-06-17 | End: 2025-06-17 | Stop reason: SURG

## 2025-06-17 RX ORDER — ATORVASTATIN CALCIUM 40 MG/1
80 TABLET, FILM COATED ORAL NIGHTLY
Status: DISCONTINUED | OUTPATIENT
Start: 2025-06-17 | End: 2025-06-18 | Stop reason: HOSPADM

## 2025-06-17 RX ORDER — CETIRIZINE HYDROCHLORIDE 10 MG/1
10 TABLET ORAL DAILY
Status: DISCONTINUED | OUTPATIENT
Start: 2025-06-17 | End: 2025-06-18 | Stop reason: HOSPADM

## 2025-06-17 RX ORDER — BUPROPION HYDROCHLORIDE 150 MG/1
150 TABLET ORAL EVERY MORNING
Status: DISCONTINUED | OUTPATIENT
Start: 2025-06-18 | End: 2025-06-18 | Stop reason: HOSPADM

## 2025-06-17 RX ORDER — ACETAMINOPHEN 500 MG
1000 TABLET ORAL ONCE
Status: COMPLETED | OUTPATIENT
Start: 2025-06-17 | End: 2025-06-17

## 2025-06-17 RX ORDER — SODIUM CHLORIDE 0.9 % (FLUSH) 0.9 %
10 SYRINGE (ML) INJECTION AS NEEDED
Status: DISCONTINUED | OUTPATIENT
Start: 2025-06-17 | End: 2025-06-18 | Stop reason: HOSPADM

## 2025-06-17 RX ORDER — MIRTAZAPINE 15 MG/1
15 TABLET, FILM COATED ORAL NIGHTLY
Status: DISCONTINUED | OUTPATIENT
Start: 2025-06-17 | End: 2025-06-18 | Stop reason: HOSPADM

## 2025-06-17 RX ORDER — NICOTINE 21 MG/24HR
1 PATCH, TRANSDERMAL 24 HOURS TRANSDERMAL
Status: DISCONTINUED | OUTPATIENT
Start: 2025-06-17 | End: 2025-06-18 | Stop reason: HOSPADM

## 2025-06-17 RX ORDER — BISACODYL 5 MG/1
5 TABLET, DELAYED RELEASE ORAL DAILY PRN
Status: DISCONTINUED | OUTPATIENT
Start: 2025-06-17 | End: 2025-06-18 | Stop reason: HOSPADM

## 2025-06-17 RX ORDER — CALCIUM CARBONATE/VITAMIN D3 600 MG-10
2 TABLET ORAL
Status: DISCONTINUED | OUTPATIENT
Start: 2025-06-17 | End: 2025-06-18 | Stop reason: HOSPADM

## 2025-06-17 RX ORDER — PROPRANOLOL HYDROCHLORIDE 80 MG/1
80 CAPSULE, EXTENDED RELEASE ORAL DAILY
Status: DISCONTINUED | OUTPATIENT
Start: 2025-06-18 | End: 2025-06-18 | Stop reason: HOSPADM

## 2025-06-17 RX ORDER — ONDANSETRON 2 MG/ML
INJECTION INTRAMUSCULAR; INTRAVENOUS AS NEEDED
Status: DISCONTINUED | OUTPATIENT
Start: 2025-06-17 | End: 2025-06-17 | Stop reason: SURG

## 2025-06-17 RX ORDER — DEXMEDETOMIDINE HYDROCHLORIDE 100 UG/ML
INJECTION, SOLUTION INTRAVENOUS AS NEEDED
Status: DISCONTINUED | OUTPATIENT
Start: 2025-06-17 | End: 2025-06-17 | Stop reason: SURG

## 2025-06-17 RX ORDER — MORPHINE SULFATE 2 MG/ML
2 INJECTION, SOLUTION INTRAMUSCULAR; INTRAVENOUS
Status: DISCONTINUED | OUTPATIENT
Start: 2025-06-17 | End: 2025-06-18 | Stop reason: HOSPADM

## 2025-06-17 RX ORDER — AMOXICILLIN 250 MG
2 CAPSULE ORAL 2 TIMES DAILY
Status: DISCONTINUED | OUTPATIENT
Start: 2025-06-17 | End: 2025-06-18 | Stop reason: HOSPADM

## 2025-06-17 RX ORDER — HYDROCODONE BITARTRATE AND ACETAMINOPHEN 7.5; 325 MG/1; MG/1
1 TABLET ORAL EVERY 4 HOURS PRN
Status: DISCONTINUED | OUTPATIENT
Start: 2025-06-17 | End: 2025-06-18 | Stop reason: HOSPADM

## 2025-06-17 RX ORDER — PROMETHAZINE HYDROCHLORIDE 25 MG/1
25 TABLET ORAL ONCE AS NEEDED
Status: DISCONTINUED | OUTPATIENT
Start: 2025-06-17 | End: 2025-06-17 | Stop reason: HOSPADM

## 2025-06-17 RX ORDER — FLUTICASONE PROPIONATE 50 MCG
2 SPRAY, SUSPENSION (ML) NASAL DAILY
Status: DISCONTINUED | OUTPATIENT
Start: 2025-06-17 | End: 2025-06-18 | Stop reason: HOSPADM

## 2025-06-17 RX ORDER — ONDANSETRON 2 MG/ML
4 INJECTION INTRAMUSCULAR; INTRAVENOUS ONCE AS NEEDED
Status: DISCONTINUED | OUTPATIENT
Start: 2025-06-17 | End: 2025-06-17 | Stop reason: HOSPADM

## 2025-06-17 RX ORDER — GINSENG 100 MG
CAPSULE ORAL AS NEEDED
Status: DISCONTINUED | OUTPATIENT
Start: 2025-06-17 | End: 2025-06-17 | Stop reason: HOSPADM

## 2025-06-17 RX ORDER — GLYCOPYRROLATE 0.2 MG/ML
INJECTION INTRAMUSCULAR; INTRAVENOUS AS NEEDED
Status: DISCONTINUED | OUTPATIENT
Start: 2025-06-17 | End: 2025-06-17 | Stop reason: SURG

## 2025-06-17 RX ORDER — SODIUM CHLORIDE 450 MG/100ML
75 INJECTION, SOLUTION INTRAVENOUS CONTINUOUS
Status: DISCONTINUED | OUTPATIENT
Start: 2025-06-17 | End: 2025-06-18 | Stop reason: HOSPADM

## 2025-06-17 RX ORDER — LIDOCAINE HYDROCHLORIDE 20 MG/ML
INJECTION, SOLUTION EPIDURAL; INFILTRATION; INTRACAUDAL; PERINEURAL AS NEEDED
Status: DISCONTINUED | OUTPATIENT
Start: 2025-06-17 | End: 2025-06-17 | Stop reason: SURG

## 2025-06-17 RX ORDER — KETAMINE HYDROCHLORIDE 10 MG/ML
INJECTION, SOLUTION INTRAMUSCULAR; INTRAVENOUS AS NEEDED
Status: DISCONTINUED | OUTPATIENT
Start: 2025-06-17 | End: 2025-06-17 | Stop reason: SURG

## 2025-06-17 RX ORDER — PROPOFOL 10 MG/ML
VIAL (ML) INTRAVENOUS AS NEEDED
Status: DISCONTINUED | OUTPATIENT
Start: 2025-06-17 | End: 2025-06-17 | Stop reason: SURG

## 2025-06-17 RX ORDER — SODIUM CHLORIDE, SODIUM LACTATE, POTASSIUM CHLORIDE, CALCIUM CHLORIDE 600; 310; 30; 20 MG/100ML; MG/100ML; MG/100ML; MG/100ML
9 INJECTION, SOLUTION INTRAVENOUS CONTINUOUS PRN
Status: DISCONTINUED | OUTPATIENT
Start: 2025-06-17 | End: 2025-06-17

## 2025-06-17 RX ORDER — ONDANSETRON 2 MG/ML
4 INJECTION INTRAMUSCULAR; INTRAVENOUS EVERY 6 HOURS PRN
Status: DISCONTINUED | OUTPATIENT
Start: 2025-06-17 | End: 2025-06-18 | Stop reason: HOSPADM

## 2025-06-17 RX ORDER — OXYCODONE HYDROCHLORIDE 5 MG/1
5 TABLET ORAL
Status: COMPLETED | OUTPATIENT
Start: 2025-06-17 | End: 2025-06-17

## 2025-06-17 RX ORDER — FENTANYL CITRATE 50 UG/ML
INJECTION, SOLUTION INTRAMUSCULAR; INTRAVENOUS AS NEEDED
Status: DISCONTINUED | OUTPATIENT
Start: 2025-06-17 | End: 2025-06-17 | Stop reason: SURG

## 2025-06-17 RX ORDER — POLYETHYLENE GLYCOL 3350 17 G/17G
17 POWDER, FOR SOLUTION ORAL DAILY PRN
Status: DISCONTINUED | OUTPATIENT
Start: 2025-06-17 | End: 2025-06-18 | Stop reason: HOSPADM

## 2025-06-17 RX ORDER — AMLODIPINE BESYLATE 10 MG/1
10 TABLET ORAL DAILY
Status: DISCONTINUED | OUTPATIENT
Start: 2025-06-18 | End: 2025-06-18 | Stop reason: HOSPADM

## 2025-06-17 RX ORDER — SODIUM CHLORIDE 0.9 % (FLUSH) 0.9 %
10 SYRINGE (ML) INJECTION EVERY 12 HOURS SCHEDULED
Status: DISCONTINUED | OUTPATIENT
Start: 2025-06-17 | End: 2025-06-18 | Stop reason: HOSPADM

## 2025-06-17 RX ORDER — ROCURONIUM BROMIDE 10 MG/ML
INJECTION, SOLUTION INTRAVENOUS AS NEEDED
Status: DISCONTINUED | OUTPATIENT
Start: 2025-06-17 | End: 2025-06-17 | Stop reason: SURG

## 2025-06-17 RX ORDER — PROMETHAZINE HYDROCHLORIDE 25 MG/1
25 SUPPOSITORY RECTAL ONCE AS NEEDED
Status: DISCONTINUED | OUTPATIENT
Start: 2025-06-17 | End: 2025-06-17 | Stop reason: HOSPADM

## 2025-06-17 RX ORDER — MAGNESIUM SULFATE 1 G/100ML
INJECTION INTRAVENOUS AS NEEDED
Status: DISCONTINUED | OUTPATIENT
Start: 2025-06-17 | End: 2025-06-17 | Stop reason: SURG

## 2025-06-17 RX ORDER — ALBUTEROL SULFATE 0.83 MG/ML
SOLUTION RESPIRATORY (INHALATION) AS NEEDED
Status: DISCONTINUED | OUTPATIENT
Start: 2025-06-17 | End: 2025-06-17 | Stop reason: SURG

## 2025-06-17 RX ORDER — DEXAMETHASONE SODIUM PHOSPHATE 4 MG/ML
INJECTION, SOLUTION INTRA-ARTICULAR; INTRALESIONAL; INTRAMUSCULAR; INTRAVENOUS; SOFT TISSUE AS NEEDED
Status: DISCONTINUED | OUTPATIENT
Start: 2025-06-17 | End: 2025-06-17 | Stop reason: SURG

## 2025-06-17 RX ORDER — SODIUM CHLORIDE 9 MG/ML
40 INJECTION, SOLUTION INTRAVENOUS AS NEEDED
Status: DISCONTINUED | OUTPATIENT
Start: 2025-06-17 | End: 2025-06-18 | Stop reason: HOSPADM

## 2025-06-17 RX ORDER — MAGNESIUM HYDROXIDE 1200 MG/15ML
LIQUID ORAL AS NEEDED
Status: DISCONTINUED | OUTPATIENT
Start: 2025-06-17 | End: 2025-06-17 | Stop reason: HOSPADM

## 2025-06-17 RX ORDER — LEVOTHYROXINE SODIUM 75 UG/1
75 TABLET ORAL
Status: DISCONTINUED | OUTPATIENT
Start: 2025-06-18 | End: 2025-06-18 | Stop reason: HOSPADM

## 2025-06-17 RX ORDER — ALBUTEROL SULFATE 90 UG/1
INHALANT RESPIRATORY (INHALATION) AS NEEDED
Status: DISCONTINUED | OUTPATIENT
Start: 2025-06-17 | End: 2025-06-17 | Stop reason: SURG

## 2025-06-17 RX ORDER — MIDAZOLAM HYDROCHLORIDE 2 MG/2ML
2 INJECTION, SOLUTION INTRAMUSCULAR; INTRAVENOUS ONCE
Status: COMPLETED | OUTPATIENT
Start: 2025-06-17 | End: 2025-06-17

## 2025-06-17 RX ADMIN — SUGAMMADEX 100 MG: 100 INJECTION, SOLUTION INTRAVENOUS at 15:00

## 2025-06-17 RX ADMIN — NICOTINE 1 PATCH: 21 PATCH, EXTENDED RELEASE TRANSDERMAL at 22:20

## 2025-06-17 RX ADMIN — ATORVASTATIN CALCIUM 80 MG: 40 TABLET, FILM COATED ORAL at 20:30

## 2025-06-17 RX ADMIN — DOCUSATE SODIUM 50MG AND SENNOSIDES 8.6MG 2 TABLET: 8.6; 5 TABLET, FILM COATED ORAL at 20:30

## 2025-06-17 RX ADMIN — KETAMINE HYDROCHLORIDE 20 MG: 10 INJECTION INTRAMUSCULAR; INTRAVENOUS at 14:30

## 2025-06-17 RX ADMIN — WHITE PETROLATUM 57.7 %-MINERAL OIL 31.9 % EYE OINTMENT 1 G: at 13:32

## 2025-06-17 RX ADMIN — DEXMEDETOMIDINE 20 MCG: 100 INJECTION, SOLUTION, CONCENTRATE INTRAVENOUS at 13:25

## 2025-06-17 RX ADMIN — ROCURONIUM BROMIDE 20 MG: 10 INJECTION, SOLUTION INTRAVENOUS at 13:10

## 2025-06-17 RX ADMIN — PROPOFOL 150 MG: 10 INJECTION, EMULSION INTRAVENOUS at 13:10

## 2025-06-17 RX ADMIN — ALBUTEROL SULFATE 2.5 MG: 2.5 SOLUTION RESPIRATORY (INHALATION) at 14:33

## 2025-06-17 RX ADMIN — MAGNESIUM SULFATE 1 G: 1 INJECTION INTRAVENOUS at 14:14

## 2025-06-17 RX ADMIN — MIDAZOLAM HYDROCHLORIDE 2 MG: 1 INJECTION, SOLUTION INTRAMUSCULAR; INTRAVENOUS at 13:05

## 2025-06-17 RX ADMIN — Medication 1 G: at 13:31

## 2025-06-17 RX ADMIN — OXYCODONE HYDROCHLORIDE 5 MG: 5 TABLET ORAL at 15:44

## 2025-06-17 RX ADMIN — FENTANYL CITRATE 25 MCG: 50 INJECTION, SOLUTION INTRAMUSCULAR; INTRAVENOUS at 13:50

## 2025-06-17 RX ADMIN — FENTANYL CITRATE 25 MCG: 50 INJECTION, SOLUTION INTRAMUSCULAR; INTRAVENOUS at 13:10

## 2025-06-17 RX ADMIN — OXYCODONE HYDROCHLORIDE 5 MG: 5 TABLET ORAL at 15:29

## 2025-06-17 RX ADMIN — DEXAMETHASONE SODIUM PHOSPHATE 8 MG: 4 INJECTION, SOLUTION INTRAMUSCULAR; INTRAVENOUS at 13:31

## 2025-06-17 RX ADMIN — CETIRIZINE HYDROCHLORIDE 10 MG: 10 TABLET, FILM COATED ORAL at 17:45

## 2025-06-17 RX ADMIN — ALBUTEROL SULFATE 1 PUFF: 90 AEROSOL, METERED RESPIRATORY (INHALATION) at 14:46

## 2025-06-17 RX ADMIN — HYDROCODONE BITARTRATE AND ACETAMINOPHEN 1 TABLET: 7.5; 325 TABLET ORAL at 22:20

## 2025-06-17 RX ADMIN — DEXMEDETOMIDINE 10 MCG: 100 INJECTION, SOLUTION, CONCENTRATE INTRAVENOUS at 14:57

## 2025-06-17 RX ADMIN — GLYCOPYRROLATE 0.2 MG: 0.2 INJECTION INTRAMUSCULAR; INTRAVENOUS at 14:47

## 2025-06-17 RX ADMIN — ACETAMINOPHEN 1000 MG: 500 TABLET ORAL at 12:07

## 2025-06-17 RX ADMIN — SODIUM CHLORIDE 75 ML/HR: 450 INJECTION, SOLUTION INTRAVENOUS at 16:24

## 2025-06-17 RX ADMIN — SODIUM CHLORIDE, POTASSIUM CHLORIDE, SODIUM LACTATE AND CALCIUM CHLORIDE 9 ML/HR: 600; 310; 30; 20 INJECTION, SOLUTION INTRAVENOUS at 12:07

## 2025-06-17 RX ADMIN — Medication 2 TABLET: at 17:45

## 2025-06-17 RX ADMIN — DEXMEDETOMIDINE 10 MCG: 100 INJECTION, SOLUTION, CONCENTRATE INTRAVENOUS at 14:19

## 2025-06-17 RX ADMIN — EPHEDRINE SULFATE 10 MG: 50 INJECTION INTRAVENOUS at 13:48

## 2025-06-17 RX ADMIN — Medication 10 ML: at 20:30

## 2025-06-17 RX ADMIN — MIRTAZAPINE 15 MG: 15 TABLET, FILM COATED ORAL at 20:30

## 2025-06-17 RX ADMIN — FLUTICASONE PROPIONATE 2 SPRAY: 50 SPRAY, METERED NASAL at 17:45

## 2025-06-17 RX ADMIN — ONDANSETRON 4 MG: 2 INJECTION INTRAMUSCULAR; INTRAVENOUS at 14:42

## 2025-06-17 RX ADMIN — KETAMINE HYDROCHLORIDE 20 MG: 10 INJECTION INTRAMUSCULAR; INTRAVENOUS at 13:25

## 2025-06-17 RX ADMIN — PHENYLEPHRINE HYDROCHLORIDE 0.5 MCG/KG/MIN: 10 INJECTION INTRAVENOUS at 13:27

## 2025-06-17 RX ADMIN — HYDROCODONE BITARTRATE AND ACETAMINOPHEN 1 TABLET: 7.5; 325 TABLET ORAL at 17:45

## 2025-06-17 RX ADMIN — LIDOCAINE HYDROCHLORIDE 80 MG: 20 INJECTION, SOLUTION INTRAVENOUS at 13:10

## 2025-06-17 RX ADMIN — KETAMINE HYDROCHLORIDE 20 MG: 10 INJECTION INTRAMUSCULAR; INTRAVENOUS at 13:57

## 2025-06-17 RX ADMIN — FENTANYL CITRATE 50 MCG: 50 INJECTION, SOLUTION INTRAMUSCULAR; INTRAVENOUS at 14:52

## 2025-06-17 NOTE — OP NOTE
THYROIDECTOMY  Procedure Report    Patient Name:  Zoya Crook  YOB: 1970    Date of Surgery:  6/17/2025     Indications:  Zoya Crook is a 54 year old female with chronic cough noted to have lymphocytic thyroiditis due to both Graves Disease and Hashimoto's Thyroiditis.  Therefore, patient was recommended total thyroidectomy, recurrent laryngeal nerve monitoring.  All the risks, benefits, and alternatives were discussed with the patient.  Patient understands and wants to proceed with the planned procedures.    Pre-op Diagnosis:   Hashimoto's thyroiditis [E06.3]  Graves disease [E05.00]  Chronic cough [R05.3]    Post-Op Diagnosis Codes:     * Hashimoto's thyroiditis [E06.3]     * Graves disease [E05.00]     * Chronic cough [R05.3]       Procedure/CPT® Codes:  1.  TOTAL THYROIDECTOMY  2.  RECURRENT LARYNGEAL NERVE MONITORING (start: 13:43 pm. end: 14: 58 pm)     Surgeon(s):  Bart Aponte MD      Staff:  Circulator: Terri Hartman RN; Joaquina Bay RN  Scrub Person: Tracie Wills Leslie  Assistant: Katie Willingham RN CSA  Orientee: Belén Yap RN  Other: Damaso Fernandes RN     Assistant: Katie Willingham RN CSA was responsible for performing the following activities: Retraction, Suction, Irrigation and Suturing and their skilled assistance was necessary for the success of this case.      Anesthesia: General    Estimated Blood Loss: 10 mL    Implants:    Implant Name Type Inv. Item Serial No.  Lot No. LRB No. Used Action   CLIPAPPLR M/ ENDO LIGACLIP 9 3/8IN SM - [id number] Implant CLIPAPPLR M/ ENDO LIGACLIP 9 3/8IN SM  [employer]  DIV [employer] J [employer] J [id number] N/A 1 Implanted   CLIPAPPLR M/ ENDO LIGACLIP 9 3/8IN SM - [id number] Implant CLIPAPPLR M/ ENDO LIGACLIP 9 3/8IN SM  [employer]  DIV OF J AND J [id number] N/A 1 Implanted       Specimen:          Specimens       ID Source Type Tests Collected By Collected At Frozen?    A Thyroid Tissue TISSUE  PATHOLOGY EXAM   Bart Aponte MD 6/17/25 1410     Description: LEFT THYROID WITH ISTHMUS    Comment: 4x3    B Thyroid Tissue TISSUE PATHOLOGY EXAM   Bart Aponte MD 6/17/25 1433     Description: RIGHT THYROID    Comment: 5.5x3            Findings:   1.  Left thyroid with isthmus total measurement 4.0 cm x 3.0 cm with very fibrotic and firm thyroid.  Frozen section not sent.  2.  Right thyroid measuring 5.5 cm x 3.0 cm with very firm and fibrotic thyroid with no discrete nodules  3.  Bilateral recurrent laryngeal nerves identified, confirmed with nerve probe and preserved  4.  Bilateral superior parathyroid glands identified and preserved.  5.  Bilateral inferior parathyroid glands identified and preserved also.  7.  15 Croatian Hemovac drain placed with grenade suction  8.  Postop voice normal     Complications: None      Description of Procedure:   Patient was taken to the operating room and general endotracheal anesthesia was performed in supine position.  Special endotracheal tube for nerve monitoring was utilized for continuous neuro monitoring.  After patient had been adequately anesthetized and endotracheal tube secured patient was placed in hyperextension with shoulder roll in place.  Neck was then prepped with Betadine and draped in the usual sterile manner.  Incision line was drawn preoperatively and it was marked again and subsequently infiltrated with 1% Xylocaine with 1-100,000 epinephrine.  Grounding electrodes were placed and hooked up to nerve monitor for continuous nerve monitoring.       Incision was then carried out with a 15 blade followed by electrocautery for hemostasis.  Subplatysmal flap was elevated superiorly and inferiorly.  Midline incision was made through the median raphae and strap muscles were retracted laterally.  Isthmus was identified and dissected in the left lateral isthmus was divided using LigaSure.  And then subsequently the rest of the remaining thyroid on the left was  further dissected superiorly and inferiorly in a blunt fashion.  Inferior pole vessels were ligated and divided using titanium ligaclips where appropriate.  Superior pole vessels were also ligated and divided using titanium ligaclips as well.  Bulk of the left thyroid was dissected in the posterior lateral aspect as well as the inferior aspect.  Subsequently with the thyroid pulled through the incision, lateral to medial dissection was performed identifying the recurrent laryngeal nerve and confirmed with nerve probe and preserved.  Also superior parathyroid glands were identified and preserved with its vascular supply.  Inferior parathyroid glands were also identified and preserved with its vascular supply.  Left thyroid with isthmus which was quite fibrotic and firm was measuring 4.0 cm x 3.0 cm.  After removal of the left thyroid, the entire right thyroid was removed by both poles were released using titanium ligaclips.  Right thyroid was then pulled through the incision again releasing and identifying the recurrent laryngeal nerve which was confirmed with nerve probe and preserved as well.  Right superior parathyroid gland was also identified and preserved along with inferior parathyroid gland.  Right thyroid measured 5.5 cm x 3.0 cm.  15 Hebrew drain was placed looped around both sides of the neck.  And then midline incision was closed with 4-0 Vicryl in running fashion followed by subcutaneous layer closure using 4-0 Vicryl and 5-0 Vicryl interrupted manner.  And then skin was closed with 6-0 Prolene.  Drain was sutured in place with 2-0 nylon.  Drain was then hooked up to Hemovac suction.  Patient was subsequently extubated and transported to recovery room in good condition.  Postop voice normal.     Bart Aponte MD     Date: 6/17/2025  Time: 15:09 EDT

## 2025-06-17 NOTE — PLAN OF CARE
Goal Outcome Evaluation:   VSS since been on the floor since surgery. Pain medicine given x 1 for throat pain. Continuing IV fluids.

## 2025-06-17 NOTE — ANESTHESIA PREPROCEDURE EVALUATION
" Anesthesia Evaluation     Patient summary reviewed and Nursing notes reviewed   no history of anesthetic complications:   NPO Solid Status: > 8 hours  NPO Liquid Status: > 2 hours           Airway   Mallampati: II  TM distance: >3 FB  Neck ROM: full  No difficulty expected  Dental    (+) upper dentures        Pulmonary - normal exam    breath sounds clear to auscultation  (+) a smoker Current,  Cardiovascular - normal exam  Exercise tolerance: good (4-7 METS)    ECG reviewed  Rhythm: regular  Rate: normal    (+) hypertension, past MI , CAD, cardiac stents (2018) , hyperlipidemia    ROS comment: Off plavix    Neuro/Psych  (+) CVA (\"years ago\"), numbness (radiculopathy), psychiatric history Anxiety and Depression  GI/Hepatic/Renal/Endo    (+) thyroid problem     Musculoskeletal     (+) back pain, neck pain  Abdominal    Substance History      OB/GYN negative ob/gyn ROS         Other   arthritis,     ROS/Med Hx Other: >4METS.HX CAD,STENT X1,HTN,HLD,CVA    ECHO 11/26/22 EF 58%,NO VALVE STENOSIS.  CARDS OV 6/18/24 STABLE, F/U 1 YR.   CARDS CLEARANCE 6/16/25. KT           Interpretation Summary echo 2022       •  Calculated left ventricular EF = 58.2%  •  Left ventricular wall thickness is consistent with mild septal asymmetric hypertrophy.  •  Left ventricular diastolic function is consistent with (grade I) impaired relaxation.  •  No hemodynamically significant valvular pathology.            Anesthesia Plan    ASA 3     general     (Patient understands anesthesia not responsible for dental damage.)  intravenous induction     Anesthetic plan, risks, benefits, and alternatives have been provided, discussed and informed consent has been obtained with: patient.    Plan discussed with CRNA.      CODE STATUS:         "

## 2025-06-17 NOTE — ANESTHESIA POSTPROCEDURE EVALUATION
Patient: Zoya Crook    Procedure Summary       Date: 06/17/25 Room / Location: [facility] OR 09 / [facility] MAIN OR    Anesthesia Start: 1308 Anesthesia Stop: 1507    Procedure: total thyroidectomy, recurrent laryngeal nerve monitoring. (Bilateral: Neck) Diagnosis:       Hashimoto's thyroiditis      Graves disease      Chronic cough      (Hashimoto's thyroiditis [E06.3])      (Graves disease [E05.00])      (Chronic cough [R05.3])    Surgeons: Bart Aponte MD Provider: Keyon Bragg MD    Anesthesia Type: general ASA Status: 3            Anesthesia Type: general    Vitals  No vitals data found for the desired time range.          Post Anesthesia Care and Evaluation    Post-procedure mental status: acceptable.  Pain management: satisfactory to patient    Airway patency: patent  Anesthetic complications: No anesthetic complications    Cardiovascular status: acceptable  Respiratory status: acceptable    Comments: Per chart review

## 2025-06-17 NOTE — H&P
PRIMARY CARE PROVIDER: George Rosas MD  REFERRING PROVIDER: George Rosas MD    CHIEF COMPLAINT:  Preoperative evaluation for surgery    Subjective   History of Present Illness:  Zoya Crook is a  54 y.o.  female who is here for the following problems:    Cough    Hashimoto's thyroiditis    Graves disease      She is scheduled for total thyroidectomy, recurrent laryngeal nerve monitoring. (Bilateral), total thyroidectomy, recurrent laryngeal nerve monitoring. (Bilateral). There has been no significant change in the history since the preoperative office evaluation.     Review of Systems:  CONSTITUTIONAL: no fever or chills  PULMONARY: no cough or shortness of breath  GI: no nausea or vomiting    Past History:  Medical History: has a past medical history of Acromioclavicular separation (2010), Anxiety (04/27/2016), Arteriosclerosis of coronary artery (02/27/2018), Bunion (Remove it long time ago), Cervical disc disorder, Chronic pain (04/20/2023), Dental disease (3 years ago), Depression, GERD (gastroesophageal reflux disease), Head injury, Headache disorder (11/05/2015), Heart attack, Hyperlipidemia, Hypertension, Kidney stone, Low back pain, Lumbar disc disease with radiculopathy (03/20/2023), Nosebleed (5/7/24), Panic disorder (N/a), Primary osteoarthritis of right hip (12/19/2022), Stroke, Substance abuse, Thyrotoxicosis (04/20/2023), and Tobacco dependence syndrome (04/20/2023).   Surgical History: has a past surgical history that includes Coronary stent placement; Foot surgery (2012); Esophagogastroduodenoscopy (N/A, 04/10/2023); Cardiac surgery (2018); Upper gastrointestinal endoscopy; Colonoscopy (N/A, 04/01/2024); Colonoscopy (N/A, 07/02/2024); and Esophagogastroduodenoscopy (04/10/2023).   Family History: family history includes Alcohol abuse in her father.   Social History: reports that she has been smoking cigarettes. She has a 30 pack-year smoking history. She has been exposed to tobacco smoke. She  has never used smokeless tobacco. She reports current alcohol use of about 1.0 standard drink of alcohol per week. She reports that she does not use drugs.  Home Medications:  amLODIPine, aspirin, atorvastatin, buPROPion XL, clopidogrel, fluticasone, levothyroxine, linaclotide, loratadine, methocarbamol, mirtazapine, oxyCODONE, and propranolol LA     Allergies: Acetaminophen, Ciprofloxacin, Naproxen, and Sulfamethoxazole       Objective     Vital Signs:  Temp:  [98.7 °F (37.1 °C)] 98.7 °F (37.1 °C)  Heart Rate:  [69] 69  Resp:  [17] 17  BP: (134)/(71) 134/71    Physical Exam:  CONSTITUTIONAL: well nourished, well-developed, alert, oriented, in no acute distress   COMMUNICATION AND VOICE: able to communicate normally, normal voice quality  HEAD: normocephalic, no lesions, atraumatic, no tenderness, no masses   FACE: appearance normal, no lesions, no tenderness, no deformities, facial motion symmetric  EYES: ocular motility normal, eyelids normal, orbits normal, no proptosis, conjunctiva normal , pupils equal, round   EARS:  Hearing: hearing to conversational voice intact bilaterally   External Ears: normal bilaterally, no lesions  NOSE:  External Nose: external nasal structure normal, no tenderness on palpation, no nasal discharge, no lesions, no evidence of trauma, nostrils patent   ORAL:  Lips: upper and lower lips without lesion   NECK:  Inspection and Palpation: neck appearance normal, no masses or tenderness  CHEST/RESPIRATORY: normal respiratory effort   CARDIOVASCULAR: no cyanosis or edema   NEUROLOGICAL/PSYCHIATRIC: oriented to time, place and person, mood normal, affect appropriate, CN II-XII intact grossly    ASSESSMENT:    Cough    Hashimoto's thyroiditis    Graves disease    PLAN:  total thyroidectomy, recurrent laryngeal nerve monitoring. (Bilateral), total thyroidectomy, recurrent laryngeal nerve monitoring. (Bilateral)    1.  Patient has pretty significant Hashimoto's disease with TPO greater than 600  and also significant Graves' disease with TSI that was 14.3.  2.  Patient also complains of not only having foreign body sensation in her throat but having difficulty swallowing and also unable to lay flat on her bed due to the significant thyroiditis.  3.  I think at this point I gave patient options of conservative measure which is to continue to try hormones at different levels and the other option is to just take the whole thyroid out and then start with levothyroxine.  After discussion of all the pros and cons, patient would like to proceed with surgical intervention.  Therefore I recommend proceeding with total thyroidectomy, recurrent laryngeal nerve monitoring.     THYROIDECTOMY: A thyroidectomy was recommended. The risks and benefits were explained including but not limited to bleeding, infection, persistent and/or recurrent disease, risks of the general anesthesia, pain, recurrent laryngeal nerve injury with hoarseness and airway loss, parathyroid injury and hypocalcemia. Operative possibilities including hemithyroidectomy, total thyroidectomy and shara dissections were discussed. Possibilities for delayed need for total thyroidectomy pending pathologic diagnosis were also discussed. Alternatives were discussed. No guarantees were made or implied. Questions were asked appropriately answered.        4.  Patient is to stop aspirin about a week to 10 days before surgery and Plavix about 5 days before surgery  5.  Patient need to obtain cardiac clearance from her cardiologist.  6.  Patient in the meantime is to increase her dose to 150 mcg or 2 tablets daily.    Bart Aponte MD  06/17/25  10:18 EDT

## 2025-06-18 ENCOUNTER — TELEPHONE (OUTPATIENT)
Dept: OTOLARYNGOLOGY | Facility: CLINIC | Age: 55
End: 2025-06-18
Payer: COMMERCIAL

## 2025-06-18 VITALS
DIASTOLIC BLOOD PRESSURE: 81 MMHG | BODY MASS INDEX: 25.86 KG/M2 | TEMPERATURE: 97.8 F | HEIGHT: 65 IN | SYSTOLIC BLOOD PRESSURE: 144 MMHG | OXYGEN SATURATION: 96 % | WEIGHT: 155.2 LBS | RESPIRATION RATE: 16 BRPM | HEART RATE: 69 BPM

## 2025-06-18 PROBLEM — E06.3 LYMPHOCYTIC THYROIDITIS: Status: RESOLVED | Noted: 2025-06-17 | Resolved: 2025-06-18

## 2025-06-18 LAB
CALCIUM SPEC-SCNC: 9.2 MG/DL (ref 8.6–10.5)
HOLD SPECIMEN: NORMAL
PTH-INTACT SERPL-MCNC: <6 PG/ML (ref 17.9–58.6)
WHOLE BLOOD HOLD SPECIMEN: NORMAL

## 2025-06-18 PROCEDURE — 82310 ASSAY OF CALCIUM: CPT | Performed by: OTOLARYNGOLOGY

## 2025-06-18 PROCEDURE — 83970 ASSAY OF PARATHORMONE: CPT | Performed by: OTOLARYNGOLOGY

## 2025-06-18 PROCEDURE — 94799 UNLISTED PULMONARY SVC/PX: CPT

## 2025-06-18 PROCEDURE — 94761 N-INVAS EAR/PLS OXIMETRY MLT: CPT

## 2025-06-18 PROCEDURE — 99024 POSTOP FOLLOW-UP VISIT: CPT | Performed by: OTOLARYNGOLOGY

## 2025-06-18 RX ORDER — ASPIRIN 81 MG/1
81 TABLET, CHEWABLE ORAL DAILY
Start: 2025-06-26

## 2025-06-18 RX ORDER — OXYCODONE AND ACETAMINOPHEN 5; 325 MG/1; MG/1
1 TABLET ORAL EVERY 4 HOURS PRN
Qty: 25 TABLET | Refills: 0 | Status: SHIPPED | OUTPATIENT
Start: 2025-06-18 | End: 2025-06-21

## 2025-06-18 RX ORDER — CLOPIDOGREL BISULFATE 75 MG/1
75 TABLET ORAL DAILY
Start: 2025-06-23

## 2025-06-18 RX ORDER — AZITHROMYCIN 250 MG/1
TABLET, FILM COATED ORAL
Qty: 6 TABLET | Refills: 0 | Status: SHIPPED | OUTPATIENT
Start: 2025-06-18

## 2025-06-18 RX ADMIN — HYDROCODONE BITARTRATE AND ACETAMINOPHEN 1 TABLET: 7.5; 325 TABLET ORAL at 08:00

## 2025-06-18 RX ADMIN — Medication 2 TABLET: at 13:01

## 2025-06-18 RX ADMIN — FLUTICASONE PROPIONATE 2 SPRAY: 50 SPRAY, METERED NASAL at 08:01

## 2025-06-18 RX ADMIN — Medication 2 TABLET: at 07:59

## 2025-06-18 RX ADMIN — LEVOTHYROXINE SODIUM 75 MCG: 0.07 TABLET ORAL at 08:00

## 2025-06-18 RX ADMIN — Medication 2 TABLET: at 17:39

## 2025-06-18 RX ADMIN — CETIRIZINE HYDROCHLORIDE 10 MG: 10 TABLET, FILM COATED ORAL at 08:00

## 2025-06-18 RX ADMIN — SODIUM CHLORIDE 75 ML/HR: 450 INJECTION, SOLUTION INTRAVENOUS at 05:33

## 2025-06-18 RX ADMIN — PROPRANOLOL HYDROCHLORIDE 80 MG: 80 CAPSULE, EXTENDED RELEASE ORAL at 09:23

## 2025-06-18 RX ADMIN — BUPROPION HYDROCHLORIDE 150 MG: 150 TABLET, EXTENDED RELEASE ORAL at 07:59

## 2025-06-18 RX ADMIN — DOCUSATE SODIUM 50MG AND SENNOSIDES 8.6MG 2 TABLET: 8.6; 5 TABLET, FILM COATED ORAL at 08:01

## 2025-06-18 RX ADMIN — AMLODIPINE BESYLATE 10 MG: 10 TABLET ORAL at 08:00

## 2025-06-18 RX ADMIN — HYDROCODONE BITARTRATE AND ACETAMINOPHEN 1 TABLET: 7.5; 325 TABLET ORAL at 02:59

## 2025-06-18 NOTE — DISCHARGE SUMMARY
Date of Discharge:  6/18/2025    Discharge Diagnosis:   Same as below    Problem List:  Active Hospital Problems   No active problems to display.      Resolved Hospital Problems    Diagnosis Date Resolved POA    **Lymphocytic thyroiditis [E06.3] 06/18/2025 Yes    Hashimoto's thyroiditis [E06.3] 06/17/2025 Yes    Graves disease [E05.00] 06/17/2025 Yes    Cough [R05.9] 06/17/2025 Yes       Presenting Problem/History of Present Illness  Hashimoto's thyroiditis [E06.3]  Graves disease [E05.00]  Chronic cough [R05.3]  Lymphocytic thyroiditis [E06.3]      Hospital Course  Patient is a 54 y.o. female presented with above problems and underwent below procedure uneventfully.  Patient's postop course was unremarkable and drain output is still bloody and therefore she will go home with drain in place with instructions for management.  Otherwise her voice is good and [place] is negative.  Incisions are unremarkable..      Procedures Performed    Procedure(s):  total thyroidectomy, recurrent laryngeal nerve monitoring.  -------------------       Consults:   Consults       No orders found for last 30 day(s).            Pertinent Test Results: Please see lab results    Condition on Discharge: Stable    Vital Signs  Temp:  [97.3 °F (36.3 °C)-97.8 °F (36.6 °C)] 97.8 °F (36.6 °C)  Heart Rate:  [55-72] 69  Resp:  [16-18] 16  BP: (110-144)/(62-81) 144/81    Discharge Disposition  Home or Self Care    Discharge Medications     Discharge Medications        New Medications        Instructions Start Date   azithromycin 250 MG tablet  Commonly known as: ZITHROMAX   Take 2 tablets the first day, then 1 tablet daily for 4 days.      Calcium Carb-Cholecalciferol 600-12.5 MG-MCG capsule   2 tablets, Oral, 3 times daily      oxyCODONE-acetaminophen 5-325 MG per tablet  Commonly known as: PERCOCET   1 tablet, Oral, Every 4 Hours PRN             Changes to Medications        Instructions Start Date   aspirin 81 MG chewable tablet  What changed:  These instructions start on June 26, 2025. If you are unsure what to do until then, ask your doctor or other care provider.   81 mg, Oral, Daily   Start Date: June 26, 2025     clopidogrel 75 MG tablet  Commonly known as: PLAVIX  What changed: These instructions start on June 23, 2025. If you are unsure what to do until then, ask your doctor or other care provider.   75 mg, Oral, Daily   Start Date: June 23, 2025            Continue These Medications        Instructions Start Date   amLODIPine 10 MG tablet  Commonly known as: NORVASC   Take 1 tablet every day by oral route for 90 days.      atorvastatin 80 MG tablet  Commonly known as: LIPITOR   80 mg, Oral, Nightly      buPROPion  MG 24 hr tablet  Commonly known as: Wellbutrin XL   150 mg, Oral, Every Morning      fluticasone 50 MCG/ACT nasal spray  Commonly known as: FLONASE   2 sprays, Nasal, Daily      loratadine 10 MG tablet  Commonly known as: CLARITIN   1 tablet, Oral, Daily PRN      methocarbamol 500 MG tablet  Commonly known as: ROBAXIN   500 mg, 3 Times Daily PRN      mirtazapine 15 MG tablet  Commonly known as: Remeron   15 mg, Oral, Nightly      oxyCODONE 5 MG immediate release tablet  Commonly known as: ROXICODONE   1 tablet, Every 12 Hours PRN      propranolol LA 80 MG 24 hr capsule  Commonly known as: INDERAL LA   Take 1 capsule by mouth Daily.             Stop These Medications      levothyroxine 75 MCG tablet  Commonly known as: SYNTHROID, LEVOTHROID              Discharge Diet: Regular      Activity at Discharge: Keep incision dry and wait few more days even after drain is removed.  Patient is to call office when drainage is less than 5 mL over 24 hours for drain removal.      Follow-up Appointments  Future Appointments   Date Time Provider Department Center   6/24/2025  2:00 PM Trudi Almeida LCSW [facility]   6/25/2025  9:45 AM Bart Aponte MD [facility]   7/7/2025  3:00 PM Alberta Jones MD [facility]   7/23/2025  10:45 AM Bart Aponte MD [facility]         Test Results Pending at Discharge  Pending Labs       Order Current Status    Tissue Pathology Exam In process            Bart Aponte MD    Time: Less than 30 minutes

## 2025-06-18 NOTE — PLAN OF CARE
Goal Outcome Evaluation:  Plan of Care Reviewed With: patient        Progress: improving  Outcome Evaluation: Patient pleasant and compliant with care. VSS. Patient's pain treated per MAR. Patient's INÉS drain to bulb suction, bloody output noted. Patient alert and able to marko needs known. Call light in reach and bed in lowest locked position. Patient has had no additional complaints so far.

## 2025-06-20 ENCOUNTER — OFFICE VISIT (OUTPATIENT)
Dept: OTOLARYNGOLOGY | Facility: CLINIC | Age: 55
End: 2025-06-20
Payer: COMMERCIAL

## 2025-06-20 VITALS
BODY MASS INDEX: 25.33 KG/M2 | DIASTOLIC BLOOD PRESSURE: 84 MMHG | WEIGHT: 152 LBS | HEIGHT: 65 IN | HEART RATE: 84 BPM | SYSTOLIC BLOOD PRESSURE: 138 MMHG | TEMPERATURE: 98 F

## 2025-06-20 DIAGNOSIS — Z98.890 STATUS POST THYROIDECTOMY: Primary | ICD-10-CM

## 2025-06-20 DIAGNOSIS — E06.3 HASHIMOTO'S THYROIDITIS: ICD-10-CM

## 2025-06-20 DIAGNOSIS — E05.00 GRAVES DISEASE: ICD-10-CM

## 2025-06-20 DIAGNOSIS — E03.9 HYPOTHYROIDISM (ACQUIRED): ICD-10-CM

## 2025-06-20 DIAGNOSIS — Z90.89 STATUS POST THYROIDECTOMY: Primary | ICD-10-CM
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
## 2025-06-20 NOTE — PATIENT INSTRUCTIONS
1.  Left INÉS drain removed in office today without complication.  2.  Anterior neck incision well-approximated without signs of infection.  3.  Patient is 1 week postop follow-up with Dr. Aponte.  4.  We did discuss postoperative pathology.  Discussed that Dr. Aponte will likely start her back on her hormone replacement at the 1 week follow-up.
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
## 2025-06-25 ENCOUNTER — OFFICE VISIT (OUTPATIENT)
Dept: OTOLARYNGOLOGY | Facility: CLINIC | Age: 55
End: 2025-06-25
Payer: COMMERCIAL

## 2025-06-25 VITALS
HEIGHT: 65 IN | SYSTOLIC BLOOD PRESSURE: 132 MMHG | WEIGHT: 152 LBS | HEART RATE: 82 BPM | BODY MASS INDEX: 25.33 KG/M2 | DIASTOLIC BLOOD PRESSURE: 75 MMHG | TEMPERATURE: 98.4 F

## 2025-06-25 DIAGNOSIS — Z98.890 STATUS POST THYROIDECTOMY: ICD-10-CM

## 2025-06-25 DIAGNOSIS — E05.00 GRAVES DISEASE: ICD-10-CM

## 2025-06-25 DIAGNOSIS — E06.3 HASHIMOTO'S THYROIDITIS: ICD-10-CM

## 2025-06-25 DIAGNOSIS — Z90.89 STATUS POST THYROIDECTOMY: ICD-10-CM

## 2025-06-25 DIAGNOSIS — E03.9 HYPOTHYROIDISM (ACQUIRED): Primary | ICD-10-CM

## 2025-06-25 DIAGNOSIS — E83.51 HYPOCALCEMIA: ICD-10-CM

## 2025-06-25 LAB
QT INTERVAL: 384 MS
QTC INTERVAL: 434 MS

## 2025-06-25 PROCEDURE — 3078F DIAST BP <80 MM HG: CPT | Performed by: OTOLARYNGOLOGY

## 2025-06-25 PROCEDURE — 3075F SYST BP GE 130 - 139MM HG: CPT | Performed by: OTOLARYNGOLOGY

## 2025-06-25 PROCEDURE — 99213 OFFICE O/P EST LOW 20 MIN: CPT | Performed by: OTOLARYNGOLOGY

## 2025-06-25 PROCEDURE — 1160F RVW MEDS BY RX/DR IN RCRD: CPT | Performed by: OTOLARYNGOLOGY

## 2025-06-25 PROCEDURE — 1159F MED LIST DOCD IN RCRD: CPT | Performed by: OTOLARYNGOLOGY

## 2025-06-25 RX ORDER — LEVOTHYROXINE SODIUM 150 UG/1
150 TABLET ORAL DAILY
Qty: 60 TABLET | Refills: 3 | Status: SHIPPED | OUTPATIENT
Start: 2025-06-25

## 2025-06-25 RX ORDER — LOSARTAN POTASSIUM AND HYDROCHLOROTHIAZIDE 12.5; 5 MG/1; MG/1
1 TABLET ORAL DAILY
COMMUNITY
Start: 2025-06-24 | End: 2025-09-22

## 2025-06-25 RX ORDER — LIOTHYRONINE SODIUM 5 UG/1
5 TABLET ORAL DAILY
Qty: 30 TABLET | Refills: 2 | Status: SHIPPED | OUTPATIENT
Start: 2025-06-25

## 2025-06-25 NOTE — PATIENT INSTRUCTIONS
1.  Patient is status post total thyroidectomy and doing well.  She is here for suture removal which was removed uneventfully and neck incision is unremarkable.  2.  Patient was started on levothyroxine 150 mcg p.o. daily.  And also added Cytomel 5 mcg p.o. daily.  3.  Patient will follow-up next month on 7/23/2025 with thyroid labs.  In the meantime she will also continue the calcium until next visit after reviewing the lab results.
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
## 2025-07-01 DIAGNOSIS — E03.9 HYPOTHYROIDISM (ACQUIRED): ICD-10-CM

## 2025-07-01 RX ORDER — LIOTHYRONINE SODIUM 5 UG/1
5 TABLET ORAL DAILY
Qty: 90 TABLET | Refills: 1 | Status: SHIPPED | OUTPATIENT
Start: 2025-07-01

## 2025-07-07 ENCOUNTER — OFFICE VISIT (OUTPATIENT)
Dept: PSYCHIATRY | Facility: CLINIC | Age: 55
End: 2025-07-07
Payer: COMMERCIAL

## 2025-07-07 VITALS
SYSTOLIC BLOOD PRESSURE: 136 MMHG | WEIGHT: 152.8 LBS | BODY MASS INDEX: 25.46 KG/M2 | HEART RATE: 78 BPM | DIASTOLIC BLOOD PRESSURE: 80 MMHG | HEIGHT: 65 IN

## 2025-07-07 DIAGNOSIS — F41.1 GENERALIZED ANXIETY DISORDER: ICD-10-CM

## 2025-07-07 DIAGNOSIS — F43.21 GRIEF: Primary | ICD-10-CM

## 2025-07-07 DIAGNOSIS — F33.42 RECURRENT MAJOR DEPRESSIVE DISORDER, IN FULL REMISSION: ICD-10-CM

## 2025-07-07 DIAGNOSIS — F51.05 INSOMNIA DUE TO MENTAL CONDITION: ICD-10-CM

## 2025-07-07 PROCEDURE — 99214 OFFICE O/P EST MOD 30 MIN: CPT | Performed by: STUDENT IN AN ORGANIZED HEALTH CARE EDUCATION/TRAINING PROGRAM

## 2025-07-07 PROCEDURE — 1160F RVW MEDS BY RX/DR IN RCRD: CPT | Performed by: STUDENT IN AN ORGANIZED HEALTH CARE EDUCATION/TRAINING PROGRAM

## 2025-07-07 PROCEDURE — 1159F MED LIST DOCD IN RCRD: CPT | Performed by: STUDENT IN AN ORGANIZED HEALTH CARE EDUCATION/TRAINING PROGRAM

## 2025-07-07 PROCEDURE — 3079F DIAST BP 80-89 MM HG: CPT | Performed by: STUDENT IN AN ORGANIZED HEALTH CARE EDUCATION/TRAINING PROGRAM

## 2025-07-07 PROCEDURE — 3075F SYST BP GE 130 - 139MM HG: CPT | Performed by: STUDENT IN AN ORGANIZED HEALTH CARE EDUCATION/TRAINING PROGRAM

## 2025-07-07 PROCEDURE — 90833 PSYTX W PT W E/M 30 MIN: CPT | Performed by: STUDENT IN AN ORGANIZED HEALTH CARE EDUCATION/TRAINING PROGRAM

## 2025-07-07 NOTE — PROGRESS NOTES
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 insomnia, lowering of seizure threshold.  After discussion of these risks and benefits, the patient voiced understanding and agreed to proceed.  CONTINUE mirtazapine 7.5 mg qhs. Risks, benefits, alternatives discussed with patient including GI upset, sedation, dizziness with falls risk, increased appetite.  Do not use before operating vehicle, vessel, or machine. After discussion of these risks and benefits, the patient voiced understanding and agreed to proceed.  S/P:  duloxetine 30 mg qam. Sedation 6/24  Labs: none    Patient screened positive for depression based on a PHQ-9 score of 10 on 1/7/2025. Follow-up recommendations include: Prescribed antidepressant medication treatment and Suicide Risk Assessment performed.           TREATMENT PLAN/GOALS: Continue supportive psychotherapy efforts and medications as indicated. Treatment and medication options discussed during today's visit. Patient acknowledged and verbally consented to continue with current treatment plan and was educated on the importance of compliance with treatment and follow-up appointments.    MEDICATION ISSUES:  OSIEL reviewed as expected.  Discussed medication options and treatment plan of prescribed medication as well as the risks,  benefits, and side effects including potential falls, possible impaired driving and metabolic adversities among others. Patient is agreeable to call the office with any worsening of symptoms or onset of side effects. Patient is agreeable to call 911 or go to the nearest ER should he/she begin having SI/HI. No medication side effects or related complaints today.     MEDS ORDERED DURING VISIT:  No orders of the defined types were placed in this encounter.      Return in about 3 months (around 10/7/2025).         This document has been electronically signed by Alberta Jones MD  July 7, 2025 15:23 EDT    Dictated Utilizing Dragon Dictation: Part of this note may be an electronic transcription/translation of spoken language to printed text using the Dragon Dictation System.

## 2025-07-07 NOTE — TREATMENT PLAN
Anxiety:  2/10 progressing    Goals:  Patient will develop and implement behavioral and cognitive strategies to reduce anxiety and irrational fears, monthly, using Rogerian psychotherapy and CBT where appropriate.  Help patient explore past emotional issues in relation to present anxiety, monthly, until remission of symptoms, using Rogerian psychotherapy and CBT where appropriate.  Help patient develop an awareness of their cognitive and physical responses to anxiety, monthly, until remission of symptoms, using Rogerian psychotherapy and CBT where appropriate.          Depression:  2/10 progressing    Goals:  Patient will demonstrate the ability to initiate new constructive life skills outside of sessions on a consistent basis, monthly, using Rogerian psychotherapy and CBT where appropriate.  Assist patient in setting attainable activities of daily living goals, monthly, using Rogerian psychotherapy and CBT where appropriate.  Provide education about depression, monthly, using Rogerian psychotherapy and CBT where appropriate.  Assist patient in developing healthy coping strategies, monthly, using Rogerian psychotherapy and CBT where appropriate.    Rogerian psychotherapy and CBT will be used to help accomplish the above goals and manage depression and anxiety related to grief, family conflict, relationships       I have discussed and reviewed this treatment plan with the patient.      Reviewed by Alberta Jones MD   07/07/2025

## 2025-07-14 NOTE — PROGRESS NOTES
[facility] - Progress Note    Date: 07/17/2025  Time In: 1:00  Time Out: 1:54    Patient Legal Name: Zoya Crook  Patient Age: 54 y.o.    CHIEF COMPLAINT: mood     Subjective   History of Present Illness       Zoya returns today for ongoing treatment  Goals from our previous session were to continue Supportive therapy to relieve symptom burden and promote adaptive skills     Assessment    Mental Status Exam     Appearance: good hygiene and dressed appropriately for the weather  Behavior: calm  Cooperation:  engaged, cooperative, attentive, and friendly  Eye Contact:  good  Affect:  congruent  Mood: expressive  Speech: responsive  Thought Process:  organized  Thought Content: appropriate  Suicidal: denies  Homicidal:  denies  Hallucinations:  denies  Memory:  intact; no overt deficits in session  Orientation:  person, place, time, and situation  Reliability:  reliable  Insight:  good  Judgment:  good     Clinical Intervention       ICD-10-CM ICD-9-CM   1. Anxiety and depression  F41.9 300.00    F32.A 311   2. Grief  F43.21 309.0        Zoya (54 y.o.) presents today as a follow-up for continued psychotherapy. Individual psychotherapy was provided utilizing solution focused  techniques to provide symptom relief, support self-esteem, promote emotion regulation, challenge avoidance, manage stress, identify triggers, recognize patterns of behavior, acknowledge sources of feelings and behaviors, identify strengths, assess symptoms, provide support, and discuss interpersonal conflicts.    Zoya presents today with  some [id number] regarding her grief and recent events with her daughters murder that have concerned her.  She says that she tries to distract herself, not feel the emotions but at time it can be overwhelming.  She has excellent support from her family and many times  mk by staying busy and directed toward the care of her grandchildren.     Plan   Plan & Goals     Supportive therapy to relieve symptom  burden and promote adaptive skills     Patient acknowledged and verbally consented to continue working toward resolving current treatment plan goals and was educated on the importance of participation in the therapeutic process.  Patient will remain compliant with medication regimen as prescribed. Discuss any medication side effects, questions or concerns with prescribing provider.  Call 911 or present to the nearest emergency room in an emergency situation.  National Suicide Prevention Lifeline: Call 988. The Lifeline provides 24/7, free and confidential support for people in distress, prevention and crisis resources.  Crisis Text Line  Text HOME To 152987    Return in about 6 weeks (around 8/28/2025).    ____________________  This document has been electronically signed by Trudi Almeida LCSW  July 17, 2025 17:14 EDT    Part of this note may be an electronic transcription/translation of spoken language to printed text using the Dragon Dictation System.

## 2025-07-17 ENCOUNTER — OFFICE VISIT (OUTPATIENT)
Dept: PSYCHIATRY | Facility: CLINIC | Age: 55
End: 2025-07-17
Payer: COMMERCIAL

## 2025-07-17 DIAGNOSIS — F32.A ANXIETY AND DEPRESSION: Primary | ICD-10-CM

## 2025-07-17 DIAGNOSIS — F41.9 ANXIETY AND DEPRESSION: Primary | ICD-10-CM

## 2025-07-17 DIAGNOSIS — F43.21 GRIEF: ICD-10-CM

## 2025-07-17 NOTE — PSYCHOTHERAPY NOTE
Throid  out   Court  2 weeks ago,    ask why not done   by sept              Sister  Girl is in the hospital, claiming mental illness, she has a long history, no evidence but changing her story     Family     Changing story  , going to hospital

## 2025-07-18 ENCOUNTER — LAB (OUTPATIENT)
Facility: HOSPITAL | Age: 55
End: 2025-07-18
Payer: COMMERCIAL

## 2025-07-18 DIAGNOSIS — Z90.89 STATUS POST THYROIDECTOMY: ICD-10-CM

## 2025-07-18 DIAGNOSIS — E03.9 HYPOTHYROIDISM (ACQUIRED): ICD-10-CM

## 2025-07-18 DIAGNOSIS — E83.51 HYPOCALCEMIA: ICD-10-CM

## 2025-07-18 DIAGNOSIS — Z98.890 STATUS POST THYROIDECTOMY: ICD-10-CM

## 2025-07-18 DIAGNOSIS — E05.00 GRAVES DISEASE: ICD-10-CM

## 2025-07-18 DIAGNOSIS — E06.3 HASHIMOTO'S THYROIDITIS: ICD-10-CM

## 2025-07-18 LAB
ANION GAP SERPL CALCULATED.3IONS-SCNC: 10.2 MMOL/L (ref 5–15)
BUN SERPL-MCNC: 22 MG/DL (ref 6–20)
BUN/CREAT SERPL: 28.2 (ref 7–25)
CALCIUM SPEC-SCNC: 10 MG/DL (ref 8.6–10.5)
CALCIUM SPEC-SCNC: 9.7 MG/DL (ref 8.6–10.5)
CHLORIDE SERPL-SCNC: 104 MMOL/L (ref 98–107)
CO2 SERPL-SCNC: 25.8 MMOL/L (ref 22–29)
CREAT SERPL-MCNC: 0.78 MG/DL (ref 0.57–1)
EGFRCR SERPLBLD CKD-EPI 2021: 89.3 ML/MIN/1.73
GLUCOSE SERPL-MCNC: 91 MG/DL (ref 65–99)
POTASSIUM SERPL-SCNC: 4.3 MMOL/L (ref 3.5–5.2)
PTH-INTACT SERPL-MCNC: 24 PG/ML (ref 15–65)
PTH-INTACT SERPL-MCNC: 26.5 PG/ML (ref 17.9–58.6)
SODIUM SERPL-SCNC: 140 MMOL/L (ref 136–145)
T3FREE SERPL-MCNC: 3.78 PG/ML (ref 2–4.4)
T4 FREE SERPL-MCNC: 2.26 NG/DL (ref 0.92–1.68)
TSH SERPL DL<=0.05 MIU/L-ACNC: 0.71 UIU/ML (ref 0.27–4.2)

## 2025-07-18 PROCEDURE — 83970 ASSAY OF PARATHORMONE: CPT

## 2025-07-18 PROCEDURE — 36415 COLL VENOUS BLD VENIPUNCTURE: CPT

## 2025-07-18 PROCEDURE — 84481 FREE ASSAY (FT-3): CPT

## 2025-07-18 PROCEDURE — 84439 ASSAY OF FREE THYROXINE: CPT

## 2025-07-18 PROCEDURE — 84443 ASSAY THYROID STIM HORMONE: CPT

## 2025-07-18 PROCEDURE — 84445 ASSAY OF TSI GLOBULIN: CPT

## 2025-07-18 PROCEDURE — 86376 MICROSOMAL ANTIBODY EACH: CPT

## 2025-07-19 LAB — THYROPEROXIDASE AB SERPL-ACNC: 469 IU/ML (ref 0–34)

## 2025-07-22 LAB — TSI SER-ACNC: 3.31 IU/L (ref 0–0.55)

## 2025-07-23 ENCOUNTER — OFFICE VISIT (OUTPATIENT)
Dept: OTOLARYNGOLOGY | Facility: CLINIC | Age: 55
End: 2025-07-23
Payer: COMMERCIAL

## 2025-07-23 VITALS
HEART RATE: 76 BPM | TEMPERATURE: 98.4 F | SYSTOLIC BLOOD PRESSURE: 153 MMHG | DIASTOLIC BLOOD PRESSURE: 78 MMHG | BODY MASS INDEX: 25.33 KG/M2 | HEIGHT: 65 IN | WEIGHT: 152 LBS

## 2025-07-23 DIAGNOSIS — E06.3 HASHIMOTO'S THYROIDITIS: ICD-10-CM

## 2025-07-23 DIAGNOSIS — E05.00 GRAVES DISEASE: ICD-10-CM

## 2025-07-23 DIAGNOSIS — Z90.89 STATUS POST THYROIDECTOMY: ICD-10-CM

## 2025-07-23 DIAGNOSIS — Z98.890 STATUS POST THYROIDECTOMY: ICD-10-CM

## 2025-07-23 DIAGNOSIS — E03.9 HYPOTHYROIDISM (ACQUIRED): Primary | ICD-10-CM

## 2025-07-23 RX ORDER — LIOTHYRONINE SODIUM 5 UG/1
5 TABLET ORAL DAILY
Qty: 90 TABLET | Refills: 1 | Status: SHIPPED | OUTPATIENT
Start: 2025-07-23

## 2025-07-23 RX ORDER — LEVOTHYROXINE SODIUM 112 UG/1
112 TABLET ORAL DAILY
Qty: 60 TABLET | Refills: 3 | Status: SHIPPED | OUTPATIENT
Start: 2025-07-23
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
## 2025-07-23 NOTE — PATIENT INSTRUCTIONS
1.  Patient is status post total thyroidectomy for significant lymphocytic thyroiditis.  Otherwise she has been started on levothyroxine 150 mcg p.o. daily with follow-up labs showing that she is slightly overcorrected.  Therefore I would recommend decreasing her dose to 112 mcg p.o. daily  2.  Will also stop calcium supplementation as well.  3.  I will see patient in a month with another thyroid labs after the change in dosing.  4.  Also I have been asked the patient to start massaging the neck scar about 30 times daily until it softens.

## 2025-08-21 ENCOUNTER — LAB (OUTPATIENT)
Facility: HOSPITAL | Age: 55
End: 2025-08-21
Payer: COMMERCIAL

## 2025-08-25 ENCOUNTER — TRANSCRIBE ORDERS (OUTPATIENT)
Dept: ADMINISTRATIVE | Facility: HOSPITAL | Age: 55
End: 2025-08-25
Payer: COMMERCIAL

## 2025-08-25 DIAGNOSIS — M25.562 LEFT KNEE PAIN, UNSPECIFIED CHRONICITY: Primary | ICD-10-CM

## (undated) DEVICE — SINGLE-USE BIOPSY FORCEPS: Brand: RADIAL JAW 4

## (undated) DEVICE — PENCL SMOKE/EVAC MEGADYNE TELESCP 10FT

## (undated) DEVICE — BLCK/BITE BLOX WO/DENTL/RIM W/STRAP 54F

## (undated) DEVICE — RETR STAY ELAS SLD/BLD 6.5X16MM PK/4

## (undated) DEVICE — RETR RNG GEN2 18.6X8.9CM

## (undated) DEVICE — PROBE 8225825X 3PK INCREMT STD PRASS TIP: Brand: NIM

## (undated) DEVICE — SOLIDIFIER LIQLOC PLS 1500CC BT

## (undated) DEVICE — GLV SURG BIOGEL LTX PF 7 1/2

## (undated) DEVICE — SLV SCD KN/LEN ADJ EXPRSS BLENDED MD 1P/U

## (undated) DEVICE — EMG TUBE 8229707 NIM TRIVANTAGE 7.0MM ID: Brand: NIM TRIVANTAGE™

## (undated) DEVICE — Device

## (undated) DEVICE — SOL IRRG H2O PL/BG 1000ML STRL

## (undated) DEVICE — SNAR E/S POLYP SNAREMASTER OVL/10MM 2.8X2300MM YEL

## (undated) DEVICE — SPONGE,DISSECTOR,ROUND CHERRY,XR,ST,5/PK: Brand: MEDLINE

## (undated) DEVICE — DRAPE,INSTRUMENT,MAGNETIC,10X16: Brand: MEDLINE

## (undated) DEVICE — Device: Brand: DEFENDO AIR/WATER/SUCTION AND BIOPSY VALVE

## (undated) DEVICE — CONN JET HYDRA H20 AUXILIARY DISP

## (undated) DEVICE — ENT-LF: Brand: MEDLINE INDUSTRIES, INC.

## (undated) DEVICE — THE SINGLE USE ETRAP – POLYP TRAP IS USED FOR SUCTION RETRIEVAL OF ENDOSCOPICALLY REMOVED POLYPS.: Brand: ETRAP

## (undated) DEVICE — LINER SURG CANSTR SXN S/RIGD 1500CC

## (undated) DEVICE — SUT PROLN 6/0 P1 18IN 8697G

## (undated) DEVICE — DEV OPN LIGASURE DISSCT EXACT 40DEG 21.6MM

## (undated) DEVICE — SUT ETHLN 2/0 FS 18IN 664H

## (undated) DEVICE — SUT VIC COAT 5/0 P3 18IN

## (undated) DEVICE — DRSNG SURESITE WNDW 4X4.5

## (undated) DEVICE — SUT VIC 4/0 P3 18IN J494G

## (undated) DEVICE — VAGINAL PREP TRAY: Brand: MEDLINE INDUSTRIES, INC.

## (undated) DEVICE — DRN WND JP RND W TROC SIL 15F 3/16IN

## (undated) DEVICE — THE STERILE LIGHT HANDLE COVER IS USED WITH STERIS SURGICAL LIGHTING AND VISUALIZATION SYSTEMS.

## (undated) DEVICE — RESERVOIR,SUCTION,100CC,SILICONE: Brand: MEDLINE